# Patient Record
Sex: FEMALE | Race: WHITE | NOT HISPANIC OR LATINO | Employment: OTHER | URBAN - METROPOLITAN AREA
[De-identification: names, ages, dates, MRNs, and addresses within clinical notes are randomized per-mention and may not be internally consistent; named-entity substitution may affect disease eponyms.]

---

## 2019-04-04 ENCOUNTER — HOSPITAL ENCOUNTER (OUTPATIENT)
Dept: OTHER | Facility: HOSPITAL | Age: 59
Discharge: HOME | End: 2019-04-04

## 2019-04-04 LAB
ALBUMIN SERPL-MCNC: 4 G/DL (ref 3.5–5)
ALBUMIN/GLOB SERPL: 1.1 {RATIO} (ref 1.4–2.6)
ALP SERPL-CCNC: 112 U/L (ref 53–141)
ALT SERPL-CCNC: 17 U/L (ref 10–40)
ANION GAP SERPL CALC-SCNC: 21 MMOL/L (ref 8–19)
APPEARANCE UR: CLEAR
APTT BLD: 25.2 S (ref 22.2–34.2)
AST SERPL-CCNC: 23 U/L (ref 15–50)
BASOPHILS # BLD AUTO: 0.05 10*3/UL (ref 0–0.2)
BASOPHILS NFR BLD AUTO: 0.7 % (ref 0–3)
BILIRUB SERPL-MCNC: 0.47 MG/DL (ref 0.2–1.3)
BILIRUB UR QL: NEGATIVE
BUN SERPL-MCNC: 14 MG/DL (ref 5–25)
BUN/CREAT SERPL: 14 {RATIO} (ref 6–20)
CALCIUM SERPL-MCNC: 9.8 MG/DL (ref 8.7–10.4)
CHLORIDE SERPL-SCNC: 101 MMOL/L (ref 99–111)
COLOR UR: YELLOW
CONV ABS IMM GRAN: 0.02 10*3/UL (ref 0–0.2)
CONV CO2: 25 MMOL/L (ref 22–32)
CONV COLLECTION SOURCE (UA): NORMAL
CONV IMMATURE GRAN: 0.3 % (ref 0–1.8)
CONV TOTAL PROTEIN: 7.6 G/DL (ref 6.3–8.2)
CONV UROBILINOGEN IN URINE BY AUTOMATED TEST STRIP: 0.2 {EHRLICHU}/DL (ref 0.1–1)
CREAT UR-MCNC: 0.98 MG/DL (ref 0.5–0.9)
DEPRECATED RDW RBC AUTO: 45.9 FL (ref 36.4–46.3)
EOSINOPHIL # BLD AUTO: 0.24 10*3/UL (ref 0–0.7)
EOSINOPHIL # BLD AUTO: 3.3 % (ref 0–7)
ERYTHROCYTE [DISTWIDTH] IN BLOOD BY AUTOMATED COUNT: 14.8 % (ref 11.7–14.4)
GFR SERPLBLD BASED ON 1.73 SQ M-ARVRAT: >60 ML/MIN/{1.73_M2}
GLOBULIN UR ELPH-MCNC: 3.6 G/DL (ref 2–3.5)
GLUCOSE SERPL-MCNC: 122 MG/DL (ref 65–99)
GLUCOSE UR QL: NEGATIVE MG/DL
HBA1C MFR BLD: 13.3 G/DL (ref 12–16)
HCT VFR BLD AUTO: 43.2 % (ref 37–47)
HGB UR QL STRIP: NEGATIVE
INR PPP: 0.91 (ref 2–3)
KETONES UR QL STRIP: NEGATIVE MG/DL
LEUKOCYTE ESTERASE UR QL STRIP: NEGATIVE
LYMPHOCYTES # BLD AUTO: 1.73 10*3/UL (ref 1–5)
MCH RBC QN AUTO: 26.1 PG (ref 27–31)
MCHC RBC AUTO-ENTMCNC: 30.8 G/DL (ref 33–37)
MCV RBC AUTO: 84.9 FL (ref 81–99)
MONOCYTES # BLD AUTO: 0.49 10*3/UL (ref 0.2–1.2)
MONOCYTES NFR BLD AUTO: 6.7 % (ref 3–10)
NEUTROPHILS # BLD AUTO: 4.75 10*3/UL (ref 2–8)
NEUTROPHILS NFR BLD AUTO: 65.2 % (ref 30–85)
NITRITE UR QL STRIP: NEGATIVE
NRBC CBCN: 0 % (ref 0–0.7)
OSMOLALITY SERPL CALC.SUM OF ELEC: 298 MOSM/KG (ref 273–304)
PH UR STRIP.AUTO: 6 [PH] (ref 5–8)
PLATELET # BLD AUTO: 304 10*3/UL (ref 130–400)
PMV BLD AUTO: 9.8 FL (ref 9.4–12.3)
POTASSIUM SERPL-SCNC: 4.1 MMOL/L (ref 3.5–5.3)
PROT UR QL: NEGATIVE MG/DL
PROTHROMBIN TIME: 9.7 S (ref 9.4–12)
RBC # BLD AUTO: 5.09 10*6/UL (ref 4.2–5.4)
SODIUM SERPL-SCNC: 143 MMOL/L (ref 135–147)
SP GR UR: 1.02 (ref 1–1.03)
VARIANT LYMPHS NFR BLD MANUAL: 23.8 % (ref 20–45)
WBC # BLD AUTO: 7.28 10*3/UL (ref 4.8–10.8)

## 2021-03-15 ENCOUNTER — HOSPITAL ENCOUNTER (OUTPATIENT)
Dept: VACCINE CLINIC | Facility: HOSPITAL | Age: 61
Discharge: HOME | End: 2021-03-15
Attending: INTERNAL MEDICINE

## 2021-04-05 ENCOUNTER — HOSPITAL ENCOUNTER (OUTPATIENT)
Dept: VACCINE CLINIC | Facility: HOSPITAL | Age: 61
Discharge: HOME | End: 2021-04-05
Attending: INTERNAL MEDICINE

## 2021-06-02 ENCOUNTER — CONVERSION ENCOUNTER (OUTPATIENT)
Dept: ORTHOPEDIC SURGERY | Facility: CLINIC | Age: 61
End: 2021-06-02

## 2021-06-02 ENCOUNTER — OFFICE VISIT CONVERTED (OUTPATIENT)
Dept: ORTHOPEDIC SURGERY | Facility: CLINIC | Age: 61
End: 2021-06-02
Attending: ORTHOPAEDIC SURGERY

## 2021-06-04 ENCOUNTER — OFFICE VISIT CONVERTED (OUTPATIENT)
Dept: PODIATRY | Facility: CLINIC | Age: 61
End: 2021-06-04
Attending: PODIATRIST

## 2021-06-05 NOTE — H&P
History and Physical      Patient Name: Becca Bansal   Patient ID: [id number]   Sex: Female   YOB: 1960        Visit Date: June 4, 2021    Provider: Kurtis Ngo DPM   Location: [facility]   Location Address: [address] [place] [address]  [id number]   Location Phone: [phone]          Chief Complaint  · Bilateral Foot Pain      History Of Present Illness  Becca Bansal is a 60 year old /White female who presents to the [facility] today new patient      New, Established, New Problem:  new  Location:  Bilateral heel  Duration:  2019  Onset:  gradual  Nature:  achy, sharp, shooting  Stable, worsening, improving:  worsening  Aggravating factors:    Patient describes morning bilateral heel pain as stabbing, burning, or aching. This pain usually subsides throughout the day, however it returns afterperiods of rest andsitting, when standing back up on their feet,and again the next morning.    Previous Treatment:  none    Patient denies any fevers, chills, nausea, vomiting, shortness of breathe, nor any other constitutional signs nor symptoms.    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       Past Medical History  Arthritis; Brain aneurysm; Foot pain; Foot pain, bilateral; Heel pain; HLD (hyperlipidemia); HTN (hypertension); Reflux; Seasonal allergies         Past Surgical History  Angiogram; Colonoscopy         Medication List  Alavert 10 mg oral tablet,disintegrating; aspirin 81 mg oral tablet,delayed release (DR/EC); Daily Multiple oral tablet; ferrous sulfate 325 mg (65 mg iron) oral tablet; Flonase Allergy Relief 50 mcg/actuation nasal spray,suspension; lisinopril-hydrochlorothiazide 20-25 mg oral tablet; Omega-3 350 mg-235 mg- 90 mg-597 mg oral capsule,delayed release(DR/EC); omeprazole 20 mg oral capsule,delayed release(DR/EC); spironolactone 100 mg oral tablet; Vitamin D3 125 mcg (5,000 unit) oral tablet  "        Allergy List  NO KNOWN DRUG ALLERGIES       Allergies Reconciled  Family Medical History  Heart Disease; Cancer, Unspecified; Diabetes, unspecified type         Social History  Alcohol Use (Current some day); lives with other; Recreational Drug Use (Never); Single.; Tobacco (Never); Working         Review of Systems  · Constitutional  o Denies  o : fatigue, night sweats  · Eyes  o Denies  o : double vision, blurred vision  · HENT  o Denies  o : vertigo, recent head injury  · Cardiovascular  o Denies  o : chest pain, irregular heart beats  · Respiratory  o Denies  o : shortness of breath, productive cough  · Gastrointestinal  o Denies  o : nausea, vomiting  · Genitourinary  o Denies  o : dysuria, urinary retention  · Integument  o Denies  o : hair growth change, new skin lesions  · Neurologic  o Denies  o : altered mental status, seizures  · Musculoskeletal  o * See HPI  · Endocrine  o Denies  o : cold intolerance, heat intolerance  · Heme-Lymph  o Denies  o : petechiae, lymph node enlargement or tenderness  · Allergic-Immunologic  o Denies  o : frequent illnesses      Vitals  Date Time BP Position Site L\R Cuff Size HR RR TEMP (F) WT  HT  BMI kg/m2 BSA m2 O2 Sat FR L/min FiO2 HC       06/04/2021 09:53 /69 Sitting    90 - R  96.7 233lbs 16oz 5'  3\" 41.45 2.17 99 %            Physical Examination  · Constitutional  o Appearance  o : overweight, well developed, appears to be chronically ill   · Cardiovascular  o Peripheral Vascular System  o :   § Pedal Pulses  § : Pedal Pulses are +2 and symmetrical   § Extremities  § : No edema of the lower extremities  · Skin and Subcutaneous Tissue  o General Inspection  o : Skin is noted to have normal texture and turgor, with no excresences noted.  o Digits and Nails  o : The tonails are noted to be without disease.  · Neurologic  o Sensation  o : Epicritic sensations intact bilaterally.  · Right Ankle/Foot  o Inspection  o : Positive tenderness to palpaiton to the " medial tubercle of the calcaneus. No signs of edema, erythema, lymphangitis, nor signs of infection.   · Left Ankle/Foot  o Inspection  o : Positive tenderness to palpaiton to the medial tubercle of the calcaneus. No signs of edema, erythema, lymphangitis, nor signs of infection.      IN-OFFICE IMAGING:  3 view, AP, MO, Lateral, b/l foot.  No periosteal reactions nor osteolytic changes seen.  No occult fractures seen.  Anterior Cyma Line break seen.           Assessment  · Foot pain, bilateral       Pain in right foot     729.5/M79.671  Pain in left foot     729.5/M79.672  · Plantar fascial fibromatosis of both feet     728.71/M72.2      Plan  · Orders  o Foot (Left) 3 or more views X-Ray [facility] Preferred View ([id number]) - - 06/04/2021  o Foot (Right) 3 or more views X-Ray [facility] Preferred View ([id number]) - - 06/04/2021  · Medications  o diclofenac sodium 75 mg oral tablet,delayed release (DR/EC)   SIG: take 1 tablet (75 mg) by oral route 2 times per day for 30 days   DISP: (60) Tablet with 1 refills  Prescribed on 06/04/2021     o Medrol (Charles) 4 mg oral tablets,dose pack   SIG: take as directed for 6 days   DISP: (1) Package with 0 refills  Prescribed on 06/04/2021     o Medications have been Reconciled  o Transition of Care or Provider Policy  · Instructions  o Handouts provided regarding Planter Fascitis.  o Overview: This patient has a plantar fasciitis.  o Discuss Findings: I have discussed the findings of this evaluation with the patient. The discussion included a complete verbal explanation of any changes in the examination results, diagnosis, and the current treatment plan. A schedule for future care needs was explained. If any questions should arise after returning home, I have encouraged the patient to feel free to contact Dr. Ngo. The patient states understanding and agreement with this plan.  o Monitor For Problems: Pt to monitor for problems and to contact Dr. Ngo for follow-up should such signs  occur. Patient states understanding and agreement with this plan.  o Spenco: Recommend OTC Spenco Orthotics.  o Treatement Alternatives: Nonsurgical treatments almost always improve the pain. Treatment can last from several months to 2 years before symptoms get better. Most patients feel better in 9 months. Rarely Some people need surgery to relieve the pain.  o Conservative Treatment Recommendations: Anti-inflammatory medication to reduce pain and inflammation, Heel stretching exercises,   o Follow Up in 3 weeks.  o Discussed proper shoegear for the patient's feet and medical condition.  o Rice Therapy: It is important to treat any injury as soon as possible to help control swelling and increase recovery time. The recognized regimen for immediate treatment of sport injuries includes rest, ice (cold application), compression, and elevation (RICE). Remove the injured athlete from play, apply ice to the affected area, wrap or compress the injured area with an elastic bandage when appropriate, and elevate the injured area above heart level to reduce swelling. The patient is to not use ice for longer than 20 minutes at a time, with at least 20 minutes of no ice usage between applications.   o Electronically Identified Patient Education Materials Provided Electronically  · Disposition  o Call or Return if symptoms worsen or persist.            Electronically Signed by: Kurtis Ngo DPM -Author on June 4, 2021 10:36:56 AM

## 2021-06-05 NOTE — H&P
History and Physical      Patient Name: Becca Bansal   Patient ID: [id number]   Sex: Female   YOB: 1960        Visit Date: June 2, 2021    Provider: Serafin Gil MD   Location: [facility]   Location Address: [address] [place] [address]  [id number]   Location Phone: [phone]          Chief Complaint  · Right Knee Pain  · Right Hand Pain      History Of Present Illness  Becca Bansal is a 60 year old /White female who presents today to [facility].      Patient presents today for an evaluation of right knee and right hand pain. She has been having right knee pain for several years with no known injury or trauma. She has tried PT that has provided her with relief. She started having knee pain along the anterior and medial aspect of her right knee. She states that knee pain is tolerable at this time.     Patient states right hand pain has begun a couple of months ago. She denies any numbness and tingling in her right hand. She localizes pain around the knuckles. She states her knuckles are very stiff in the morning.       Past Medical History  Arthritis; Reflux; Seasonal allergies         Past Surgical History  Colonoscopy         Allergy List  NO KNOWN DRUG ALLERGIES       Allergies Reconciled  Family Medical History  Heart Disease; Cancer, Unspecified; Diabetes, unspecified type         Social History  Alcohol Use (Current some day); lives with other; Recreational Drug Use (Never); Single.; Tobacco (Never); Working         Review of Systems  · Constitutional  o Denies  o : fever, chills, weight loss  · Cardiovascular  o Denies  o : chest pain, shortness of breath  · Gastrointestinal  o Denies  o : liver disease, heartburn, nausea, blood in stools  · Genitourinary  o Denies  o : painful urination, blood in urine  · Integument  o Denies  o : rash, itching  · Neurologic  o Denies  o : headache, weakness, loss of consciousness  · Musculoskeletal  o Denies  o :  "painful, swollen joints  · Psychiatric  o Denies  o : drug/alcohol addiction, anxiety, depression      Vitals  Date Time BP Position Site L\R Cuff Size HR RR TEMP (F) WT  HT  BMI kg/m2 BSA m2 O2 Sat FR L/min FiO2 HC       06/02/2021 03:24 PM         232lbs 16oz 5'  3\" 41.27 2.17             Physical Examination  · Constitutional  o Appearance  o : well developed, well-nourished, no obvious deformities present  · Head and Face  o Head  o :   § Inspection  § : normocephalic  o Face  o :   § Inspection  § : no facial lesions  · Eyes  o Conjunctivae  o : conjunctivae normal  o Sclerae  o : sclerae white  · Ears, Nose, Mouth and Throat  o Ears  o :   § External Ears  § : appearance within normal limits  § Hearing  § : intact  o Nose  o :   § External Nose  § : appearance normal  · Neck  o Inspection/Palpation  o : normal appearance  o Range of Motion  o : full range of motion  · Respiratory  o Respiratory Effort  o : breathing unlabored  o Inspection of Chest  o : normal appearance  o Auscultation of Lungs  o : no audible wheezing or rales  · Cardiovascular  o Heart  o : regular rate  · Gastrointestinal  o Abdominal Examination  o : soft and non-tender  · Skin and Subcutaneous Tissue  o General Inspection  o : intact, no rashes  · Psychiatric  o General  o : Alert and oriented x3  o Judgement and Insight  o : judgment and insight intact  o Mood and Affect  o : mood normal, affect appropriate  · Right Hand  o Inspection  o : Neurovascular intact. Sensation grossly intact. No swelling, atrophy, and skin discoloration. Skin intact. Full ROM. Patient able to wiggle fingers and make a fist. Full wrist extension, full wrist flexion, full , full thumb opposition, full PIP flexors, full DIP flexors, full PIP extensors, full finger adduction, full finger abduction. Radial pulse 2+, ulnar pulse 2+. Tenderness about the knuckles. Negative Phalen's. Negative Tinel's. Negative Finkelstein's.   · Right Knee  o Inspection  o : " Tender medial joint line. Non-tender lateral joint line. Negative Lachman. Negative posterior sag. Stable to valgus/varus stress. Good strength in quadriceps, hamstrings, dorsiflexors, and plantar flexors. No swelling, skin discoloration or atrophy. Sensation grossly intact. Neurovascular intact. Skin intact. Full flexion and extension. Hamstring tenderness. Hamstring tightness.   · In Office Procedures  o View  o : AP/LATERAL  o Site  o : right, hand   o Indication  o : Right Hand Pain  o Study  o : X-rays ordered, taken in the office, and reviewed today.  o Xray  o : Mild degenerative changes of the right knee, no fracture or dislocation. Very mild degenerative changes of the right hand. No fracture or dislocation is noted.           Assessment  · Primary osteoarthritis of right knee     715.16/M17.11  · Arthralgia of right hand     719.44/M25.541  · Right knee pain, unspecified chronicity     719.46/M25.561  · Right Hand Osteoarthritis     715.90/M19.90      Plan  · Orders  o Hand (Right) [facility] Preferred View ([id number]) - 719.44/M25.541 - 06/02/2021  · Medications  o Medications have been Reconciled  o Transition of Care or Provider Policy  · Instructions  o Dr. Gil saw and examined the patient and agrees with plan.   o X-rays reviewed by Dr. Gil.  o Reviewed the patient's Past Medical, Social, and Family history as well as the ROS at today's visit, no changes.  o Call or return if worsening symptoms.  o Follow Up PRN.  o Discussed treatment plans and diagnosis with the patient. Patient doesn't have a lot of knee pain and hand pain today. She denies an injection at this time. She opted to try an anti-inflammatory. She was prescribed an anti-inflammatory today.   o The above service was scribed by Marylou Crenshaw on my behalf and I attest to the accuracy of the note. [email or web address]            Electronically Signed by: Marylou Crenshaw-, Other -Author on Amaris 3, 2021 10:50:34 AM  Electronically Co-signed by:  Serafin Gil MD -Reviewer on June 4, 2021 05:26:29 PM

## 2021-07-15 VITALS
SYSTOLIC BLOOD PRESSURE: 151 MMHG | TEMPERATURE: 96.7 F | WEIGHT: 234 LBS | OXYGEN SATURATION: 99 % | HEIGHT: 63 IN | BODY MASS INDEX: 41.46 KG/M2 | HEART RATE: 90 BPM | DIASTOLIC BLOOD PRESSURE: 69 MMHG

## 2021-07-15 VITALS — HEIGHT: 63 IN | WEIGHT: 233 LBS | BODY MASS INDEX: 41.29 KG/M2

## 2022-03-11 ENCOUNTER — TRANSCRIBE ORDERS (OUTPATIENT)
Dept: ADMINISTRATIVE | Facility: HOSPITAL | Age: 62
End: 2022-03-11

## 2022-03-11 DIAGNOSIS — N28.9 RENAL INSUFFICIENCY: Primary | ICD-10-CM

## 2022-03-18 ENCOUNTER — APPOINTMENT (OUTPATIENT)
Dept: ULTRASOUND IMAGING | Facility: HOSPITAL | Age: 62
End: 2022-03-18

## 2022-03-21 ENCOUNTER — APPOINTMENT (OUTPATIENT)
Dept: ULTRASOUND IMAGING | Facility: HOSPITAL | Age: 62
End: 2022-03-21

## 2022-03-28 ENCOUNTER — HOSPITAL ENCOUNTER (OUTPATIENT)
Dept: ULTRASOUND IMAGING | Facility: HOSPITAL | Age: 62
Discharge: HOME | End: 2022-03-28
Admitting: INTERNAL MEDICINE

## 2022-03-28 DIAGNOSIS — N28.9 RENAL INSUFFICIENCY: ICD-10-CM

## 2022-03-28 PROCEDURE — 76775 US EXAM ABDO BACK WALL LIM: CPT

## 2022-06-20 ENCOUNTER — TRANSCRIBE ORDERS (OUTPATIENT)
Dept: ADMINISTRATIVE | Facility: HOSPITAL | Age: 62
End: 2022-06-20

## 2022-06-20 DIAGNOSIS — R13.10 DYSPHAGIA, UNSPECIFIED TYPE: Primary | ICD-10-CM

## 2022-06-24 ENCOUNTER — HOSPITAL ENCOUNTER (OUTPATIENT)
Dept: GENERAL RADIOLOGY | Facility: HOSPITAL | Age: 62
Discharge: HOME | End: 2022-06-24
Admitting: NURSE PRACTITIONER

## 2022-06-24 DIAGNOSIS — R13.10 DYSPHAGIA, UNSPECIFIED TYPE: ICD-10-CM

## 2022-06-24 PROCEDURE — 74221 X-RAY XM ESOPHAGUS 2CNTRST: CPT

## 2022-06-24 RX ADMIN — BARIUM SULFATE 700 MG: 700 TABLET ORAL at 09:52

## 2022-06-24 RX ADMIN — ANTACID/ANTIFLATULENT 1 PACKET: 380; 550; 10; 10 GRANULE, EFFERVESCENT ORAL at 09:53

## 2022-06-24 RX ADMIN — BARIUM SULFATE 135 ML: 980 POWDER, FOR SUSPENSION ORAL at 09:52

## 2022-06-24 RX ADMIN — BARIUM SULFATE 355 ML: 0.6 SUSPENSION ORAL at 09:53

## 2022-07-07 ENCOUNTER — LAB (OUTPATIENT)
Dept: LAB | Facility: HOSPITAL | Age: 62
End: 2022-07-07

## 2022-07-07 ENCOUNTER — TRANSCRIBE ORDERS (OUTPATIENT)
Dept: LAB | Facility: HOSPITAL | Age: 62
End: 2022-07-07

## 2022-07-07 DIAGNOSIS — N28.9 RENAL INSUFFICIENCY: ICD-10-CM

## 2022-07-07 DIAGNOSIS — N28.9 RENAL INSUFFICIENCY: Primary | ICD-10-CM

## 2022-07-07 LAB
ANION GAP SERPL CALCULATED.3IONS-SCNC: 9.6 MMOL/L (ref 5–15)
BACTERIA UR QL AUTO: ABNORMAL /HPF
BASOPHILS # BLD AUTO: 0.05 10*3/MM3 (ref 0–0.2)
BASOPHILS NFR BLD AUTO: 0.6 % (ref 0–1.5)
BILIRUB UR QL STRIP: NEGATIVE
BUN SERPL-MCNC: 28 MG/DL (ref 8–23)
BUN/CREAT SERPL: 24.8 (ref 7–25)
CALCIUM SPEC-SCNC: 9.7 MG/DL (ref 8.6–10.5)
CHLORIDE SERPL-SCNC: 103 MMOL/L (ref 98–107)
CLARITY UR: CLEAR
CO2 SERPL-SCNC: 25.4 MMOL/L (ref 22–29)
COLOR UR: YELLOW
CREAT SERPL-MCNC: 1.13 MG/DL (ref 0.57–1)
CREAT UR-MCNC: 210.6 MG/DL
DEPRECATED RDW RBC AUTO: 48.6 FL (ref 37–54)
EGFRCR SERPLBLD CKD-EPI 2021: 54.8 ML/MIN/1.73
EOSINOPHIL # BLD AUTO: 0.19 10*3/MM3 (ref 0–0.4)
EOSINOPHIL NFR BLD AUTO: 2.4 % (ref 0.3–6.2)
ERYTHROCYTE [DISTWIDTH] IN BLOOD BY AUTOMATED COUNT: 15.7 % (ref 12.3–15.4)
GLUCOSE SERPL-MCNC: 128 MG/DL (ref 65–99)
GLUCOSE UR STRIP-MCNC: ABNORMAL MG/DL
HCT VFR BLD AUTO: 41.5 % (ref 34–46.6)
HGB BLD-MCNC: 13 G/DL (ref 12–15.9)
HGB UR QL STRIP.AUTO: NEGATIVE
HYALINE CASTS UR QL AUTO: ABNORMAL /LPF
IMM GRANULOCYTES # BLD AUTO: 0.01 10*3/MM3 (ref 0–0.05)
IMM GRANULOCYTES NFR BLD AUTO: 0.1 % (ref 0–0.5)
KETONES UR QL STRIP: ABNORMAL
LEUKOCYTE ESTERASE UR QL STRIP.AUTO: ABNORMAL
LYMPHOCYTES # BLD AUTO: 1.73 10*3/MM3 (ref 0.7–3.1)
LYMPHOCYTES NFR BLD AUTO: 21.7 % (ref 19.6–45.3)
MCH RBC QN AUTO: 26.7 PG (ref 26.6–33)
MCHC RBC AUTO-ENTMCNC: 31.3 G/DL (ref 31.5–35.7)
MCV RBC AUTO: 85.4 FL (ref 79–97)
MONOCYTES # BLD AUTO: 0.57 10*3/MM3 (ref 0.1–0.9)
MONOCYTES NFR BLD AUTO: 7.2 % (ref 5–12)
NEUTROPHILS NFR BLD AUTO: 5.41 10*3/MM3 (ref 1.7–7)
NEUTROPHILS NFR BLD AUTO: 68 % (ref 42.7–76)
NITRITE UR QL STRIP: NEGATIVE
NRBC BLD AUTO-RTO: 0 /100 WBC (ref 0–0.2)
PH UR STRIP.AUTO: 5.5 [PH] (ref 5–8)
PHOSPHATE SERPL-MCNC: 3.6 MG/DL (ref 2.5–4.5)
PLATELET # BLD AUTO: 276 10*3/MM3 (ref 140–450)
PMV BLD AUTO: 9.6 FL (ref 6–12)
POTASSIUM SERPL-SCNC: 4.5 MMOL/L (ref 3.5–5.2)
PROT ?TM UR-MCNC: 21.6 MG/DL
PROT UR QL STRIP: NEGATIVE
PROT/CREAT UR: 0.1 MG/G{CREAT}
RBC # BLD AUTO: 4.86 10*6/MM3 (ref 3.77–5.28)
RBC # UR STRIP: ABNORMAL /HPF
REF LAB TEST METHOD: ABNORMAL
SODIUM SERPL-SCNC: 138 MMOL/L (ref 136–145)
SP GR UR STRIP: 1.03 (ref 1–1.03)
SQUAMOUS #/AREA URNS HPF: ABNORMAL /HPF
UROBILINOGEN UR QL STRIP: ABNORMAL
WBC # UR STRIP: ABNORMAL /HPF
WBC NRBC COR # BLD: 7.96 10*3/MM3 (ref 3.4–10.8)

## 2022-07-07 PROCEDURE — 36415 COLL VENOUS BLD VENIPUNCTURE: CPT

## 2022-07-07 PROCEDURE — 82570 ASSAY OF URINE CREATININE: CPT

## 2022-07-07 PROCEDURE — 80048 BASIC METABOLIC PNL TOTAL CA: CPT

## 2022-07-07 PROCEDURE — 81001 URINALYSIS AUTO W/SCOPE: CPT

## 2022-07-07 PROCEDURE — 85025 COMPLETE CBC W/AUTO DIFF WBC: CPT

## 2022-07-07 PROCEDURE — 84100 ASSAY OF PHOSPHORUS: CPT

## 2022-07-07 PROCEDURE — 84156 ASSAY OF PROTEIN URINE: CPT

## 2022-07-18 ENCOUNTER — HOSPITAL ENCOUNTER (OUTPATIENT)
Dept: CARDIOLOGY | Facility: HOSPITAL | Age: 62
Discharge: HOME | End: 2022-07-18
Admitting: NURSE PRACTITIONER

## 2022-07-18 ENCOUNTER — TRANSCRIBE ORDERS (OUTPATIENT)
Dept: ADMINISTRATIVE | Facility: HOSPITAL | Age: 62
End: 2022-07-18

## 2022-07-18 DIAGNOSIS — R07.9 CHEST PAIN, UNSPECIFIED TYPE: Primary | ICD-10-CM

## 2022-07-18 DIAGNOSIS — R07.9 CHEST PAIN, UNSPECIFIED TYPE: ICD-10-CM

## 2022-07-18 PROCEDURE — 93005 ELECTROCARDIOGRAM TRACING: CPT | Performed by: NURSE PRACTITIONER

## 2022-07-18 PROCEDURE — 93010 ELECTROCARDIOGRAM REPORT: CPT | Performed by: INTERNAL MEDICINE

## 2022-07-19 ENCOUNTER — TRANSCRIBE ORDERS (OUTPATIENT)
Dept: GENERAL RADIOLOGY | Facility: HOSPITAL | Age: 62
End: 2022-07-19

## 2022-08-03 ENCOUNTER — TELEPHONE (OUTPATIENT)
Dept: GASTROENTEROLOGY | Facility: CLINIC | Age: 62
End: 2022-08-03

## 2022-08-03 NOTE — TELEPHONE ENCOUNTER
Patient contacted office on 08/02/2022 about second referral from primary care provider. I changed phone screening call to direct access.  Due to the diagnosis for the second referral an office visit needed to be made. Scheduled patient an office visit and left all details on voicemail.

## 2022-08-07 LAB — QT INTERVAL: 362 MS

## 2022-08-23 ENCOUNTER — TRANSCRIBE ORDERS (OUTPATIENT)
Dept: ADMINISTRATIVE | Facility: HOSPITAL | Age: 62
End: 2022-08-23

## 2022-08-23 DIAGNOSIS — R13.13 CRICOPHARYNGEAL DYSPHAGIA: Primary | ICD-10-CM

## 2022-09-20 ENCOUNTER — OFFICE VISIT (OUTPATIENT)
Dept: GASTROENTEROLOGY | Facility: CLINIC | Age: 62
End: 2022-09-20

## 2022-09-20 VITALS
SYSTOLIC BLOOD PRESSURE: 105 MMHG | WEIGHT: 205.9 LBS | HEIGHT: 63 IN | HEART RATE: 83 BPM | DIASTOLIC BLOOD PRESSURE: 71 MMHG | BODY MASS INDEX: 36.48 KG/M2

## 2022-09-20 DIAGNOSIS — Z12.11 COLON CANCER SCREENING: ICD-10-CM

## 2022-09-20 DIAGNOSIS — K21.9 GASTROESOPHAGEAL REFLUX DISEASE, UNSPECIFIED WHETHER ESOPHAGITIS PRESENT: ICD-10-CM

## 2022-09-20 DIAGNOSIS — R13.13 PHARYNGEAL DYSPHAGIA: Primary | ICD-10-CM

## 2022-09-20 PROCEDURE — 99204 OFFICE O/P NEW MOD 45 MIN: CPT | Performed by: NURSE PRACTITIONER

## 2022-09-20 RX ORDER — FAMOTIDINE 20 MG/1
TABLET, FILM COATED ORAL
COMMUNITY
Start: 2022-09-07 | End: 2023-01-27

## 2022-09-20 RX ORDER — LEVOCETIRIZINE DIHYDROCHLORIDE 5 MG/1
TABLET, FILM COATED ORAL
COMMUNITY
Start: 2022-09-07 | End: 2023-01-27

## 2022-09-20 RX ORDER — SOD SULF/POT CHLORIDE/MAG SULF 1.479 G
12 TABLET ORAL TAKE AS DIRECTED
Qty: 24 TABLET | Refills: 0 | Status: ON HOLD | OUTPATIENT
Start: 2022-09-20 | End: 2023-02-01

## 2022-09-20 RX ORDER — PREDNISONE 20 MG/1
TABLET ORAL
COMMUNITY
End: 2023-01-27

## 2022-09-20 RX ORDER — OMEPRAZOLE 20 MG/1
20 CAPSULE, DELAYED RELEASE ORAL
COMMUNITY

## 2022-09-20 RX ORDER — PROCHLORPERAZINE 25 MG/1
SUPPOSITORY RECTAL
COMMUNITY
Start: 2022-08-01

## 2022-09-20 RX ORDER — AMLODIPINE BESYLATE 5 MG/1
5 TABLET ORAL DAILY
COMMUNITY

## 2022-09-20 RX ORDER — LORATADINE 10 MG
10 TABLET,DISINTEGRATING ORAL DAILY
COMMUNITY

## 2022-09-20 RX ORDER — AZELASTINE 1 MG/ML
1 SPRAY, METERED NASAL AS NEEDED
COMMUNITY
Start: 2022-09-07

## 2022-09-20 RX ORDER — DAPAGLIFLOZIN 10 MG/1
10 TABLET, FILM COATED ORAL
COMMUNITY

## 2022-09-20 RX ORDER — LISINOPRIL AND HYDROCHLOROTHIAZIDE 25; 20 MG/1; MG/1
1 TABLET ORAL DAILY
COMMUNITY

## 2022-09-20 RX ORDER — FERROUS SULFATE 325(65) MG
325 TABLET ORAL
COMMUNITY
End: 2023-01-27

## 2022-09-20 RX ORDER — FLUTICASONE PROPIONATE 50 MCG
1 SPRAY, SUSPENSION (ML) NASAL AS NEEDED
COMMUNITY

## 2022-09-20 RX ORDER — PROCHLORPERAZINE 25 MG/1
SUPPOSITORY RECTAL
COMMUNITY
Start: 2022-08-09

## 2022-09-20 RX ORDER — BENZONATATE 200 MG/1
200 CAPSULE ORAL AS NEEDED
COMMUNITY

## 2022-09-20 RX ORDER — BUDESONIDE 1 MG/2ML
INHALANT ORAL
COMMUNITY
Start: 2022-07-18 | End: 2023-01-27

## 2022-09-20 RX ORDER — CYCLOBENZAPRINE HCL 10 MG
10 TABLET ORAL AS NEEDED
COMMUNITY

## 2022-09-20 RX ORDER — ASPIRIN 81 MG/1
81 TABLET, CHEWABLE ORAL DAILY
COMMUNITY

## 2022-09-20 RX ORDER — ASCORBIC ACID 125 MG
1 TABLET,CHEWABLE ORAL DAILY
COMMUNITY

## 2022-09-20 RX ORDER — SPIRONOLACTONE 100 MG/1
TABLET, FILM COATED ORAL
COMMUNITY
End: 2023-01-27

## 2022-09-20 RX ORDER — ATORVASTATIN CALCIUM 10 MG/1
1 TABLET, FILM COATED ORAL 3 TIMES WEEKLY
COMMUNITY

## 2022-09-20 RX ORDER — CHLORAL HYDRATE 500 MG
1 CAPSULE ORAL
COMMUNITY
End: 2023-01-27

## 2022-09-20 RX ORDER — MONTELUKAST SODIUM 10 MG/1
10 TABLET ORAL NIGHTLY
COMMUNITY

## 2022-09-20 NOTE — PROGRESS NOTES
"Patient Name: Becca Bansal   Visit Date: 09/20/2022   Patient ID: [id number]  Provider: MARINO Bernal    Sex: female  Location:  Location Address:  Location Phone: [address] [place] [address] [phone]    YOB: 1960      Primary Care Provider Nat Paez APRN      Referring Provider: Nat Martinez*        Chief Complaint  Difficulty Swallowing and Colon Cancer Screening    History of Present Illness  Becca Bansal is a 61 y.o. who presents to [facility] on referral from Nat Martinez* for a gastroenterology evaluation of Difficulty Swallowing and Colon Cancer Screening.    Ms. Bansal reports difficulty swallowing on and off for several years now, mainly with liquids. \"Sometimes I choke on my own salvia\". Notices drinking through a straw helps to prevent choking. Location is pharyngeal. Admits to GERD that is controlled with omeprazole once daily. Denies any nausea, vomiting, change in appetite, or weight loss. Taking 81mg aspirin daily due to hx of brain aneurysm. Consumes 1 cup of coffee and an occasional glass of tea daily.     Also due for colon cancer screening. Bowel movement at least once a day. Describes stool to be a #4 on the bristol stool chart. Denies any hematochezia or melena.      Labs Result Review Imaging    Past Medical History:   Diagnosis Date   • Acid reflux    • Arthritis    • Bilateral foot pain 06/04/2021   • Brain aneurysm 2020   • Chest pain    • Chronic kidney disease    • Heel pain    • HLD (hyperlipidemia)    • HTN (hypertension)    • Hyperlipemia    • Seasonal allergies    • SOB (shortness of breath)        Past Surgical History:   Procedure Laterality Date   • CARDIAC CATHETERIZATION     • COLONOSCOPY  2011   • UPPER GASTROINTESTINAL ENDOSCOPY  2011         Current Outpatient Medications:   •  amLODIPine (NORVASC) 5 MG tablet, amlodipine 5 mg tablet  TAKE ONE " TABLET BY MOUTH DAILY, Disp: , Rfl:   •  aspirin 81 MG chewable tablet, aspirin 81 mg chewable tablet  CHEW AND SWALLOW ONE TABLET EVERY DAY, Disp: , Rfl:   •  atorvastatin (LIPITOR) 10 MG tablet, Take 1 tablet by mouth 2 (Two) Times a Week., Disp: , Rfl:   •  benzonatate (TESSALON) 200 MG capsule, benzonatate 200 mg capsule  TAKE ONE CAPSULE BY MOUTH THREE TIMES DAILY AS DIRECTED FOR 10 DAYS, Disp: , Rfl:   •  Cannabidiol 100 MG/ML solution, Take 1 mg by mouth., Disp: , Rfl:   •  Cholecalciferol (Vitamin D3) 1.25 MG (24939 UT) tablet, , Disp: , Rfl:   •  Continuous Blood Gluc Sensor (Dexcom G6 Sensor), USE AS directed TO check blood sugar, Disp: , Rfl:   •  Continuous Blood Gluc Transmit (Dexcom G6 Transmitter) misc, USE AS directed TO check blood sugar, Disp: , Rfl:   •  dapagliflozin Propanediol (Farxiga) 10 MG tablet, Farxiga 10 mg tablet  TAKE ONE TABLET BY MOUTH DAILY, Disp: , Rfl:   •  ferrous sulfate 325 (65 FE) MG tablet, ferrous sulfate 325 mg (65 mg iron) oral tablet take 1 tablet (325 mg) by oral route once daily   Active, Disp: , Rfl:   •  fluticasone (FLONASE) 50 MCG/ACT nasal spray, fluticasone propionate 50 mcg/actuation nasal spray,suspension  instill 1 SPRAY IN EACH notril EVERY DAY AS DIRECTED, Disp: , Rfl:   •  lisinopril-hydrochlorothiazide (PRINZIDE,ZESTORETIC) 20-25 MG per tablet, lisinopril 20 mg-hydrochlorothiazide 25 mg tablet  TAKE ONE TABLET BY MOUTH DAILY, Disp: , Rfl:   •  loratadine (Alavert) 10 MG disintegrating tablet, Alavert 10 mg oral tablet,disintegrating take 1 tablet (10 mg) and place on top of the tongue where it will dissolve, then swallow by oral route once daily   Active, Disp: , Rfl:   •  Multiple Vitamins-Minerals (Multi Adult Gummies) chewable tablet, , Disp: , Rfl:   •  Omega-3 Fatty Acids (fish oil) 1000 MG capsule capsule, Take 1 capsule by mouth., Disp: , Rfl:   •  omeprazole (priLOSEC) 20 MG capsule, omeprazole 20 mg capsule,delayed release  TAKE ONE CAPSULE BY  "MOUTH ONCE DAILY BEFORE A MEAL, Disp: , Rfl:   •  azelastine (ASTELIN) 0.1 % nasal spray, instill 1 TO 2 SPRAYS IN EACH nostril TWICE DAILY AS NEEDED, Disp: , Rfl:   •  budesonide (PULMICORT) 1 MG/2ML nebulizer solution, EMPTY THE CONTENTS OF 1 VIAL IN IRRIGATION DEVICE, ADD DILUENT, SHAKE, AND IRRIGATE EVERY MORNING, Disp: , Rfl:   •  cyclobenzaprine (FLEXERIL) 10 MG tablet, cyclobenzaprine 10 mg tablet  TAKE ONE TABLET BY MOUTH THREE TIMES DAILY, Disp: , Rfl:   •  famotidine (PEPCID) 20 MG tablet, TAKE ONE TABLET BY MOUTH 2 hours BEFORE cluster therapy, Disp: , Rfl:   •  levocetirizine (XYZAL) 5 MG tablet, TAKE ONE TABLET BY MOUTH 2 hours BEFORE cluster therapy, Disp: , Rfl:   •  montelukast (SINGULAIR) 10 MG tablet, Take 10 mg by mouth., Disp: , Rfl:   •  predniSONE (DELTASONE) 20 MG tablet, prednisone 20 mg tablet  TAKE ONE TABLET BY MOUTH DAILY, Disp: , Rfl:   •  Sodium Sulfate-Mag Sulfate-KCl (Sutab) 1752-441-887 MG tablet, Take 12 tablets by mouth Take As Directed. Take 12 tablets at 6 pm and 12 tablets 4 hours prior to procedure., Disp: 24 tablet, Rfl: 0  •  spironolactone (ALDACTONE) 100 MG tablet, spironolactone 100 mg oral tablet take 0.5 tablet by oral route daily   Active, Disp: , Rfl:      No Known Allergies    Family History   Problem Relation Age of Onset   • Heart disease Father    • Diabetes Father    • Cancer Brother    • Diabetes Brother    • Irritable bowel syndrome Mother         Social History     Social History Narrative   • Not on file         Objective     Review of Systems   Gastrointestinal: Positive for GERD.        Vital Signs:   /71 (BP Location: Left arm, Patient Position: Sitting, Cuff Size: Large Adult)   Pulse 83   Ht 160 cm (63\")   Wt 93.4 kg (205 lb 14.4 oz)   BMI 36.47 kg/m²       Physical Exam  Constitutional:       General: She is not in acute distress.     Appearance: Normal appearance. She is well-developed. She is obese. She is not ill-appearing.   HENT:      " Head: Normocephalic and atraumatic.   Eyes:      Conjunctiva/sclera: Conjunctivae normal.      Pupils: Pupils are equal, round, and reactive to light.      Visual Fields: Right eye visual fields normal and left eye visual fields normal.   Cardiovascular:      Rate and Rhythm: Normal rate and regular rhythm.      Heart sounds: Normal heart sounds.   Pulmonary:      Effort: Pulmonary effort is normal. No retractions.      Breath sounds: Normal breath sounds and air entry.   Abdominal:      General: Bowel sounds are normal. There is no distension.      Palpations: Abdomen is soft.      Tenderness: There is no abdominal tenderness.      Comments: No appreciable hepatosplenomegaly or ascites   Musculoskeletal:         General: Normal range of motion.      Cervical back: Normal range of motion and neck supple.   Skin:     General: Skin is warm and dry.   Neurological:      Mental Status: She is alert and oriented to person, place, and time.   Psychiatric:         Mood and Affect: Mood and affect normal.         Behavior: Behavior normal.         Result Review :   The following data was reviewed by: MARINO Bernal on 09/20/2022:  CBC w/diff    CBC w/Diff 7/7/22   WBC 7.96   RBC 4.86   Hemoglobin 13.0   Hematocrit 41.5   MCV 85.4   MCH 26.7   MCHC 31.3 (A)   RDW 15.7 (A)   Platelets 276   Neutrophil Rel % 68.0   Immature Granulocyte Rel % 0.1   Lymphocyte Rel % 21.7   Monocyte Rel % 7.2   Eosinophil Rel % 2.4   Basophil Rel % 0.6   (A) Abnormal value            CMP    CMP 7/7/22   Glucose 128 (A)   BUN 28 (A)   Creatinine 1.13 (A)   Sodium 138   Potassium 4.5   Chloride 103   Calcium 9.7   (A) Abnormal value            Protime   Date Value Ref Range Status   04/23/2019 12.4 10.3 - 13.3 s Final     Comment:     (note)  Anticoagulants may alter the results of Laboratory   Coagulation assays. New-generation anticoagulants such as   direct Thrombin inhibitors (Dabigatran/Pradaxa, Argatroban,   Bivalrudin) and  direct/indirect factor Xa inhibitors   (Rivaroxaban/Xarelto,Apixaban/Eliquis, Danaparoid/Orgaran,   Fondaparinux/Arixtra) have been shown to affect the results   of Laboratory Coagulation assays, creating falsely elevated   or decreased values. Correlation with medication history is   advised.     INR   Date Value Ref Range Status   04/23/2019 1.1 INR Final     Comment:     INR Therapeutic Ranges:  Low Intensity Range: 2.0-3.0  High Intensity Range:  3.0-4.5             Assessment and Plan {CC Problem List  Visit Diagnosis  ROS  Review (Popup)  Health Maintenance  Quality  BestPractice  Medications  SmartSets  SnapShot Encounters  Media :23}   Diagnoses and all orders for this visit:    1. Pharyngeal dysphagia (Primary)  -     Case Request; Standing  -     Case Request    2. Gastroesophageal reflux disease, unspecified whether esophagitis present  -     Case Request; Standing  -     Case Request    3. Colon cancer screening  -     Case Request; Standing  -     Case Request    Other orders  -     Follow Anesthesia Guidelines / Protocol; Future  -     Obtain Informed Consent; Future  -     Sodium Sulfate-Mag Sulfate-KCl (Sutab) 5463-667-446 MG tablet; Take 12 tablets by mouth Take As Directed. Take 12 tablets at 6 pm and 12 tablets 4 hours prior to procedure.  Dispense: 24 tablet; Refill: 0      ESOPHAGOGASTRODUODENOSCOPY (N/A), COLONOSCOPY (N/A)       Follow Up   Return for Follow up after procedure.  Patient was given instructions and counseling regarding her condition or for health maintenance advice. Please see specific information pulled into the AVS if appropriate.

## 2022-09-22 ENCOUNTER — PATIENT ROUNDING (BHMG ONLY) (OUTPATIENT)
Dept: GASTROENTEROLOGY | Facility: CLINIC | Age: 62
End: 2022-09-22

## 2022-09-22 NOTE — PROGRESS NOTES
9/22/2022      Hello, may I speak with Becca Bansal     My name is Carmelita, I am the Clinical Coordinator. I am calling from [facility].    Before we get started may I verify your date of birth? 1960    I am calling to officially welcome you to our practice and ask about your recent visit. Is this a good time to talk? No left voicemail.     Tell me about your visit with us. What things went well?         We're always looking for ways to make our patients' experiences even better. Do you have recommendations on ways we may improve?    Overall were you satisfied with your first visit to our practice?    I appreciate you taking the time to speak with me today. Is there anything else I can do for you?    I'm glad to hear that you had a very good visit. We would really appreciate you completing a survey if you receive one either in the mail, email or text.       Thank you, and have a great day.

## 2022-10-07 ENCOUNTER — HOSPITAL ENCOUNTER (OUTPATIENT)
Dept: GENERAL RADIOLOGY | Facility: HOSPITAL | Age: 62
Discharge: HOME | End: 2022-10-07
Admitting: OTOLARYNGOLOGY

## 2022-10-07 DIAGNOSIS — R13.13 CRICOPHARYNGEAL DYSPHAGIA: ICD-10-CM

## 2022-10-07 PROCEDURE — 92611 MOTION FLUOROSCOPY/SWALLOW: CPT

## 2022-10-07 PROCEDURE — 74230 X-RAY XM SWLNG FUNCJ C+: CPT

## 2022-10-07 RX ADMIN — BARIUM SULFATE 1 TEASPOON(S): 0.6 CREAM ORAL at 10:32

## 2022-10-07 RX ADMIN — BARIUM SULFATE 50 ML: 400 SUSPENSION ORAL at 10:32

## 2022-10-07 RX ADMIN — BARIUM SULFATE 55 ML: 0.81 POWDER, FOR SUSPENSION ORAL at 10:32

## 2022-10-07 NOTE — MBS/VFSS/FEES
Outpatient - Speech Language Pathology   Swallow Modified Barium Swallow Study  Jigar     Patient Name: Becca Bansal  : 1960  MRN: [id number]  Today's Date: 10/7/2022               Admit Date: 10/7/2022    Visit Dx:     ICD-10-CM ICD-9-CM   1. Cricopharyngeal dysphagia  R13.13 787.23     Patient Active Problem List   Diagnosis   • Pharyngeal dysphagia   • Gastroesophageal reflux disease   • Colon cancer screening     Past Medical History:   Diagnosis Date   • Acid reflux    • Arthritis    • Bilateral foot pain 2021   • Brain aneurysm    • Chest pain    • Chronic kidney disease    • Heel pain    • HLD (hyperlipidemia)    • HTN (hypertension)    • Hyperlipemia    • Seasonal allergies    • SOB (shortness of breath)      Past Surgical History:   Procedure Laterality Date   • CARDIAC CATHETERIZATION     • COLONOSCOPY     • UPPER GASTROINTESTINAL ENDOSCOPY         MODIFIED BARIUM SWALLOW STUDY: SPEECH PATHOLOGY REPORT        DATE OF SERVICE: 10/7/2022    PERTINENT INFORMATION:  Ms. Bansal is a 62year old female with diagnosis of dysphagia.  Patient states concern of getting choked with liquids.  Report of aspiration on recent esophagram.    She was referred for an MBSS by Dr. Baker to rule out aspiration as well as to determine appropriate treatment plan for this patient.      PROCEDURE:    Ms. Bansal was alert and cooperative.  The patient was viewed in the lateral plane.  The following Ba consistencies were administered: Thin liquids, nectar thick liquids, barium mixed with applesauce, barium paste, barium mixed with cracker. The following compensatory swallowing strategies were performed: Bolus modification, cyclic ingestion.    RESULTS:    1.  Nectar liquid by spoon with swallow completed.  2. Nectar liquid by cup with swallow completed.  3. Thin liquid with spillage to the vallecula, swallow completed.   4. Purée with spillage to the vallecula, swallow completed.  5. Pudding with  spillage to the vallecula, swallow completed.  6. Solid results with chewing followed by swallow completed.  7. Thin liquid via straw with spillage to the vallecula, swallow completed.  Sequential swallow with spillage to the pharynx, swallow completed.      IMPRESSIONS:    Ms. Bansal demonstrated oral pharyngeal swallow which is grossly within functional limits.  Minimal swallow delay is noted.  No laryngeal penetration, no aspiration were observed during this study.      FUNCTIONAL DEFICIT: Patient scored level 7 of 7 on Functional Communication Measures for swallowing indicating a 0% limitation in function for current status, goal status, and discharge status.      RECOMMENDATIONS:   1.  Diet of regular solids, thin liquid.  2.  Positioning fully upright for all p.o. intake and 30 minutes following.  3.  Alternate small bites and small sips of solids and liquids at a slow rate.  Maintain attention to task with drinking.        Yes, patient/responsible party agrees with the plan of care and has been informed of all alternatives, risks and benefits.    Thank you for this referral.                                                                                                 Time Calculation:    Time Calculation- SLP     Row Name 10/07/22 1128             Time Calculation- SLP    SLP Received On 10/07/22  -TB              Untimed Charges    SLP Eval/Re-eval  ST Motion Fluoro Eval Swallow - 69975  -TB      01019-TD Motion Fluoro Eval Swallow Minutes 90  -TB              Total Minutes    Untimed Charges Total Minutes 90  -TB       Total Minutes 90  -TB            User Key  (r) = Recorded By, (t) = Taken By, (c) = Cosigned By    Initials Name Provider Type    TB Virginia Sanchez SLP Speech and Language Pathologist                Therapy Charges for Today     Code Description Service Date Service Provider Modifiers Qty    [id number]  ST MOTION FLUORO EVAL SWALLOW 6 10/7/2022 Virginia Sanchez SLP GN 1               Virginia  Daniel, SLP  10/7/2022

## 2023-01-18 ENCOUNTER — TELEPHONE (OUTPATIENT)
Dept: GASTROENTEROLOGY | Facility: CLINIC | Age: 63
End: 2023-01-18
Payer: COMMERCIAL

## 2023-02-01 ENCOUNTER — ANESTHESIA (OUTPATIENT)
Dept: GASTROENTEROLOGY | Facility: HOSPITAL | Age: 63
End: 2023-02-01
Payer: COMMERCIAL

## 2023-02-01 ENCOUNTER — ANESTHESIA EVENT (OUTPATIENT)
Dept: GASTROENTEROLOGY | Facility: HOSPITAL | Age: 63
End: 2023-02-01
Payer: COMMERCIAL

## 2023-02-01 ENCOUNTER — TELEPHONE (OUTPATIENT)
Dept: GASTROENTEROLOGY | Facility: CLINIC | Age: 63
End: 2023-02-01
Payer: COMMERCIAL

## 2023-02-01 ENCOUNTER — HOSPITAL ENCOUNTER (OUTPATIENT)
Facility: HOSPITAL | Age: 63
Setting detail: HOSPITAL OUTPATIENT SURGERY
Discharge: HOME | End: 2023-02-01
Attending: INTERNAL MEDICINE | Admitting: INTERNAL MEDICINE
Payer: COMMERCIAL

## 2023-02-01 VITALS
HEIGHT: 63 IN | HEART RATE: 73 BPM | RESPIRATION RATE: 16 BRPM | WEIGHT: 209.44 LBS | BODY MASS INDEX: 37.11 KG/M2 | DIASTOLIC BLOOD PRESSURE: 82 MMHG | OXYGEN SATURATION: 98 % | TEMPERATURE: 97.4 F | SYSTOLIC BLOOD PRESSURE: 116 MMHG

## 2023-02-01 DIAGNOSIS — Z12.11 COLON CANCER SCREENING: ICD-10-CM

## 2023-02-01 DIAGNOSIS — R13.13 PHARYNGEAL DYSPHAGIA: ICD-10-CM

## 2023-02-01 DIAGNOSIS — K21.9 GASTROESOPHAGEAL REFLUX DISEASE, UNSPECIFIED WHETHER ESOPHAGITIS PRESENT: ICD-10-CM

## 2023-02-01 LAB — GLUCOSE BLDC GLUCOMTR-MCNC: 120 MG/DL (ref 70–99)

## 2023-02-01 PROCEDURE — 88305 TISSUE EXAM BY PATHOLOGIST: CPT | Performed by: INTERNAL MEDICINE

## 2023-02-01 PROCEDURE — 43249 ESOPH EGD DILATION <30 MM: CPT | Performed by: INTERNAL MEDICINE

## 2023-02-01 PROCEDURE — C1726 CATH, BAL DIL, NON-VASCULAR: HCPCS | Performed by: INTERNAL MEDICINE

## 2023-02-01 PROCEDURE — 25010000002 PROPOFOL 10 MG/ML EMULSION: Performed by: NURSE ANESTHETIST, CERTIFIED REGISTERED

## 2023-02-01 PROCEDURE — 45378 DIAGNOSTIC COLONOSCOPY: CPT | Performed by: INTERNAL MEDICINE

## 2023-02-01 PROCEDURE — 82962 GLUCOSE BLOOD TEST: CPT

## 2023-02-01 PROCEDURE — 43239 EGD BIOPSY SINGLE/MULTIPLE: CPT | Performed by: INTERNAL MEDICINE

## 2023-02-01 RX ORDER — PROPOFOL 10 MG/ML
VIAL (ML) INTRAVENOUS AS NEEDED
Status: DISCONTINUED | OUTPATIENT
Start: 2023-02-01 | End: 2023-02-01 | Stop reason: SURG

## 2023-02-01 RX ORDER — LIDOCAINE HYDROCHLORIDE 20 MG/ML
INJECTION, SOLUTION EPIDURAL; INFILTRATION; INTRACAUDAL; PERINEURAL AS NEEDED
Status: DISCONTINUED | OUTPATIENT
Start: 2023-02-01 | End: 2023-02-01 | Stop reason: SURG

## 2023-02-01 RX ORDER — SODIUM CHLORIDE, SODIUM LACTATE, POTASSIUM CHLORIDE, CALCIUM CHLORIDE 600; 310; 30; 20 MG/100ML; MG/100ML; MG/100ML; MG/100ML
30 INJECTION, SOLUTION INTRAVENOUS CONTINUOUS
Status: DISCONTINUED | OUTPATIENT
Start: 2023-02-01 | End: 2023-02-01 | Stop reason: HOSPADM

## 2023-02-01 RX ADMIN — LIDOCAINE HYDROCHLORIDE 100 MG: 20 INJECTION, SOLUTION EPIDURAL; INFILTRATION; INTRACAUDAL; PERINEURAL at 12:19

## 2023-02-01 RX ADMIN — SODIUM CHLORIDE, POTASSIUM CHLORIDE, SODIUM LACTATE AND CALCIUM CHLORIDE 30 ML/HR: 600; 310; 30; 20 INJECTION, SOLUTION INTRAVENOUS at 12:04

## 2023-02-01 RX ADMIN — PROPOFOL 200 MCG/KG/MIN: 10 INJECTION, EMULSION INTRAVENOUS at 12:19

## 2023-02-01 RX ADMIN — PROPOFOL 100 MG: 10 INJECTION, EMULSION INTRAVENOUS at 12:19

## 2023-02-01 NOTE — ANESTHESIA PREPROCEDURE EVALUATION
Anesthesia Evaluation     Patient summary reviewed and Nursing notes reviewed   no history of anesthetic complications:  NPO Solid Status: > 8 hours  NPO Liquid Status: > 2 hours           Airway   Mallampati: II  TM distance: >3 FB  Neck ROM: full  No difficulty expected  Dental      Pulmonary - negative pulmonary ROS and normal exam    breath sounds clear to auscultation  Cardiovascular - normal exam  Exercise tolerance: good (4-7 METS)    Rhythm: regular  Rate: normal    (+) hypertension,       Neuro/Psych- negative ROS  GI/Hepatic/Renal/Endo    (+)  GERD,  diabetes mellitus,     Musculoskeletal (-) negative ROS    Abdominal    Substance History - negative use     OB/GYN negative ob/gyn ROS         Other - negative ROS       ROS/Med Hx Other: PAT Nursing Notes unavailable.                   Anesthesia Plan    ASA 3     general       Anesthetic plan, risks, benefits, and alternatives have been provided, discussed and informed consent has been obtained with: patient.        CODE STATUS:

## 2023-02-01 NOTE — H&P
Pre Procedure History & Physical    Chief Complaint:   Dysphagia, screening colonoscopy    Subjective     HPI:   61 yo F here for eval of dysphagia, screening colonoscopy.    Past Medical History:   Past Medical History:   Diagnosis Date   • Acid reflux    • Arthritis    • Bilateral foot pain 06/04/2021   • Brain aneurysm 2020   • Chest pain    • Chronic kidney disease    • Diabetes mellitus (HCC)    • Elevated cholesterol    • Heel pain    • HLD (hyperlipidemia)    • HTN (hypertension)    • Hyperlipemia    • Seasonal allergies    • SOB (shortness of breath)        Past Surgical History:  Past Surgical History:   Procedure Laterality Date   • COLONOSCOPY  2011   • UPPER GASTROINTESTINAL ENDOSCOPY  2011       Family History:  Family History   Problem Relation Age of Onset   • Irritable bowel syndrome Mother    • Heart disease Father    • Diabetes Father    • Cancer Brother    • Diabetes Brother    • Malig Hyperthermia Neg Hx        Social History:   reports that she has never smoked. She does not have any smokeless tobacco history on file. She reports that she does not currently use alcohol. She reports that she does not use drugs.    Medications:   Medications Prior to Admission   Medication Sig Dispense Refill Last Dose   • amLODIPine (NORVASC) 5 MG tablet Take 5 mg by mouth Daily.      • aspirin 81 MG chewable tablet Chew 81 mg Daily.      • atorvastatin (LIPITOR) 10 MG tablet Take 1 tablet by mouth 3 (Three) Times a Week. NIGHT      • azelastine (ASTELIN) 0.1 % nasal spray 1 spray As Needed.      • benzonatate (TESSALON) 200 MG capsule Take 200 mg by mouth As Needed.      • Cannabidiol 100 MG/ML solution Take 1 mg by mouth Daily.      • Cholecalciferol (Vitamin D3) 1.25 MG (00989 UT) tablet Take 50,000 Units by mouth 2 (Two) Times a Week. NIGHT ON Sunday AND THURSDAY      • Continuous Blood Gluc Sensor (Dexcom G6 Sensor) USE AS directed TO check blood sugar      • Continuous Blood Gluc Transmit (Dexcom G6  "Transmitter) misc USE AS directed TO check blood sugar      • cyclobenzaprine (FLEXERIL) 10 MG tablet Take 10 mg by mouth As Needed.      • dapagliflozin Propanediol (Farxiga) 10 MG tablet 10 mg.      • fluticasone (FLONASE) 50 MCG/ACT nasal spray 1 spray into the nostril(s) as directed by provider As Needed.      • lisinopril-hydrochlorothiazide (PRINZIDE,ZESTORETIC) 20-25 MG per tablet Take 1 tablet by mouth Daily.      • loratadine (Alavert) 10 MG disintegrating tablet Place 10 mg on the tongue Daily.      • montelukast (SINGULAIR) 10 MG tablet Take 10 mg by mouth Every Night.      • Multiple Vitamins-Minerals (Multi Adult Gummies) chewable tablet Chew 1 tablet Daily.      • omeprazole (priLOSEC) 20 MG capsule 20 mg every night at bedtime.      • Sodium Sulfate-Mag Sulfate-KCl (Sutab) 0687-858-705 MG tablet Take 12 tablets by mouth Take As Directed. Take 12 tablets at 6 pm and 12 tablets 4 hours prior to procedure. 24 tablet 0        Allergies:  Patient has no known allergies.    ROS:    Pertinent items are noted in HPI     Objective     Height 160 cm (62.99\"), weight 95 kg (209 lb 7 oz).    Physical Exam   Constitutional: Pt is oriented to person, place, and time and well-developed, well-nourished, and in no distress.   Mouth/Throat: Oropharynx is clear and moist.   Neck: Normal range of motion.   Cardiovascular: Normal rate, regular rhythm and normal heart sounds.    Pulmonary/Chest: Effort normal and breath sounds normal.   Abdominal: Soft. Nontender  Skin: Skin is warm and dry.   Psychiatric: Mood, memory, affect and judgment normal.     Assessment & Plan     Diagnosis:  Dysphagia, screening colonoscopy    Anticipated Surgical Procedure:  EGD/colonoscopy    The risks, benefits, and alternatives of this procedure have been discussed with the patient or the responsible party- the patient understands and agrees to proceed.          "

## 2023-02-01 NOTE — ANESTHESIA POSTPROCEDURE EVALUATION
Patient: Becca Bansal    Procedure Summary     Date: 02/01/23 Room / Location: [facility] END[facility]COPY 2 / [facility]    Anesthesia Start: 1217 Anesthesia Stop: 1300    Procedures:       ESOPHAGOGASTRODUODENOSCOPY WITH BIOPSIES, ESOPHAGEAL BALLOON DILATATION 15-18MM AND 18-20MM      COLONOSCOPY Diagnosis:       Pharyngeal dysphagia      Gastroesophageal reflux disease, unspecified whether esophagitis present      Colon cancer screening      (Pharyngeal dysphagia [R13.13])      (Gastroesophageal reflux disease, unspecified whether esophagitis present [K21.9])      (Colon cancer screening [Z12.11])    Surgeons: Alice Guerra MD Provider: Lucas Link MD    Anesthesia Type: general ASA Status: 3          Anesthesia Type: general    Vitals  Vitals Value Taken Time   /82 02/01/23 1317   Temp 36.3 °C (97.4 °F) 02/01/23 1315   Pulse 76 02/01/23 1318   Resp 16 02/01/23 1315   SpO2 99 % 02/01/23 1318   Vitals shown include unvalidated device data.        Post Anesthesia Care and Evaluation    Patient location during evaluation: bedside  Patient participation: complete - patient participated  Level of consciousness: awake  Pain management: adequate    Airway patency: patent  Anesthetic complications: No anesthetic complications  PONV Status: none  Cardiovascular status: acceptable and stable  Respiratory status: acceptable  Hydration status: acceptable    Comments: An Anesthesiologist personally participated in the most demanding procedures (including induction and emergence if applicable) in the anesthesia plan, monitored the course of anesthesia administration at frequent intervals and remained physically present and available for immediate diagnosis and treatment of emergencies.

## 2023-02-02 ENCOUNTER — TELEPHONE (OUTPATIENT)
Dept: GASTROENTEROLOGY | Facility: CLINIC | Age: 63
End: 2023-02-02
Payer: COMMERCIAL

## 2023-02-02 LAB
CYTO UR: NORMAL
LAB AP CASE REPORT: NORMAL
LAB AP CLINICAL INFORMATION: NORMAL
PATH REPORT.FINAL DX SPEC: NORMAL
PATH REPORT.GROSS SPEC: NORMAL

## 2023-02-02 NOTE — TELEPHONE ENCOUNTER
----- Message from Alice Guerra MD sent at 2/2/2023  2:26 PM EST -----  Recall colonoscopy in 10 years for screening purposes.    Mild inflammation noted on esophagus bx's.  Normal gastric biopsies.    Please arrange f/u appointment.

## 2023-02-02 NOTE — TELEPHONE ENCOUNTER
Spoke to pt and informed of Dr. Guerra's result note and recommendations. Confirmed pt taking Omeprazole as listed in med rec. Pt verified understanding. Pt does not report any GI concerns or issues. F/u apt scheduled on 8/9/2023.

## 2023-08-09 ENCOUNTER — OFFICE VISIT (OUTPATIENT)
Dept: GASTROENTEROLOGY | Facility: CLINIC | Age: 63
End: 2023-08-09
Payer: COMMERCIAL

## 2023-08-09 VITALS
HEART RATE: 101 BPM | HEIGHT: 63 IN | WEIGHT: 213 LBS | BODY MASS INDEX: 37.74 KG/M2 | DIASTOLIC BLOOD PRESSURE: 76 MMHG | SYSTOLIC BLOOD PRESSURE: 139 MMHG

## 2023-08-09 DIAGNOSIS — R13.13 PHARYNGEAL DYSPHAGIA: Primary | ICD-10-CM

## 2023-08-09 PROCEDURE — 99213 OFFICE O/P EST LOW 20 MIN: CPT | Performed by: NURSE PRACTITIONER

## 2023-08-09 NOTE — PROGRESS NOTES
Chief Complaint     Difficulty Swallowing and Heartburn    History of Present Illness     Becca Bansal is a 62 y.o. female who presents to [facility] for follow-up of dysphagia and screening for colorectal cancer.    Patient was previously evaluated by Bre Sapp for symptoms of dysphagia.  Previous records reviewed.    She reports initial improvement in dysphagia following EGD.  Noticed about two weeks ago, then she began having issue with medications again.  Only notices this with larger pills and feels that they become lodged in upper esophagus.  She is able to get medications to pass with sips of liquid.      Heartburn is controlled with daily omeprazole per PCP.         History      Past Medical History:   Diagnosis Date    Acid reflux     Arthritis     Bilateral foot pain 06/04/2021    Brain aneurysm 2020    Chest pain     Chronic kidney disease     Diabetes mellitus     Elevated cholesterol     Heel pain     HLD (hyperlipidemia)     HTN (hypertension)     Hyperlipemia     Seasonal allergies     SOB (shortness of breath)      Past Surgical History:   Procedure Laterality Date    COLONOSCOPY  2011    COLONOSCOPY N/A 2/1/2023    Procedure: COLONOSCOPY;  Surgeon: Alice Guerra MD;  Location: [facility] ENDOSCOPY;  Service: Gastroenterology;  Laterality: N/A;  HEMORRHOIDS    ENDOSCOPY N/A 2/1/2023    Procedure: ESOPHAGOGASTRODUODENOSCOPY WITH BIOPSIES, ESOPHAGEAL BALLOON DILATATION 15-18MM AND 18-20MM;  Surgeon: Alice Guerra MD;  Location: [facility] ENDOSCOPY;  Service: Gastroenterology;  Laterality: N/A;  GASTRIC POLYPS, SCHATZKI'S RING, GASTRITIS, HIATAL HERNIA    UPPER GASTROINTESTINAL ENDOSCOPY  2011     Family History   Problem Relation Age of Onset    Irritable bowel syndrome Mother     Heart disease Father     Diabetes Father     Cancer Brother     Diabetes Brother     Malig Hyperthermia Neg Hx         Current Medications       Current Outpatient  "Medications:     amLODIPine (NORVASC) 5 MG tablet, Take 1 tablet by mouth Daily., Disp: , Rfl:     aspirin 81 MG chewable tablet, Chew 1 tablet Daily., Disp: , Rfl:     atorvastatin (LIPITOR) 10 MG tablet, Take 1 tablet by mouth 3 (Three) Times a Week. NIGHT, Disp: , Rfl:     azelastine (ASTELIN) 0.1 % nasal spray, 1 spray As Needed., Disp: , Rfl:     benzonatate (TESSALON) 200 MG capsule, Take 1 capsule by mouth As Needed., Disp: , Rfl:     Cannabidiol 100 MG/ML solution, Take 1 mg by mouth Daily., Disp: , Rfl:     Cholecalciferol (Vitamin D3) 1.25 MG (83121 UT) tablet, Take 1 tablet by mouth 2 (Two) Times a Week. NIGHT ON Sunday AND THURSDAY, Disp: , Rfl:     dapagliflozin Propanediol (Farxiga) 10 MG tablet, 10 mg., Disp: , Rfl:     fluticasone (FLONASE) 50 MCG/ACT nasal spray, 1 spray into the nostril(s) as directed by provider As Needed., Disp: , Rfl:     lisinopril-hydrochlorothiazide (PRINZIDE,ZESTORETIC) 20-25 MG per tablet, Take 1 tablet by mouth Daily., Disp: , Rfl:     montelukast (SINGULAIR) 10 MG tablet, Take 1 tablet by mouth Every Night., Disp: , Rfl:     Multiple Vitamins-Minerals (Multi Adult Gummies) chewable tablet, Chew 1 tablet Daily., Disp: , Rfl:     omeprazole (priLOSEC) 20 MG capsule, 1 capsule every night at bedtime., Disp: , Rfl:      Allergies     No Known Allergies    Social History       Social History     Social History Narrative    Not on file         Objective       /76 (BP Location: Left arm, Patient Position: Sitting, Cuff Size: Adult)   Pulse 101   Ht 160 cm (63\")   Wt 96.6 kg (213 lb)   BMI 37.73 kg/mý       Physical Exam    Results       Result Review :    The following data was reviewed by: MARINO Farias on 08/09/2023:    CBC w/diff          7/10/2023    11:22   CBC w/Diff   WBC 6.17    RBC 4.71    Hemoglobin 12.9    Hematocrit 39.5    MCV 83.9    MCH 27.4    MCHC 32.7    RDW 14.9    Platelets 267    Neutrophil Rel % 66.8    Immature Granulocyte Rel % 0.3  "   Lymphocyte Rel % 23.2    Monocyte Rel % 6.3    Eosinophil Rel % 2.8    Basophil Rel % 0.6      CMP          7/10/2023    11:22   CMP   Glucose 118    BUN 19    Creatinine 1.28    EGFR 46.9    Sodium 140    Potassium 4.4    Chloride 102    Calcium 10.2    BUN/Creatinine Ratio 14.8    Anion Gap 10.5      2/1/2023 EGD-mild Schatzki's ring at the GE junction.  GE junction biopsy with mild chronic inflammation.  Dilated to 20 mm.  Small hiatal hernia.  Mild inflammation in the gastric antrum, Biopsy-negative for H. pylori and intestinal metaplasia.  Multiple 5 to 8 mm fundic gland polyps in the stomach.  Colonoscopy-Hemorrhoids found on perianal exam.  Colonoscopy otherwise normal.  Recommend repeat colonoscopy in 10 years.               Assessment and Plan              Diagnoses and all orders for this visit:    1. Pharyngeal dysphagia (Primary)      Discussed peppermint oil and warm liquid prior to medication administration.  If no improvement, patient will call office.          Follow Up     Follow Up   Return if symptoms worsen or fail to improve.  Patient was given instructions and counseling regarding her condition or for health maintenance advice. Please see specific information pulled into the AVS if appropriate.

## 2023-08-10 ENCOUNTER — TRANSCRIBE ORDERS (OUTPATIENT)
Dept: ADMINISTRATIVE | Facility: HOSPITAL | Age: 63
End: 2023-08-10
Payer: COMMERCIAL

## 2023-08-10 DIAGNOSIS — R92.8 ABNORMAL MAMMOGRAM: Primary | ICD-10-CM

## 2023-08-14 ENCOUNTER — HOSPITAL ENCOUNTER (OUTPATIENT)
Dept: ULTRASOUND IMAGING | Facility: HOSPITAL | Age: 63
Discharge: HOME | End: 2023-08-14
Payer: COMMERCIAL

## 2023-08-14 ENCOUNTER — HOSPITAL ENCOUNTER (OUTPATIENT)
Dept: MAMMOGRAPHY | Facility: HOSPITAL | Age: 63
Discharge: HOME | End: 2023-08-14
Payer: COMMERCIAL

## 2023-08-14 DIAGNOSIS — R92.8 ABNORMAL MAMMOGRAM: ICD-10-CM

## 2023-08-14 PROCEDURE — 76642 ULTRASOUND BREAST LIMITED: CPT

## 2023-08-14 PROCEDURE — G0279 TOMOSYNTHESIS, MAMMO: HCPCS

## 2023-08-14 PROCEDURE — 77065 DX MAMMO INCL CAD UNI: CPT

## 2023-08-21 ENCOUNTER — HOSPITAL ENCOUNTER (OUTPATIENT)
Dept: MAMMOGRAPHY | Facility: HOSPITAL | Age: 63
Discharge: HOME | End: 2023-08-21
Payer: COMMERCIAL

## 2023-08-21 ENCOUNTER — PATIENT OUTREACH (OUTPATIENT)
Dept: ONCOLOGY | Facility: HOSPITAL | Age: 63
End: 2023-08-21
Payer: COMMERCIAL

## 2023-08-21 DIAGNOSIS — R92.1 BREAST CALCIFICATIONS: ICD-10-CM

## 2023-08-21 PROCEDURE — 88305 TISSUE EXAM BY PATHOLOGIST: CPT | Performed by: NURSE PRACTITIONER

## 2023-08-21 PROCEDURE — 76098 X-RAY EXAM SURGICAL SPECIMEN: CPT

## 2023-08-21 PROCEDURE — A4648 IMPLANTABLE TISSUE MARKER: HCPCS

## 2023-08-21 PROCEDURE — C1819 TISSUE LOCALIZATION-EXCISION: HCPCS

## 2023-08-21 PROCEDURE — 0 LIDOCAINE 1 % SOLUTION: Performed by: NURSE PRACTITIONER

## 2023-08-21 RX ORDER — LIDOCAINE HYDROCHLORIDE AND EPINEPHRINE 10; 10 MG/ML; UG/ML
10 INJECTION, SOLUTION INFILTRATION; PERINEURAL ONCE
Status: COMPLETED | OUTPATIENT
Start: 2023-08-21 | End: 2023-08-21

## 2023-08-21 RX ORDER — LIDOCAINE HYDROCHLORIDE 10 MG/ML
10 INJECTION, SOLUTION INFILTRATION; PERINEURAL ONCE
Status: COMPLETED | OUTPATIENT
Start: 2023-08-21 | End: 2023-08-21

## 2023-08-21 RX ADMIN — LIDOCAINE HYDROCHLORIDE 10 ML: 10 INJECTION, SOLUTION INFILTRATION; PERINEURAL at 10:40

## 2023-08-21 RX ADMIN — LIDOCAINE HYDROCHLORIDE,EPINEPHRINE BITARTRATE 10 ML: 10; .01 INJECTION, SOLUTION INFILTRATION; PERINEURAL at 10:40

## 2023-08-23 ENCOUNTER — PATIENT OUTREACH (OUTPATIENT)
Dept: ONCOLOGY | Facility: HOSPITAL | Age: 63
End: 2023-08-23
Payer: COMMERCIAL

## 2023-08-28 ENCOUNTER — PATIENT OUTREACH (OUTPATIENT)
Dept: ONCOLOGY | Facility: HOSPITAL | Age: 63
End: 2023-08-28
Payer: COMMERCIAL

## 2023-08-30 ENCOUNTER — PATIENT OUTREACH (OUTPATIENT)
Dept: ONCOLOGY | Facility: HOSPITAL | Age: 63
End: 2023-08-30
Payer: COMMERCIAL

## 2023-09-18 ENCOUNTER — PATIENT OUTREACH (OUTPATIENT)
Dept: ONCOLOGY | Facility: HOSPITAL | Age: 63
End: 2023-09-18
Payer: COMMERCIAL

## 2023-09-21 LAB
CYTO UR: NORMAL
LAB AP CASE REPORT: NORMAL
LAB AP CLINICAL INFORMATION: NORMAL
PATH REPORT.ADDENDUM SPEC: NORMAL
PATH REPORT.FINAL DX SPEC: NORMAL
PATH REPORT.GROSS SPEC: NORMAL

## 2023-09-25 ENCOUNTER — HOSPITAL ENCOUNTER (OUTPATIENT)
Dept: GENERAL RADIOLOGY | Facility: HOSPITAL | Age: 63
Discharge: HOME | End: 2023-09-25
Admitting: NURSE PRACTITIONER
Payer: COMMERCIAL

## 2023-09-25 ENCOUNTER — TRANSCRIBE ORDERS (OUTPATIENT)
Dept: GENERAL RADIOLOGY | Facility: HOSPITAL | Age: 63
End: 2023-09-25
Payer: COMMERCIAL

## 2023-09-25 DIAGNOSIS — M54.40 LOW BACK PAIN WITH SCIATICA, SCIATICA LATERALITY UNSPECIFIED, UNSPECIFIED BACK PAIN LATERALITY, UNSPECIFIED CHRONICITY: Primary | ICD-10-CM

## 2023-09-25 DIAGNOSIS — M54.40 LOW BACK PAIN WITH SCIATICA, SCIATICA LATERALITY UNSPECIFIED, UNSPECIFIED BACK PAIN LATERALITY, UNSPECIFIED CHRONICITY: ICD-10-CM

## 2023-09-25 PROCEDURE — 72100 X-RAY EXAM L-S SPINE 2/3 VWS: CPT

## 2023-09-26 ENCOUNTER — PATIENT OUTREACH (OUTPATIENT)
Dept: ONCOLOGY | Facility: HOSPITAL | Age: 63
End: 2023-09-26
Payer: COMMERCIAL

## 2023-09-27 ENCOUNTER — TELEPHONE (OUTPATIENT)
Dept: SURGERY | Facility: CLINIC | Age: 63
End: 2023-09-27
Payer: COMMERCIAL

## 2023-09-27 NOTE — TELEPHONE ENCOUNTER
New Patient appointment with Dr. Andino:   10/18/23 -1:00 . Patient is aware to arrive 15 minutes prior with paperwork filled out    Patient also aware if a breast MRI is ordered prior to consult, results will be given at time of consult.     Patient expressed v/u of appointment date and time.     New patient packet mailed.

## 2023-09-28 ENCOUNTER — TELEPHONE (OUTPATIENT)
Dept: SURGERY | Facility: CLINIC | Age: 63
End: 2023-09-28
Payer: COMMERCIAL

## 2023-09-28 NOTE — TELEPHONE ENCOUNTER
Called patient to review insurance. Current plan  10/1/23 and Dr. Andino has ordered a breast MRI however I cannot get an authorization until I have a valid account number    No answer -  left

## 2023-10-07 ENCOUNTER — HOSPITAL ENCOUNTER (OUTPATIENT)
Facility: HOSPITAL | Age: 63
Discharge: HOME | End: 2023-10-07
Admitting: SURGERY
Payer: MEDICARE

## 2023-10-07 DIAGNOSIS — N60.91 ATYPICAL DUCTAL HYPERPLASIA OF RIGHT BREAST: ICD-10-CM

## 2023-10-07 PROCEDURE — 0 GADOBENATE DIMEGLUMINE 529 MG/ML SOLUTION: Performed by: SURGERY

## 2023-10-07 PROCEDURE — A9577 INJ MULTIHANCE: HCPCS | Performed by: SURGERY

## 2023-10-07 PROCEDURE — C8908 MRI W/O FOL W/CONT, BREAST,: HCPCS

## 2023-10-07 PROCEDURE — C8937 CAD BREAST MRI: HCPCS

## 2023-10-07 RX ADMIN — GADOBENATE DIMEGLUMINE 20 ML: 529 INJECTION, SOLUTION INTRAVENOUS at 16:26

## 2023-10-12 ENCOUNTER — TRANSCRIBE ORDERS (OUTPATIENT)
Dept: SURGERY | Facility: CLINIC | Age: 63
End: 2023-10-12

## 2023-10-12 ENCOUNTER — PREP FOR SURGERY (OUTPATIENT)
Dept: OTHER | Facility: HOSPITAL | Age: 63
End: 2023-10-12
Payer: MEDICARE

## 2023-10-12 ENCOUNTER — HOSPITAL ENCOUNTER (OUTPATIENT)
Dept: SURGERY | Facility: HOSPITAL | Age: 63
Discharge: HOME | End: 2023-10-12
Payer: MEDICARE

## 2023-10-12 ENCOUNTER — OFFICE VISIT (OUTPATIENT)
Dept: SURGERY | Facility: CLINIC | Age: 63
End: 2023-10-12
Payer: MEDICARE

## 2023-10-12 VITALS
HEIGHT: 63 IN | WEIGHT: 218 LBS | RESPIRATION RATE: 17 BRPM | DIASTOLIC BLOOD PRESSURE: 78 MMHG | BODY MASS INDEX: 38.62 KG/M2 | OXYGEN SATURATION: 98 % | SYSTOLIC BLOOD PRESSURE: 136 MMHG | HEART RATE: 97 BPM

## 2023-10-12 DIAGNOSIS — R92.8 ABNORMAL FINDING ON BREAST IMAGING: Primary | ICD-10-CM

## 2023-10-12 DIAGNOSIS — Z91.89 INCREASED RISK OF BREAST CANCER: ICD-10-CM

## 2023-10-12 DIAGNOSIS — R92.8 ABNORMAL MRI, BREAST: Primary | ICD-10-CM

## 2023-10-12 DIAGNOSIS — R92.8 ABNORMAL FINDING ON BREAST IMAGING: ICD-10-CM

## 2023-10-12 DIAGNOSIS — N60.91 ATYPICAL DUCTAL HYPERPLASIA OF RIGHT BREAST: Primary | ICD-10-CM

## 2023-10-12 RX ORDER — LORATADINE 10 MG/1
1 TABLET ORAL DAILY
COMMUNITY

## 2023-10-12 NOTE — LETTER
2023       No Recipients    Patient: Becca Bansal   YOB: 1960   Date of Visit: 10/12/2023     Dear MARINO Wilson:       Thank you for referring Becca Bansal to me for evaluation. Below are the relevant portions of my assessment and plan of care.    If you have questions, please do not hesitate to call me. I look forward to following Becca along with you.         Sincerely,        Audelia Andino MD        CC:   No Recipients    Audelia Andino MD  10/12/23 1153  Sign when Signing Visit  [facility]     Referring Provider: MARINO Deng     Chief complaint: Right breast atypical ductal hyperplasia     Subjective  HPI: Ms. Becca Bansal is a 64 yo woman, seen at the request of MARINO Deng, for a new diagnosis of right breast atypical ductal hyperplasia.  She had not had a screening mammogram for 10 years, then in July of this year presented for a screening mammogram.  She was called back for multiple right breast findings.  Diagnostic imaging demonstrated 2 separate groups of calcifications and a right breast focal asymmetry without an ultrasound correlate.  She underwent biopsy of one of the groups of calcifications and this showed ADH.  The other group of calcifications and a focal asymmetry were placed in imaging surveillance.  Prior to this appointment, she underwent a breast MRI which showed an area of linear enhancement in the left breast. Her work-up is detailed in the breast history section below. Prior to the biopsy, she denies any breast lumps, pain, skin changes, or nipple discharge. She denies any prior history of abnormal mammograms or breast biopsies. She denies any family history of breast or ovarian cancer.       I personally reviewed her records and summarized her relevant breast history/imagin2013, Screening MMG ([facility]):  The current examination reveals no new  abnormalities or suspicious changes in appearance.  BI-RADS 2    07/10/2023, Screening MMG with Reggie ([facility]):   Right breast:  Focal asymmetry in the upper outer right breast middle 3rd depth with associated   grouped calcifications.  More anteriorly in the upper outer right breast is a smaller group of   calcifications.  Left breast:  Development of several course benign type left breast calcifications.  There are   developing vascular and secretory type calcifications visualized.  No architectural distortion or   suspicious mass.  BI-RADS 0: Incomplete    08/14/2023, Right Diagnostic MMG with Reggie & Right Breast US ([facility]):  MMG:  Right diagnostic mammogram:      There are subtle punctate and fine pleomorphic calcifications within the outer aspect of the right   breast which appears new from the prior examination.   There are punctate, round, and coarse heterogeneous calcifications which appear to be along a   linear distribution associated with a vessel.  These correspond to the upper outer aspect of the   right breast.  There is a subtle focal asymmetry within the upper-outer quadrant of the right breast on current   examination which appears fairly similar to the prior more remote examination from 2013 when   accounting for differences in technique.  US:  Sonographic grayscale and color Doppler images of the right breast were obtained with   representative examples submitted to PACs for interpretation.  Imaging at the 11 o'clock position   corresponding to the focal asymmetry within the upper-outer quadrant of the right breast   demonstrates no sonographic correlate.  There are incidental intramammary lymph nodes at the 11   o'clock position 13 cm from the nipple.   IMPRESSION:  1. Subtle punctate and fine pleomorphic calcifications within the outer aspect of the right breast   which appear new from prior examination.  Recommend right breast stereotactic biopsy for definitive   tissue diagnosis.  2.  Additional calcifications appear linear in distribution and appear to correlate to a small   vessel.  These are considered probably benign and attention on follow-up right diagnostic mammogram   6 months is recommended.  3. Right breast focal asymmetry appears like the more remote prior examination from 2013 when   accounting for differences in technique.  No sonographic correlate was identified.  This could also   be re-evaluated on the follow-up right diagnostic mammogram in 6 months.    BI-RADS 4: Suspicious    08/21/2023 Right Breast, Stereotactic Biopsy ( [facility]):  Right breast, outer, stereotactic-guided core biopsy:               - Atypical ductal hyperplasia (ADH) involving an intraductal papilloma  - Usual ductal hyperplasia (UDH)  - Columnar cell change  - Microcalcifications   Comment:  The above diagnosis was rendered in expert consultation by Mery Sarah MD , [facility] .  -Hydrocoil clip.    10/7/23, [facility]  In the middle third of the lateral aspect of the right breast at the 9 o'clock position centered on the order of 6.5 cm from the nipple there is a focal signal void seen at the inferior margin of non-mass enhancement that measures on the order of 1.4 cm in the anterior to posterior dimension, 0.8 cm in the superior to inferior dimension and 0.7 cm in the medial to lateral dimension. This corresponds to the biopsy-proven site of ADH with the coil-shaped metallic clip.   No other areas of suspicious enhancement or morphology are seen in the right breast. I see no evidence for abnormal right breast skin, nipple or chest wall enhancement and there is no evidence for right axillary or internal mammary chain adenopathy.  In the anterior one-third of the lateral aspect of the left breast centered at the 3:30 position on the order of 4 cm from the nipple there is linear enhancement that measures on the order of 1.5 cm in the anterior to posterior dimension, 0.6 cm  in the superior to inferior dimension and 0.7 cm in the medial to lateral dimension. No definite mammographic correlate is appreciated.   No areas of abnormal enhancement or morphology are otherwise seen in the left breast. I see no evidence for abnormal left breast skin, nipple or chest wall enhancement and there is no evidence for left axillary or internal mammary chain adenopathy.  BIRADS 4: Suspicious.       Review of Systems   Constitutional:  Negative for appetite change, chills, diaphoresis, fatigue, fever and unexpected weight change.        15 lb weight gain over the past year   HENT:   Negative for hearing loss, lump/mass, mouth sores, nosebleeds, sore throat, tinnitus, trouble swallowing and voice change.    Eyes:  Negative for eye problems and icterus.   Respiratory:  Negative for chest tightness, cough, hemoptysis, shortness of breath and wheezing.    Cardiovascular:  Negative for chest pain, leg swelling and palpitations.   Gastrointestinal:  Negative for abdominal distention, abdominal pain, blood in stool, constipation, diarrhea, nausea, rectal pain and vomiting.   Endocrine: Negative for hot flashes.   Genitourinary:  Negative for bladder incontinence, difficulty urinating, dyspareunia, dysuria, frequency, hematuria, menstrual problem, nocturia, pelvic pain, vaginal bleeding and vaginal discharge.    Musculoskeletal:  Positive for arthralgias and back pain. Negative for flank pain, gait problem, myalgias, neck pain and neck stiffness.   Skin:  Negative for itching, rash and wound.   Neurological:  Negative for dizziness, extremity weakness, gait problem, headaches, light-headedness, numbness, seizures and speech difficulty.   Hematological:  Negative for adenopathy. Bruises/bleeds easily.   Psychiatric/Behavioral:  Negative for confusion, decreased concentration, depression, sleep disturbance and suicidal ideas. The patient is not nervous/anxious.      Medications:    Current Outpatient Medications:      aspirin 81 MG chewable tablet, Chew 1 tablet Daily., Disp: , Rfl:     atorvastatin (LIPITOR) 10 MG tablet, Take 1 tablet by mouth 3 (Three) Times a Week. NIGHT, Disp: , Rfl:     azelastine (ASTELIN) 0.1 % nasal spray, 1 spray As Needed., Disp: , Rfl:     benzonatate (TESSALON) 200 MG capsule, Take 1 capsule by mouth As Needed., Disp: , Rfl:     Cannabidiol 100 MG/ML solution, Take 1 mg by mouth Daily., Disp: , Rfl:     Cholecalciferol (Vitamin D3) 1.25 MG (87918 UT) tablet, Take 1 tablet by mouth 2 (Two) Times a Week. NIGHT ON Sunday AND THURSDAY, Disp: , Rfl:     dapagliflozin Propanediol (Farxiga) 10 MG tablet, 10 mg., Disp: , Rfl:     fluticasone (FLONASE) 50 MCG/ACT nasal spray, 1 spray into the nostril(s) as directed by provider As Needed., Disp: , Rfl:     lisinopril-hydrochlorothiazide (PRINZIDE,ZESTORETIC) 20-25 MG per tablet, Take 1 tablet by mouth Daily., Disp: , Rfl:     loratadine (CLARITIN) 10 MG tablet, Take 1 tablet by mouth Daily., Disp: , Rfl:     montelukast (SINGULAIR) 10 MG tablet, Take 1 tablet by mouth Every Night., Disp: , Rfl:     Multiple Vitamins-Minerals (Multi Adult Gummies) chewable tablet, Chew 1 tablet Daily., Disp: , Rfl:     Omega-3 Fatty Acids (OMEGA-3 FISH OIL PO), Take 800 mg by mouth Daily., Disp: , Rfl:     omeprazole (priLOSEC) 20 MG capsule, 1 capsule every night at bedtime., Disp: , Rfl:     Allergies:  No Known Allergies    Past Medical History:   Diagnosis Date    Acid reflux     Arthritis     Bilateral foot pain 06/04/2021    Brain aneurysm 2020    Chest pain     Chronic kidney disease     Diabetes mellitus     Elevated cholesterol     Heel pain     HLD (hyperlipidemia)     HTN (hypertension)     Hyperlipemia     Seasonal allergies     SOB (shortness of breath)        Past Surgical History:   Procedure Laterality Date    CEREBRAL ANGIOGRAM      Brain aneurysm    COLONOSCOPY  2011    COLONOSCOPY N/A 02/01/2023    Procedure: COLONOSCOPY;   Surgeon: Alice Guerra MD;  Location: [facility] END[facility]COPY;  Service: Gastroenterology;  Laterality: N/A;  HEMORRHOIDS    ENDOSCOPY N/A 2023    Procedure: ESOPHAGOGASTRODUODENOSCOPY WITH BIOPSIES, ESOPHAGEAL BALLOON DILATATION 15-18MM AND 18-20MM;  Surgeon: Alice Guerra MD;  Location: [facility] ENDOSCOPY;  Service: Gastroenterology;  Laterality: N/A;  GASTRIC POLYPS, SCHATZKI'S RING, GASTRITIS, HIATAL HERNIA    UPPER GASTROINTESTINAL ENDOSCOPY         Family History   Problem Relation Age of Onset    Irritable bowel syndrome Mother     Heart disease Father     Diabetes Father     Cancer Brother     Diabetes Brother     Malig Hyperthermia Neg Hx        Social History     Socioeconomic History    Marital status: Single    Number of children: 0   Tobacco Use    Smoking status: Never    Smokeless tobacco: Never   Vaping Use    Vaping Use: Never used   Substance and Sexual Activity    Alcohol use: Not Currently     Comment: OCC    Drug use: Never    Sexual activity: Not Currently     Partners: Male     Patient drinks 1-2 servings of caffeine per day.     GYNECOLOGIC HISTORY:   . P: 0. AB: 1.  Last menstrual period: Postmenopausal  Age at menarche: 11  Age at first childbirth: n/a  Lactation/How long: n/a  Age at menopause: 55  Total years of oral contraceptive use: years ago  Total years of hormone replacement therapy: 0      Objective  PHYSICAL EXAMINATION  Vitals:    10/12/23 1107   BP: 136/78   Pulse: 97   Resp: 17   SpO2: 98%     ECOG 0 - Asymptomatic  General: NAD, well appearing  Psych: a&o x 3, normal mood and affect  Eyes: EOMI, no scleral icterus  ENMT: neck supple without masses or thyromegaly, mucus membranes moist  Resp: normal effort, CTAB  CV: RRR, no murmurs, no edema  GI: soft, NT, ND  MSK: normal gait, normal ROM in bilateral shoulders  Lymph nodes: no cervical, supraclavicular or axillary lymphadenopathy  Breast: symmetric, very large size, pendulous  Right:  No visible abnormalities on inspection while seated, with arms raised or hands on hips. No masses, skin changes, or nipple abnormalities.  Left: No visible abnormalities on inspection while seated, with arms raised or hands on hips. No masses, skin changes, or nipple abnormalities.    Right breast, in-office ultrasound: At 10:00, 10 cm FN, there is a HydroMARK clip visualized about 2 cm deep to the skin.       I have independently reviewed her imaging and here are my findings:   In the right lateral breast, there initially was a 14 mm cluster of calcifications.  This represents the biopsy-proven site of ADH marked with a HydroMARK clip.  MRI shows 14 mm of non-mass enhancement with the clip located inferiorly.  In the right upper outer quadrant, there is about 20 mm of linear calcifications which are suspicious.  Additionally in the right upper outer quadrant, there is a focal asymmetry without an ultrasound correlate, which is probably benign.  In the left breast at 330 on MRI, there is 1.5 cm of linear enhancement.      Assessment & Plan  Assessment:  1.  63 y.o. F with a new diagnosis of right breast atypical ductal hyperplasia.  This will eventually need surgical excision.  2.  She also has a suspicious cluster of calcifications in the upper outer posterior right breast.  This requires stereotactic biopsy.  3.  She has a probably benign right breast focal asymmetry on mammogram which is in imaging surveillance.  4.  She has a suspicious area of linear enhancement in the left breast on MRI.  5.  She has an increased lifetime risk of breast cancer (31%, TCv8, calculated 10/12/2023).    Discussion:  We reviewed her pathology and imaging reports. We discussed that the finding of ADH on core biopsy is associated with an approximately 15-20% risk of identifying DCIS or invasive carcinoma within the vicinity of the lesion. This cannot be recognized on the small volume of tissue obtained at percutaneous biopsy, so  excisional biopsy is required.    Before proceeding with surgery, we need to finish working up the right breast calcifications and left linear enhancement with additional biopsies.  I reassured her that I think the right breast focal asymmetry is okay to follow with repeat imaging.    Plan:  -Right breast stereotactic biopsy (posterior calcifications seen on 8/14/23 mammogram).  -Left breast MR guided biopsy.  -Follow-up with me after biopsies to review the results and discuss surgery, which at a minimum will be a right breast needle localized excisional biopsy.  -After surgery we will develop a plan to manage her increased lifetime risk of breast cancer.  She also will be due for a right diagnostic mammogram in 2/2024 for the focal asymmetry in imaging surveillance, however we may need to adjust this date based on when she completes surgery.    Audelia Andino MD    I spent 60 minutes caring for Becca on this date of service. This time includes time spent by me in the following activities: preparing for the visit, performing a medically appropriate examination and/or evaluation , counseling and educating the patient/family/caregiver, ordering medications, tests, or procedures, referring and communicating with other health care professionals (discussed her case on the phone with Dr. Farrell today), documenting information in the medical record, and independently interpreting results and communicating that information with the patient/family/caregiver.      CC:  MARINO Wilson

## 2023-10-12 NOTE — PROGRESS NOTES
[facility]     Referring Provider: MARINO Deng     Chief complaint: Right breast atypical ductal hyperplasia     Subjective   HPI: Ms. Becca Bansal is a 64 yo woman, seen at the request of MARINO Deng, for a new diagnosis of right breast atypical ductal hyperplasia.  She had not had a screening mammogram for 10 years, then in July of this year presented for a screening mammogram.  She was called back for multiple right breast findings.  Diagnostic imaging demonstrated 2 separate groups of calcifications and a right breast focal asymmetry without an ultrasound correlate.  She underwent biopsy of one of the groups of calcifications and this showed ADH.  The other group of calcifications and a focal asymmetry were placed in imaging surveillance.  Prior to this appointment, she underwent a breast MRI which showed an area of linear enhancement in the left breast. Her work-up is detailed in the breast history section below. Prior to the biopsy, she denies any breast lumps, pain, skin changes, or nipple discharge. She denies any prior history of abnormal mammograms or breast biopsies. She denies any family history of breast or ovarian cancer.       I personally reviewed her records and summarized her relevant breast history/imagin2013, Screening MMG ([facility]):  The current examination reveals no new abnormalities or suspicious changes in appearance.  BI-RADS 2    07/10/2023, Screening MMG with [facility] ( [facility]):   Right breast:  Focal asymmetry in the upper outer right breast middle 3rd depth with associated   grouped calcifications.  More anteriorly in the upper outer right breast is a smaller group of   calcifications.  Left breast:  Development of several course benign type left breast calcifications.  There are   developing vascular and secretory type calcifications visualized.  No architectural distortion or   suspicious mass.  BI-RADS 0:  Incomplete    08/14/2023, Right Diagnostic MMG with [facility] & Right Breast US ([facility]):  MMG:  Right diagnostic mammogram:      There are subtle punctate and fine pleomorphic calcifications within the outer aspect of the right   breast which appears new from the prior examination.   There are punctate, round, and coarse heterogeneous calcifications which appear to be along a   linear distribution associated with a vessel.  These correspond to the upper outer aspect of the   right breast.  There is a subtle focal asymmetry within the upper-outer quadrant of the right breast on current   examination which appears fairly similar to the prior more remote examination from 2013 when   accounting for differences in technique.  US:  Sonographic grayscale and color Doppler images of the right breast were obtained with   representative examples submitted to PACs for interpretation.  Imaging at the 11 o'clock position   corresponding to the focal asymmetry within the upper-outer quadrant of the right breast   demonstrates no sonographic correlate.  There are incidental intramammary lymph nodes at the 11   o'clock position 13 cm from the nipple.   IMPRESSION:  1. Subtle punctate and fine pleomorphic calcifications within the outer aspect of the right breast   which appear new from prior examination.  Recommend right breast stereotactic biopsy for definitive   tissue diagnosis.  2. Additional calcifications appear linear in distribution and appear to correlate to a small   vessel.  These are considered probably benign and attention on follow-up right diagnostic mammogram   6 months is recommended.  3. Right breast focal asymmetry appears like the more remote prior examination from 2013 when   accounting for differences in technique.  No sonographic correlate was identified.  This could also   be re-evaluated on the follow-up right diagnostic mammogram in 6 months.    BI-RADS 4: Suspicious    08/21/2023 Right Breast, Stereotactic  Biopsy ([facility]):  Right breast, outer, stereotactic-guided core biopsy:               - Atypical ductal hyperplasia (ADH) involving an intraductal papilloma  - Usual ductal hyperplasia (UDH)  - Columnar cell change  - Microcalcifications   Comment:  The above diagnosis was rendered in expert consultation by Mery Sarah MD , [facility] .  -Hydrocoil clip.    10/7/23, Bilateral Breast MRI ([facility]):  In the middle third of the lateral aspect of the right breast at the 9 o'clock position centered on the order of 6.5 cm from the nipple there is a focal signal void seen at the inferior margin of non-mass enhancement that measures on the order of 1.4 cm in the anterior to posterior dimension, 0.8 cm in the superior to inferior dimension and 0.7 cm in the medial to lateral dimension. This corresponds to the biopsy-proven site of ADH with the coil-shaped metallic clip.   No other areas of suspicious enhancement or morphology are seen in the right breast. I see no evidence for abnormal right breast skin, nipple or chest wall enhancement and there is no evidence for right axillary or internal mammary chain adenopathy.  In the anterior one-third of the lateral aspect of the left breast centered at the 3:30 position on the order of 4 cm from the nipple there is linear enhancement that measures on the order of 1.5 cm in the anterior to posterior dimension, 0.6 cm in the superior to inferior dimension and 0.7 cm in the medial to lateral dimension. No definite mammographic correlate is appreciated.   No areas of abnormal enhancement or morphology are otherwise seen in the left breast. I see no evidence for abnormal left breast skin, nipple or chest wall enhancement and there is no evidence for left axillary or internal mammary chain adenopathy.  BIRADS 4: Suspicious.       Review of Systems   Constitutional:  Negative for appetite change, chills, diaphoresis, fatigue, fever and unexpected weight change.        15  lb weight gain over the past year   HENT:   Negative for hearing loss, lump/mass, mouth sores, nosebleeds, sore throat, tinnitus, trouble swallowing and voice change.    Eyes:  Negative for eye problems and icterus.   Respiratory:  Negative for chest tightness, cough, hemoptysis, shortness of breath and wheezing.    Cardiovascular:  Negative for chest pain, leg swelling and palpitations.   Gastrointestinal:  Negative for abdominal distention, abdominal pain, blood in stool, constipation, diarrhea, nausea, rectal pain and vomiting.   Endocrine: Negative for hot flashes.   Genitourinary:  Negative for bladder incontinence, difficulty urinating, dyspareunia, dysuria, frequency, hematuria, menstrual problem, nocturia, pelvic pain, vaginal bleeding and vaginal discharge.    Musculoskeletal:  Positive for arthralgias and back pain. Negative for flank pain, gait problem, myalgias, neck pain and neck stiffness.   Skin:  Negative for itching, rash and wound.   Neurological:  Negative for dizziness, extremity weakness, gait problem, headaches, light-headedness, numbness, seizures and speech difficulty.   Hematological:  Negative for adenopathy. Bruises/bleeds easily.   Psychiatric/Behavioral:  Negative for confusion, decreased concentration, depression, sleep disturbance and suicidal ideas. The patient is not nervous/anxious.      Medications:    Current Outpatient Medications:     aspirin 81 MG chewable tablet, Chew 1 tablet Daily., Disp: , Rfl:     atorvastatin (LIPITOR) 10 MG tablet, Take 1 tablet by mouth 3 (Three) Times a Week. NIGHT, Disp: , Rfl:     azelastine (ASTELIN) 0.1 % nasal spray, 1 spray As Needed., Disp: , Rfl:     benzonatate (TESSALON) 200 MG capsule, Take 1 capsule by mouth As Needed., Disp: , Rfl:     Cannabidiol 100 MG/ML solution, Take 1 mg by mouth Daily., Disp: , Rfl:     Cholecalciferol (Vitamin D3) 1.25 MG (90625 UT) tablet, Take 1 tablet by mouth 2 (Two) Times a Week. NIGHT ON Sunday AND THURSDAY,  Disp: , Rfl:     dapagliflozin Propanediol (Farxiga) 10 MG tablet, 10 mg., Disp: , Rfl:     fluticasone (FLONASE) 50 MCG/ACT nasal spray, 1 spray into the nostril(s) as directed by provider As Needed., Disp: , Rfl:     lisinopril-hydrochlorothiazide (PRINZIDE,ZESTORETIC) 20-25 MG per tablet, Take 1 tablet by mouth Daily., Disp: , Rfl:     loratadine (CLARITIN) 10 MG tablet, Take 1 tablet by mouth Daily., Disp: , Rfl:     montelukast (SINGULAIR) 10 MG tablet, Take 1 tablet by mouth Every Night., Disp: , Rfl:     Multiple Vitamins-Minerals (Multi Adult Gummies) chewable tablet, Chew 1 tablet Daily., Disp: , Rfl:     Omega-3 Fatty Acids (OMEGA-3 FISH OIL PO), Take 800 mg by mouth Daily., Disp: , Rfl:     omeprazole (priLOSEC) 20 MG capsule, 1 capsule every night at bedtime., Disp: , Rfl:     Allergies:  No Known Allergies    Past Medical History:   Diagnosis Date    Acid reflux     Arthritis     Bilateral foot pain 06/04/2021    Brain aneurysm 2020    Chest pain     Chronic kidney disease     Diabetes mellitus     Elevated cholesterol     Heel pain     HLD (hyperlipidemia)     HTN (hypertension)     Hyperlipemia     Seasonal allergies     SOB (shortness of breath)        Past Surgical History:   Procedure Laterality Date    CEREBRAL ANGIOGRAM      Brain aneurysm    COLONOSCOPY  2011    COLONOSCOPY N/A 02/01/2023    Procedure: COLONOSCOPY;  Surgeon: Alice Guerra MD;  Location: [facility] ENDOSCOPY;  Service: Gastroenterology;  Laterality: N/A;  HEMORRHOIDS    ENDOSCOPY N/A 02/01/2023    Procedure: ESOPHAGOGASTRODUODENOSCOPY WITH BIOPSIES, ESOPHAGEAL BALLOON DILATATION 15-18MM AND 18-20MM;  Surgeon: Alice Guerra MD;  Location: formerly [facility] ENDOSCOPY;  Service: Gastroenterology;  Laterality: N/A;  GASTRIC POLYPS, SCHATZKI'S RING, GASTRITIS, HIATAL HERNIA    UPPER GASTROINTESTINAL ENDOSCOPY  2011       Family History   Problem Relation Age of Onset    Irritable bowel syndrome Mother     Heart disease Father      Diabetes Father     Cancer Brother     Diabetes Brother     Malig Hyperthermia Neg Hx        Social History     Socioeconomic History    Marital status: Single    Number of children: 0   Tobacco Use    Smoking status: Never    Smokeless tobacco: Never   Vaping Use    Vaping Use: Never used   Substance and Sexual Activity    Alcohol use: Not Currently     Comment: OCC    Drug use: Never    Sexual activity: Not Currently     Partners: Male     Patient drinks 1-2 servings of caffeine per day.     GYNECOLOGIC HISTORY:   . P: 0. AB: 1.  Last menstrual period: Postmenopausal  Age at menarche: 11  Age at first childbirth: n/a  Lactation/How long: n/a  Age at menopause: 55  Total years of oral contraceptive use: years ago  Total years of hormone replacement therapy: 0      Objective   PHYSICAL EXAMINATION  Vitals:    10/12/23 1107   BP: 136/78   Pulse: 97   Resp: 17   SpO2: 98%     ECOG 0 - Asymptomatic  General: NAD, well appearing  Psych: a&o x 3, normal mood and affect  Eyes: EOMI, no scleral icterus  ENMT: neck supple without masses or thyromegaly, mucus membranes moist  Resp: normal effort, CTAB  CV: RRR, no murmurs, no edema  GI: soft, NT, ND  MSK: normal gait, normal ROM in bilateral shoulders  Lymph nodes: no cervical, supraclavicular or axillary lymphadenopathy  Breast: symmetric, very large size, pendulous  Right: No visible abnormalities on inspection while seated, with arms raised or hands on hips. No masses, skin changes, or nipple abnormalities.  Left: No visible abnormalities on inspection while seated, with arms raised or hands on hips. No masses, skin changes, or nipple abnormalities.    Right breast, in-office ultrasound: At 10:00, 10 cm FN, there is a HydroMARK clip visualized about 2 cm deep to the skin.       I have independently reviewed her imaging and here are my findings:   In the right lateral breast, there initially was a 14 mm cluster of calcifications.  This represents the biopsy-proven site  of ADH marked with a HydroMARK clip.  MRI shows 14 mm of non-mass enhancement with the clip located inferiorly.  In the right upper outer quadrant, there is about 20 mm of linear calcifications which are suspicious.  Additionally in the right upper outer quadrant, there is a focal asymmetry without an ultrasound correlate, which is probably benign.  In the left breast at 330 on MRI, there is 1.5 cm of linear enhancement.      Assessment & Plan   Assessment:  1.  63 y.o. F with a new diagnosis of right breast atypical ductal hyperplasia.  This will eventually need surgical excision.  2.  She also has a suspicious cluster of calcifications in the upper outer posterior right breast.  This requires stereotactic biopsy.  3.  She has a probably benign right breast focal asymmetry on mammogram which is in imaging surveillance.  4.  She has a suspicious area of linear enhancement in the left breast on MRI.  5.  She has an increased lifetime risk of breast cancer (31%, TCv8, calculated 10/12/2023).    Discussion:  We reviewed her pathology and imaging reports. We discussed that the finding of ADH on core biopsy is associated with an approximately 15-20% risk of identifying DCIS or invasive carcinoma within the vicinity of the lesion. This cannot be recognized on the small volume of tissue obtained at percutaneous biopsy, so excisional biopsy is required.    Before proceeding with surgery, we need to finish working up the right breast calcifications and left linear enhancement with additional biopsies.  I reassured her that I think the right breast focal asymmetry is okay to follow with repeat imaging.    Plan:  -Right breast stereotactic biopsy (posterior calcifications seen on 8/14/23 mammogram).  -Left breast MR guided biopsy.  -Follow-up with me after biopsies to review the results and discuss surgery, which at a minimum will be a right breast needle localized excisional biopsy.  -After surgery we will develop a plan to  manage her increased lifetime risk of breast cancer.  She also will be due for a right diagnostic mammogram in 2/2024 for the focal asymmetry in imaging surveillance, however we may need to adjust this date based on when she completes surgery.    Audelia Andino MD    I spent 60 minutes caring for Becca on this date of service. This time includes time spent by me in the following activities: preparing for the visit, performing a medically appropriate examination and/or evaluation , counseling and educating the patient/family/caregiver, ordering medications, tests, or procedures, referring and communicating with other health care professionals (discussed her case on the phone with Dr. Farrell today), documenting information in the medical record, and independently interpreting results and communicating that information with the patient/family/caregiver.      CC:  MARINO Wilson

## 2023-10-19 ENCOUNTER — TELEPHONE (OUTPATIENT)
Dept: MAMMOGRAPHY | Facility: HOSPITAL | Age: 63
End: 2023-10-19
Payer: MEDICARE

## 2023-10-19 NOTE — TELEPHONE ENCOUNTER
Spoke with Becca in Dr. Odonnell's office f/u regarding fax sent 10/13 requesting if okay for patient to hold blood thinner prior to biopsy. Becca sent Dr. Odonnell's MA a message regarding this question and included fax/office number to call with questions or concerns.

## 2023-10-20 ENCOUNTER — TELEPHONE (OUTPATIENT)
Dept: MAMMOGRAPHY | Facility: HOSPITAL | Age: 63
End: 2023-10-20
Payer: MEDICARE

## 2023-10-20 NOTE — TELEPHONE ENCOUNTER
Received fax from Dr. Odonnell's office stating patient should not hold Aspirin prior to upcoming breast biopsy per MARINO Ivy. This RN spoke with Dr. Farrell to ensure okay to still proceed. Dr. Farrell okay to proceed with biopsies and take extra bleed precautions. This RN called and spoke with patient to not hold her Aspirin and answered all patient questions.

## 2023-10-25 ENCOUNTER — HOSPITAL ENCOUNTER (OUTPATIENT)
Dept: MAMMOGRAPHY | Facility: HOSPITAL | Age: 63
Discharge: HOME | End: 2023-10-25
Payer: MEDICARE

## 2023-10-25 ENCOUNTER — HOSPITAL ENCOUNTER (OUTPATIENT)
Dept: MRI IMAGING | Facility: HOSPITAL | Age: 63
Discharge: HOME | End: 2023-10-25
Payer: MEDICARE

## 2023-10-25 VITALS
HEART RATE: 110 BPM | DIASTOLIC BLOOD PRESSURE: 81 MMHG | BODY MASS INDEX: 38.62 KG/M2 | SYSTOLIC BLOOD PRESSURE: 156 MMHG | WEIGHT: 218 LBS | TEMPERATURE: 98 F | RESPIRATION RATE: 16 BRPM | HEIGHT: 63 IN | OXYGEN SATURATION: 97 %

## 2023-10-25 DIAGNOSIS — R92.8 ABNORMAL MRI, BREAST: ICD-10-CM

## 2023-10-25 DIAGNOSIS — Z98.890 STATUS POST BIOPSY: ICD-10-CM

## 2023-10-25 LAB — CREAT BLDA-MCNC: 1.3 MG/DL (ref 0.6–1.3)

## 2023-10-25 PROCEDURE — 82565 ASSAY OF CREATININE: CPT

## 2023-10-25 RX ORDER — DIAZEPAM 5 MG/1
5 TABLET ORAL ONCE AS NEEDED
OUTPATIENT
Start: 2023-11-09

## 2023-10-25 NOTE — LACTATION NOTE
Name: Becca Bansal ADMIT: 10/25/2023   : 1960  PCP: Nat Paez APRN    MRN: [id number] LOS: 0 days   AGE/SEX: 63 y.o. female  ROOM: Room/bed info not found       Chief complaint left breast linear enhancement    Present Illness or Internal History:  Patient is a 63 y.o. female presents with left breast linear enhancement.     Past Surgical History:  Past Surgical History:   Procedure Laterality Date    BREAST BIOPSY Right 2023    Atypical Ductal Hyperplasia     ([facility])    CEREBRAL ANGIOGRAM      Brain aneurysm    COLONOSCOPY      COLONOSCOPY N/A 2023    Procedure: COLONOSCOPY;  Surgeon: Alice Guerra MD;  Location: [facility] ENDOSCOPY;  Service: Gastroenterology;  Laterality: N/A;  HEMORRHOIDS    ENDOSCOPY N/A 2023    Procedure: ESOPHAGOGASTRODUODENOSCOPY WITH BIOPSIES, ESOPHAGEAL BALLOON DILATATION 15-18MM AND 18-20MM;  Surgeon: Alice Guerra MD;  Location: [facility] ENDOSCOPY;  Service: Gastroenterology;  Laterality: N/A;  GASTRIC POLYPS, SCHATZKI'S RING, GASTRITIS, HIATAL HERNIA    UPPER GASTROINTESTINAL ENDOSCOPY         Past Medical History:  Past Medical History:   Diagnosis Date    Acid reflux     Arthritis     Bilateral foot pain 2021    Brain aneurysm     Chest pain     Chronic kidney disease     Diabetes mellitus     Elevated cholesterol     Heel pain     HLD (hyperlipidemia)     HTN (hypertension)     Hyperlipemia     Seasonal allergies     SOB (shortness of breath)        Home Medications:  (Not in a hospital admission)      Allergies:  Patient has no known allergies.    Family History:  Family History   Problem Relation Age of Onset    Irritable bowel syndrome Mother     Heart disease Father     Diabetes Father     No Known Problems Sister     Cancer Brother     Diabetes Brother     Esophageal cancer Brother     No Known Problems Brother     No Known Problems Brother     No Known Problems Brother     No Known  Problems Brother     Malleigh Hyperthermia Neg Hx        Social History:  Social History     Tobacco Use    Smoking status: Never    Smokeless tobacco: Never   Vaping Use    Vaping Use: Never used   Substance Use Topics    Alcohol use: Not Currently     Comment: OCC    Drug use: Never        Objective     Physical Exam:    No exam performed today,    Vital Signs  Temp:  [98 °F (36.7 °C)] 98 °F (36.7 °C)  Heart Rate:  [110] 110  Resp:  [16] 16  BP: (156)/(81) 156/81    Anticipated Surgical Procedure:  MRI guided left breast biopsy with clip placement    The risks, benefits and alternatives of this procedure have been discussed with the patient or responsible party: Yes        Nathaniel Farrell Jr., MD  10/25/23  09:58 EDT

## 2023-10-26 ENCOUNTER — TRANSCRIBE ORDERS (OUTPATIENT)
Dept: ADMINISTRATIVE | Facility: HOSPITAL | Age: 63
End: 2023-10-26
Payer: MEDICARE

## 2023-10-26 DIAGNOSIS — N60.91 ATYPICAL HYPERPLASIA OF RIGHT BREAST: Primary | ICD-10-CM

## 2023-10-27 ENCOUNTER — HOSPITAL ENCOUNTER (OUTPATIENT)
Dept: MRI IMAGING | Facility: HOSPITAL | Age: 63
Discharge: HOME | End: 2023-10-27
Admitting: SURGERY
Payer: MEDICARE

## 2023-10-27 DIAGNOSIS — N60.91 ATYPICAL HYPERPLASIA OF RIGHT BREAST: ICD-10-CM

## 2023-10-27 LAB — CREAT BLDA-MCNC: 1.2 MG/DL (ref 0.6–1.3)

## 2023-10-27 PROCEDURE — 82565 ASSAY OF CREATININE: CPT

## 2023-10-27 PROCEDURE — A9577 INJ MULTIHANCE: HCPCS | Performed by: SURGERY

## 2023-10-27 PROCEDURE — 0 GADOBENATE DIMEGLUMINE 529 MG/ML SOLUTION: Performed by: SURGERY

## 2023-10-27 RX ADMIN — GADOBENATE DIMEGLUMINE 20 ML: 529 INJECTION, SOLUTION INTRAVENOUS at 13:59

## 2023-11-14 ENCOUNTER — HOSPITAL ENCOUNTER (OUTPATIENT)
Dept: MAMMOGRAPHY | Facility: HOSPITAL | Age: 63
Discharge: HOME | End: 2023-11-14
Payer: MEDICARE

## 2023-11-14 ENCOUNTER — HOSPITAL ENCOUNTER (OUTPATIENT)
Dept: MRI IMAGING | Facility: HOSPITAL | Age: 63
Discharge: HOME | End: 2023-11-14
Payer: MEDICARE

## 2023-11-14 VITALS
WEIGHT: 218 LBS | DIASTOLIC BLOOD PRESSURE: 79 MMHG | OXYGEN SATURATION: 95 % | HEIGHT: 63 IN | HEART RATE: 77 BPM | TEMPERATURE: 97.1 F | RESPIRATION RATE: 16 BRPM | BODY MASS INDEX: 38.62 KG/M2 | SYSTOLIC BLOOD PRESSURE: 117 MMHG

## 2023-11-14 VITALS
RESPIRATION RATE: 16 BRPM | HEART RATE: 72 BPM | DIASTOLIC BLOOD PRESSURE: 72 MMHG | OXYGEN SATURATION: 99 % | SYSTOLIC BLOOD PRESSURE: 124 MMHG

## 2023-11-14 DIAGNOSIS — R92.8 ABNORMAL FINDING ON BREAST IMAGING: ICD-10-CM

## 2023-11-14 DIAGNOSIS — R92.8 ABNORMAL MRI, BREAST: ICD-10-CM

## 2023-11-14 LAB — CREAT BLDA-MCNC: 1.4 MG/DL (ref 0.6–1.3)

## 2023-11-14 PROCEDURE — 77065 DX MAMMO INCL CAD UNI: CPT

## 2023-11-14 PROCEDURE — 0 GADOBENATE DIMEGLUMINE 529 MG/ML SOLUTION: Performed by: SURGERY

## 2023-11-14 PROCEDURE — 88360 TUMOR IMMUNOHISTOCHEM/MANUAL: CPT | Performed by: SURGERY

## 2023-11-14 PROCEDURE — 88305 TISSUE EXAM BY PATHOLOGIST: CPT | Performed by: SURGERY

## 2023-11-14 PROCEDURE — 88342 IMHCHEM/IMCYTCHM 1ST ANTB: CPT | Performed by: SURGERY

## 2023-11-14 PROCEDURE — 25010000002 LIDOCAINE 1 % SOLUTION: Performed by: SURGERY

## 2023-11-14 PROCEDURE — A9577 INJ MULTIHANCE: HCPCS | Performed by: SURGERY

## 2023-11-14 PROCEDURE — C1894 INTRO/SHEATH, NON-LASER: HCPCS

## 2023-11-14 PROCEDURE — 82565 ASSAY OF CREATININE: CPT

## 2023-11-14 PROCEDURE — A4648 IMPLANTABLE TISSUE MARKER: HCPCS

## 2023-11-14 RX ORDER — DIAZEPAM 5 MG/1
5 TABLET ORAL ONCE AS NEEDED
Status: DISCONTINUED | OUTPATIENT
Start: 2023-11-14 | End: 2023-11-15 | Stop reason: HOSPADM

## 2023-11-14 RX ORDER — LIDOCAINE HYDROCHLORIDE 10 MG/ML
10 INJECTION, SOLUTION INFILTRATION; PERINEURAL ONCE
Status: COMPLETED | OUTPATIENT
Start: 2023-11-14 | End: 2023-11-14

## 2023-11-14 RX ORDER — LIDOCAINE HYDROCHLORIDE 10 MG/ML
1 INJECTION, SOLUTION INFILTRATION; PERINEURAL ONCE
Status: COMPLETED | OUTPATIENT
Start: 2023-11-14 | End: 2023-11-14

## 2023-11-14 RX ADMIN — LIDOCAINE HYDROCHLORIDE 13 ML: 10; .005 INJECTION, SOLUTION EPIDURAL; INFILTRATION; INTRACAUDAL; PERINEURAL at 11:51

## 2023-11-14 RX ADMIN — LIDOCAINE HYDROCHLORIDE 1 ML: 10 INJECTION, SOLUTION INFILTRATION; PERINEURAL at 10:58

## 2023-11-14 RX ADMIN — Medication 1 ML: at 11:51

## 2023-11-14 RX ADMIN — GADOBENATE DIMEGLUMINE 20 ML: 529 INJECTION, SOLUTION INTRAVENOUS at 10:29

## 2023-11-14 RX ADMIN — LIDOCAINE HYDROCHLORIDE 15 ML: 10; .005 INJECTION, SOLUTION EPIDURAL; INFILTRATION; INTRACAUDAL; PERINEURAL at 10:58

## 2023-11-14 NOTE — NURSING NOTE
Patient has been consented for procedure. Taken for Stereotactic biopsy of right breast at this time.

## 2023-11-14 NOTE — NURSING NOTE
Biopsy site to left outer mid breast clear with Dermabond dry and intact. No firmness or swelling noted at or around biopsy site. Denies pain. Patient to go for Stereotactic biopsy as soon as the room is set up. Patient wearing mask the entire encounter.

## 2023-11-14 NOTE — H&P
Name: Becca Bansal ADMIT: 2023   : 1960  PCP: Nat Paez APRN    MRN: [id number] LOS: 0 days   AGE/SEX: 63 y.o. female  ROOM: Room/bed info not found       Chief complaint left breast linear enhancement    Present Illness or Internal History:  Patient is a 63 y.o. female presents with left breast linear enhancement.     Past Surgical History:  Past Surgical History:   Procedure Laterality Date    BREAST BIOPSY Right 2023    Atypical Ductal Hyperplasia     ([facility])    CEREBRAL ANGIOGRAM      Brain aneurysm    COLONOSCOPY      COLONOSCOPY N/A 2023    Procedure: COLONOSCOPY;  Surgeon: Alice Guerra MD;  Location: [facility] ENDOSCOPY;  Service: Gastroenterology;  Laterality: N/A;  HEMORRHOIDS    ENDOSCOPY N/A 2023    Procedure: ESOPHAGOGASTRODUODENOSCOPY WITH BIOPSIES, ESOPHAGEAL BALLOON DILATATION 15-18MM AND 18-20MM;  Surgeon: Alice Guerra MD;  Location: [facility] ENDOSCOPY;  Service: Gastroenterology;  Laterality: N/A;  GASTRIC POLYPS, SCHATZKI'S RING, GASTRITIS, HIATAL HERNIA    UPPER GASTROINTESTINAL ENDOSCOPY         Past Medical History:  Past Medical History:   Diagnosis Date    Acid reflux     Arthritis     Bilateral foot pain 2021    Brain aneurysm 2020    Chest pain     Chronic kidney disease     COVID-19 2023    Diabetes mellitus     Elevated cholesterol     Heel pain     HLD (hyperlipidemia)     HTN (hypertension)     Hyperlipemia     Seasonal allergies     Sleep apnea     SOB (shortness of breath)        Home Medications:  (Not in a hospital admission)      Allergies:  Patient has no known allergies.    Family History:  Family History   Problem Relation Age of Onset    Irritable bowel syndrome Mother     Heart disease Father     Diabetes Father     No Known Problems Sister     Cancer Brother     Diabetes Brother     Esophageal cancer Brother     No Known Problems Brother     No Known Problems Brother      No Known Problems Brother     No Known Problems Brother     Malig Hyperthermia Neg Hx        Social History:  Social History     Tobacco Use    Smoking status: Never    Smokeless tobacco: Never   Vaping Use    Vaping Use: Never used   Substance Use Topics    Alcohol use: Not Currently     Comment: OCC    Drug use: Never        Objective     Physical Exam:    No exam performed today,    Vital Signs  Temp:  [97.1 °F (36.2 °C)] 97.1 °F (36.2 °C)  Heart Rate:  [97] 97  Resp:  [16] 16  BP: (126)/(71) 126/71    Anticipated Surgical Procedure:  MRI guided left breast biopsy with clip placement    The risks, benefits and alternatives of this procedure have been discussed with the patient or responsible party: Yes        Nathaniel Farrell Jr., MD  11/14/23  10:00 EST

## 2023-11-14 NOTE — NURSING NOTE
Stereotactic biopsy site to right upper breast clear with Dermabond dry and intact. MRI biopsy site to left outer mid breast remains unchanged. No firmness or swelling noted at or around either biopsy site. Denies pain. Ice packs with protective coverings applied to each biopsy site. Discharge instructions discussed with understanding voiced by patient. Copies provided to patient. No distress noted. To home via private vehicle. Patient wearing protective mask throughout this entire encounter.

## 2023-11-15 ENCOUNTER — TELEPHONE (OUTPATIENT)
Dept: MAMMOGRAPHY | Facility: HOSPITAL | Age: 63
End: 2023-11-15
Payer: MEDICARE

## 2023-11-15 LAB
LAB AP CASE REPORT: NORMAL
LAB AP CASE REPORT: NORMAL
LAB AP DIAGNOSIS COMMENT: NORMAL
LAB AP DIAGNOSIS COMMENT: NORMAL
LAB AP SPECIAL STAINS: NORMAL
LAB AP SPECIAL STAINS: NORMAL
LAB AP SYNOPTIC CHECKLIST: NORMAL
LAB AP SYNOPTIC CHECKLIST: NORMAL
PATH REPORT.FINAL DX SPEC: NORMAL
PATH REPORT.FINAL DX SPEC: NORMAL
PATH REPORT.GROSS SPEC: NORMAL
PATH REPORT.GROSS SPEC: NORMAL

## 2023-11-16 ENCOUNTER — PREP FOR SURGERY (OUTPATIENT)
Dept: OTHER | Facility: HOSPITAL | Age: 63
End: 2023-11-16

## 2023-11-16 ENCOUNTER — HOSPITAL ENCOUNTER (OUTPATIENT)
Dept: SURGERY | Facility: HOSPITAL | Age: 63
Discharge: HOME | End: 2023-11-16
Payer: MEDICARE

## 2023-11-16 ENCOUNTER — TRANSCRIBE ORDERS (OUTPATIENT)
Dept: LAB | Facility: HOSPITAL | Age: 63
End: 2023-11-16
Payer: MEDICARE

## 2023-11-16 ENCOUNTER — OFFICE VISIT (OUTPATIENT)
Dept: SURGERY | Facility: CLINIC | Age: 63
End: 2023-11-16
Payer: MEDICARE

## 2023-11-16 ENCOUNTER — LAB (OUTPATIENT)
Dept: LAB | Facility: HOSPITAL | Age: 63
End: 2023-11-16
Payer: MEDICARE

## 2023-11-16 VITALS
SYSTOLIC BLOOD PRESSURE: 142 MMHG | BODY MASS INDEX: 42.41 KG/M2 | WEIGHT: 216 LBS | OXYGEN SATURATION: 97 % | DIASTOLIC BLOOD PRESSURE: 78 MMHG | HEART RATE: 76 BPM | HEIGHT: 60 IN

## 2023-11-16 DIAGNOSIS — N18.31 CHRONIC KIDNEY DISEASE (CKD) STAGE G3A/A1, MODERATELY DECREASED GLOMERULAR FILTRATION RATE (GFR) BETWEEN 45-59 ML/MIN/1.73 SQUARE METER AND ALBUMINURIA CREATININE RATIO LESS THAN 30 MG/G (CMS/H*: ICD-10-CM

## 2023-11-16 DIAGNOSIS — I10 HYPERTENSION, ESSENTIAL: ICD-10-CM

## 2023-11-16 DIAGNOSIS — D05.11 DUCTAL CARCINOMA IN SITU (DCIS) OF RIGHT BREAST: Primary | ICD-10-CM

## 2023-11-16 DIAGNOSIS — D24.2 INTRADUCTAL PAPILLOMA OF LEFT BREAST: ICD-10-CM

## 2023-11-16 DIAGNOSIS — N28.9 URETERAL SLUDGE: ICD-10-CM

## 2023-11-16 DIAGNOSIS — E78.00 PURE HYPERCHOLESTEROLEMIA: ICD-10-CM

## 2023-11-16 DIAGNOSIS — N60.91 ATYPICAL DUCTAL HYPERPLASIA OF RIGHT BREAST: ICD-10-CM

## 2023-11-16 DIAGNOSIS — R73.03 DIABETES MELLITUS, LATENT: ICD-10-CM

## 2023-11-16 DIAGNOSIS — N18.31 CHRONIC KIDNEY DISEASE (CKD) STAGE G3A/A1, MODERATELY DECREASED GLOMERULAR FILTRATION RATE (GFR) BETWEEN 45-59 ML/MIN/1.73 SQUARE METER AND ALBUMINURIA CREATININE RATIO LESS THAN 30 MG/G (CMS/H*: Primary | ICD-10-CM

## 2023-11-16 LAB
ALBUMIN SERPL-MCNC: 4.3 G/DL (ref 3.5–5.2)
ANION GAP SERPL CALCULATED.3IONS-SCNC: 9 MMOL/L (ref 5–15)
BASOPHILS # BLD AUTO: 0.04 10*3/MM3 (ref 0–0.2)
BASOPHILS NFR BLD AUTO: 0.5 % (ref 0–1.5)
BILIRUB UR QL STRIP: NEGATIVE
BUN SERPL-MCNC: 28 MG/DL (ref 8–23)
BUN/CREAT SERPL: 21.7 (ref 7–25)
CALCIUM SPEC-SCNC: 9.9 MG/DL (ref 8.6–10.5)
CHLORIDE SERPL-SCNC: 103 MMOL/L (ref 98–107)
CLARITY UR: CLEAR
CO2 SERPL-SCNC: 29 MMOL/L (ref 22–29)
COLOR UR: YELLOW
CREAT SERPL-MCNC: 1.29 MG/DL (ref 0.57–1)
CREAT UR-MCNC: 113.8 MG/DL
DEPRECATED RDW RBC AUTO: 44.4 FL (ref 37–54)
EGFRCR SERPLBLD CKD-EPI 2021: 46.2 ML/MIN/1.73
EOSINOPHIL # BLD AUTO: 0.25 10*3/MM3 (ref 0–0.4)
EOSINOPHIL NFR BLD AUTO: 3.2 % (ref 0.3–6.2)
ERYTHROCYTE [DISTWIDTH] IN BLOOD BY AUTOMATED COUNT: 14.5 % (ref 12.3–15.4)
GLUCOSE SERPL-MCNC: 106 MG/DL (ref 65–99)
GLUCOSE UR STRIP-MCNC: ABNORMAL MG/DL
HCT VFR BLD AUTO: 40.1 % (ref 34–46.6)
HGB BLD-MCNC: 13.2 G/DL (ref 12–15.9)
HGB UR QL STRIP.AUTO: NEGATIVE
IMM GRANULOCYTES # BLD AUTO: 0.01 10*3/MM3 (ref 0–0.05)
IMM GRANULOCYTES NFR BLD AUTO: 0.1 % (ref 0–0.5)
KETONES UR QL STRIP: NEGATIVE
LEUKOCYTE ESTERASE UR QL STRIP.AUTO: NEGATIVE
LYMPHOCYTES # BLD AUTO: 2.1 10*3/MM3 (ref 0.7–3.1)
LYMPHOCYTES NFR BLD AUTO: 27 % (ref 19.6–45.3)
MCH RBC QN AUTO: 27.4 PG (ref 26.6–33)
MCHC RBC AUTO-ENTMCNC: 32.9 G/DL (ref 31.5–35.7)
MCV RBC AUTO: 83.4 FL (ref 79–97)
MONOCYTES # BLD AUTO: 0.52 10*3/MM3 (ref 0.1–0.9)
MONOCYTES NFR BLD AUTO: 6.7 % (ref 5–12)
NEUTROPHILS NFR BLD AUTO: 4.86 10*3/MM3 (ref 1.7–7)
NEUTROPHILS NFR BLD AUTO: 62.5 % (ref 42.7–76)
NITRITE UR QL STRIP: NEGATIVE
NRBC BLD AUTO-RTO: 0 /100 WBC (ref 0–0.2)
PH UR STRIP.AUTO: 5.5 [PH] (ref 5–8)
PHOSPHATE SERPL-MCNC: 2.7 MG/DL (ref 2.5–4.5)
PLATELET # BLD AUTO: 326 10*3/MM3 (ref 140–450)
PMV BLD AUTO: 10 FL (ref 6–12)
POTASSIUM SERPL-SCNC: 4.2 MMOL/L (ref 3.5–5.2)
PROT ?TM UR-MCNC: 6.7 MG/DL
PROT UR QL STRIP: NEGATIVE
PROT/CREAT UR: 0.06 MG/G{CREAT}
RBC # BLD AUTO: 4.81 10*6/MM3 (ref 3.77–5.28)
SODIUM SERPL-SCNC: 141 MMOL/L (ref 136–145)
SP GR UR STRIP: 1.03 (ref 1–1.03)
UROBILINOGEN UR QL STRIP: ABNORMAL
WBC NRBC COR # BLD AUTO: 7.78 10*3/MM3 (ref 3.4–10.8)

## 2023-11-16 PROCEDURE — 36415 COLL VENOUS BLD VENIPUNCTURE: CPT

## 2023-11-16 PROCEDURE — 80069 RENAL FUNCTION PANEL: CPT

## 2023-11-16 PROCEDURE — 81003 URINALYSIS AUTO W/O SCOPE: CPT

## 2023-11-16 PROCEDURE — 84156 ASSAY OF PROTEIN URINE: CPT

## 2023-11-16 PROCEDURE — 85025 COMPLETE CBC W/AUTO DIFF WBC: CPT

## 2023-11-16 PROCEDURE — 82570 ASSAY OF URINE CREATININE: CPT

## 2023-11-16 RX ORDER — DIAZEPAM 5 MG/1
10 TABLET ORAL ONCE
Status: CANCELLED | OUTPATIENT
Start: 2023-12-19 | End: 2023-11-16

## 2023-11-16 RX ORDER — CEFAZOLIN SODIUM 2 G/100ML
2000 INJECTION, SOLUTION INTRAVENOUS ONCE
Status: CANCELLED | OUTPATIENT
Start: 2023-12-19 | End: 2023-11-16

## 2023-11-16 NOTE — LETTER
November 16, 2023       No Recipients    Patient: Becca Bansal   YOB: 1960   Date of Visit: 11/16/2023     Dear MARINO Wilson:       Thank you for referring Becca Bansal to me for evaluation. Below are the relevant portions of my assessment and plan of care.    If you have questions, please do not hesitate to call me. I look forward to following Becca along with you.         Sincerely,        Audelia Andino MD        CC:   No Recipients    Audelia Andino MD  11/16/23 1406  Sign when Signing Visit  [facility]     Referring Provider: MARINO Deng     Chief complaint: Newly diagnosed right breast DCIS     Subjective  HPI:   10/12/23:  Ms. Becca Bansal is a 64 yo woman, seen at the request of MARINO Deng, for a new diagnosis of right breast atypical ductal hyperplasia.  She had not had a screening mammogram for 10 years, then in July of this year presented for a screening mammogram.  She was called back for multiple right breast findings.  Diagnostic imaging demonstrated 2 separate groups of calcifications and a right breast focal asymmetry without an ultrasound correlate.  She underwent biopsy of one of the groups of calcifications and this showed ADH.  The other group of calcifications and a focal asymmetry were placed in imaging surveillance.  Prior to this appointment, she underwent a breast MRI which showed an area of linear enhancement in the left breast. Her work-up is detailed in the breast history section below. Prior to the biopsy, she denies any breast lumps, pain, skin changes, or nipple discharge. She denies any prior history of abnormal mammograms or breast biopsies. She denies any family history of breast or ovarian cancer.     11/16/23, Interval History:  After her last visit, she underwent a right stereotactic biopsy which showed DCIS and a left MRI guided biopsy which showed an intraductal papilloma.  She returns  today to discuss the results.  She was joined in clinic by her niece.       Oncology/Hematology History   Ductal carcinoma in situ (DCIS) of right breast   1/12/2023 Imaging    Screening MMG ([facility]):  The current examination reveals no new abnormalities or suspicious changes in appearance.  BI-RADS 2     7/10/2023 Initial Diagnosis    Ductal carcinoma in situ (DCIS) of right breast     7/10/2023 Imaging    Screening MMG with Reggie ([facility]):   Right breast:  Focal asymmetry in the upper outer right breast middle 3rd depth with associated   grouped calcifications.  More anteriorly in the upper outer right breast is a smaller group of   calcifications.  Left breast:  Development of several course benign type left breast calcifications.  There are   developing vascular and secretory type calcifications visualized.  No architectural distortion or   suspicious mass.  BI-RADS 0: Incomplete     8/14/2023 Imaging    Right Diagnostic MMG with Reggie & Right Breast US ([facility]):  MMG:  Right diagnostic mammogram:      There are subtle punctate and fine pleomorphic calcifications within the outer aspect of the right   breast which appears new from the prior examination.   There are punctate, round, and coarse heterogeneous calcifications which appear to be along a   linear distribution associated with a vessel.  These correspond to the upper outer aspect of the   right breast.  There is a subtle focal asymmetry within the upper-outer quadrant of the right breast on current   examination which appears fairly similar to the prior more remote examination from 2013 when   accounting for differences in technique.  US:  Sonographic grayscale and color Doppler images of the right breast were obtained with   representative examples submitted to PACs for interpretation.  Imaging at the 11 o'clock position   corresponding to the focal asymmetry within the upper-outer quadrant of the right breast   demonstrates no  sonographic correlate.  There are incidental intramammary lymph nodes at the 11   o'clock position 13 cm from the nipple.   IMPRESSION:  1. Subtle punctate and fine pleomorphic calcifications within the outer aspect of the right breast   which appear new from prior examination.  Recommend right breast stereotactic biopsy for definitive   tissue diagnosis.  2. Additional calcifications appear linear in distribution and appear to correlate to a small   vessel.  These are considered probably benign and attention on follow-up right diagnostic mammogram   6 months is recommended.  3. Right breast focal asymmetry appears like the more remote prior examination from 2013 when   accounting for differences in technique.  No sonographic correlate was identified.  This could also   be re-evaluated on the follow-up right diagnostic mammogram in 6 months.    BI-RADS 4: Suspicious     8/21/2023 Biopsy    Right Breast, Stereotactic Biopsy ( [facility]):  Right breast, outer, stereotactic-guided core biopsy:               - Atypical ductal hyperplasia (ADH) involving an intraductal papilloma  - Usual ductal hyperplasia (UDH)  - Columnar cell change  - Microcalcifications   Comment:  The above diagnosis was rendered in expert consultation by Mery Sarah MD , [facility] .  -Hydrocoil clip.     10/7/2023 Imaging    Bilateral Breast MRI ([facility]):  In the middle third of the lateral aspect of the right breast at the 9 o'clock position centered on the order of 6.5 cm from the nipple there is a focal signal void seen at the inferior margin of non-mass enhancement that measures on the order of 1.4 cm in the anterior to posterior dimension, 0.8 cm in the superior to inferior dimension and 0.7 cm in the medial to lateral dimension. This corresponds to the biopsy-proven site of ADH with the coil-shaped metallic clip.   No other areas of suspicious enhancement or morphology are seen in the right breast. I see no evidence for  abnormal right breast skin, nipple or chest wall enhancement and there is no evidence for right axillary or internal mammary chain adenopathy.  In the anterior one-third of the lateral aspect of the left breast centered at the 3:30 position on the order of 4 cm from the nipple there is linear enhancement that measures on the order of 1.5 cm in the anterior to posterior dimension, 0.6 cm in the superior to inferior dimension and 0.7 cm in the medial to lateral dimension. No definite mammographic correlate is appreciated.   No areas of abnormal enhancement or morphology are otherwise seen in the left breast. I see no evidence for abnormal left breast skin, nipple or chest wall enhancement and there is no evidence for left axillary or internal mammary chain adenopathy.  BIRADS 4: Suspicious.      10/27/2023 Imaging    Additional MRI Images ([facility]):  Additional images were taken of the left breast pre and post intravenous administration of gadolinium to evaluate an area of linear enhancement and determine if the area showed features of a vessel.   Following additional acquisition of the images the area of linear enhancement does not show evidence of being a vessel. It is not reliably attached to a vessel or branching structure. Therefore, continued biopsy  of the area of linear enhancement in the left breast at the 3:30 position is recommended.  BI-RADS Category 4: Suspicious.     11/14/2023 Biopsy    Left Breast MR-Guided Biopsy & Right Breast Stereotactic Biopsy ([facility]):    1.  Left breast, 3:00, MRI-guided biopsies for linear enhancement:               -Intraductal papilloma, 2 mm in greatest extent, present as fragments in 2 of 19 total cores.    -No atypical hyperplasia, in situ nor invasive carcinoma identified.     2.  Right breast, 11:00, stereotactic biopsies for calcifications: INTERMEDIATE GRADE DUCTAL CARCINOMA IN SITU (DCIS).               -Architectural patterns: Solid and cribriform with central  necrosis and associated microcalcifications.               -DCIS is present in a single tissue core measuring up to 7 mm maximally.               -Additional findings include fibroadenomatoid hyperplasia.               -No evidence of in situ carcinoma identified.    ER+ (%, strong)  PA+ (%, strong)  Ki-67 12%         Review of Systems:  See interval history.      Medications:    Current Outpatient Medications:   •  aspirin 81 MG chewable tablet, Chew 1 tablet Daily., Disp: , Rfl:   •  atorvastatin (LIPITOR) 10 MG tablet, Take 1 tablet by mouth 3 (Three) Times a Week. NIGHT, Disp: , Rfl:   •  azelastine (ASTELIN) 0.1 % nasal spray, 1 spray As Needed., Disp: , Rfl:   •  benzonatate (TESSALON) 200 MG capsule, Take 1 capsule by mouth As Needed. (Patient not taking: Reported on 10/25/2023), Disp: , Rfl:   •  Cannabidiol 100 MG/ML solution, Take 1 mg by mouth Daily., Disp: , Rfl:   •  Cholecalciferol (Vitamin D3) 1.25 MG (79770 UT) tablet, Take 1 tablet by mouth 2 (Two) Times a Week. NIGHT ON Sunday AND THURSDAY, Disp: , Rfl:   •  dapagliflozin Propanediol (Farxiga) 10 MG tablet, 10 mg., Disp: , Rfl:   •  fluticasone (FLONASE) 50 MCG/ACT nasal spray, 1 spray into the nostril(s) as directed by provider As Needed. (Patient not taking: Reported on 10/25/2023), Disp: , Rfl:   •  lisinopril-hydrochlorothiazide (PRINZIDE,ZESTORETIC) 20-25 MG per tablet, Take 1 tablet by mouth Daily., Disp: , Rfl:   •  loratadine (CLARITIN) 10 MG tablet, Take 1 tablet by mouth Daily., Disp: , Rfl:   •  montelukast (SINGULAIR) 10 MG tablet, Take 1 tablet by mouth Every Night., Disp: , Rfl:   •  Multiple Vitamins-Minerals (Multi Adult Gummies) chewable tablet, Chew 1 tablet Daily., Disp: , Rfl:   •  Omega-3 Fatty Acids (OMEGA-3 FISH OIL PO), Take 800 mg by mouth Daily. (Patient not taking: Reported on 11/14/2023), Disp: , Rfl:   •  omeprazole (priLOSEC) 20 MG capsule, 1 capsule every night at bedtime., Disp: , Rfl:     Allergies:  No  Known Allergies    Family History   Problem Relation Age of Onset   • Irritable bowel syndrome Mother    • Heart disease Father    • Diabetes Father    • No Known Problems Sister    • Cancer Brother    • Diabetes Brother    • Esophageal cancer Brother    • No Known Problems Brother    • No Known Problems Brother    • No Known Problems Brother    • No Known Problems Brother    • Malig Hyperthermia Neg Hx        Objective  PHYSICAL EXAMINATION:   Vitals:    11/16/23 1020   BP: 142/78   Pulse: 76   SpO2: 97%     ECOG 0 - Asymptomatic  General: NAD, well appearing  Psych: a&o x3, normal mood and affect  Eyes: EOMI, no scleral icterus  ENMT: neck supple without masses or thyromegaly, mucous membranes moist  MSK: normal gait, normal ROM in bilateral shoulders  Lymph nodes: no cervical, supraclavicular or axillary lymphadenopathy  Breast: symmetric, very large size, pendulous  Right: No visible abnormalities on inspection while seated, with arms raised or hands on hips. No masses, skin changes, or nipple abnormalities.  Left: No visible abnormalities on inspection while seated, with arms raised or hands on hips. No masses, skin changes, or nipple abnormalities.    Bilateral breast, in-office ultrasound:   Left: At 5:00, 2 cm FN, there are postbiopsy changes seen about 8 mm deep to the skin.  Right: At 12:00, 8 cm FN, there is a linear biopsy tract seen about 2 cm deep to the skin.       I have independently reviewed her imaging and here are my findings:   In the right lateral breast, there initially was a 14 mm cluster of calcifications.  This represents the biopsy-proven site of ADH marked with a HydroMARK clip.  MRI shows 14 mm of non-mass enhancement with the clip located inferiorly.  In the right upper outer quadrant, there is about 20 mm of linear calcifications.  This represents biopsy-proven DCIS marked with a bowtie clip (7 mm on core).  Additionally in the right upper outer quadrant, there is a focal asymmetry  without an ultrasound correlate, which is probably benign.  I discussed this on the phone with Dr. Farrell today and he believes that this will be included with the calcifications in the partial mastectomy specimen.  In the left breast at 330 on MRI, there is 1.5 cm of linear enhancement.  This represents a biopsy-proven intraductal papilloma.      Assessment & Plan  Assessment:   1. 63 y.o. F with a new diagnosis of right breast ductal carcinoma in situ (DCIS), intermediate grade, ER/RI positive.  2.  She also has a diagnosis of right breast atypical ductal hyperplasia (ADH).  3.  She also has a diagnosis of a left breast intraductal papilloma.    Discussion:  I had an extensive discussion with the patient and her niece about the nature of her DCIS diagnosis and treatment options. We reviewed the components of breast tissue including ducts and lobules. We reviewed her pathology report in detail. We reviewed breast cancer histology, including stage, grade, receptors and how this applies to her diagnosis. We discussed the fact that we cannot accurately predict which patients with DCIS will progress to invasive cancer. We also discussed the fact that we cannot rule out current invasive cancer and that if there is invasive cancer found on final pathology, this would change the treatment plan.      We reviewed potential surgical treatments to include partial mastectomy versus mastectomy. We discussed the risks and benefits of both approaches. Regarding radiation therapy, we discussed that radiation is indicated in most cases of breast conservation and in only limited circumstances following mastectomy. I explained that the primary goal of adjuvant radiation is to decrease the likelihood of local recurrence. Regarding systemic therapy, we discussed that chemotherapy is not indicated in the treatment of DCIS. We briefly discussed the use of endocrine therapy to decrease the likelihood of a local recurrence or a second  primary tumor, but will refer her to medical oncology to discuss this postoperatively.    In her case, she is a good candidate for lumpectomy and she would like to proceed with breast conservation.  I explained that the ADH and DCIS sites will both need to be excised.  We also discussed the left breast intraductal papilloma.  This could be observed, but since she will be undergoing surgery anyway, we will also excise it.  I explained that lumpectomy would require preoperative wire-localization. We also discussed the risk of positive margins and that she must have negative margins for lumpectomy to be an appropriate oncologic procedure. I will make every effort to obtain negative margins at her initial operation, but there is a 10-15% chance that she will require a second operation for re-excision, or possibly a total mastectomy. We will not know the margin status until after her final pathology has returned.  She has large pendulous breasts and is a good candidate for oncoplastic reduction.  I will refer her to plastic surgery to discuss what this entails.    We discussed the role of bilateral mastectomy, which is to reduce the risk of a second primary breast cancer. I explained that most breast cancer is not hereditary, that I do not believe this plays a role in her case, and that she does not meet NCCN criteria for genetic testing. I would not recommend a bilateral mastectomy, since her risk of a second primary cancer is relatively low and endocrine therapy will further reduce her risk.    We discussed that axillary staging is usually not indicated for DCIS, but if invasive cancer is found on final pathology after lumpectomy, she would need a second operation for sentinel lymph node biopsy.    I described additional risks and potential complications associated with surgery, including, but not limited to, bleeding, infection, deformity/poor cosmetic result, chronic pain, numbness, seroma, hematoma, deep venous  thrombosis, skin flap necrosis, disease recurrence and the possibility of requiring additional surgery. We also discussed other treatment options including the option of not undergoing any surgical treatment and the risks associated with this including disease progression. She expressed an understanding of these factors and wished to proceed.    Plan:  -Plastic surgery referral.  -Right needle-localized partial mastectomy and right breast needle-localized excisional biopsy and left breast needle-localized excisional biopsy with bilateral oncoplastic closure.  -Refer to medical oncology and radiation oncology postoperatively.    Audelia Andino MD    I spent 65 minutes caring for Becca on this date of service. This time includes time spent by me in the following activities: preparing for the visit, obtaining and/or reviewing a separately obtained history, performing a medically appropriate examination and/or evaluation , referring and communicating with other health care professionals , documenting information in the medical record, and independently interpreting results and communicating that information with the patient/family/caregiver.      CC:  Nat Paez, APRN

## 2023-11-16 NOTE — PROGRESS NOTES
[facility]     Referring Provider: MARINO Deng     Chief complaint: Newly diagnosed right breast DCIS     Subjective   HPI:   10/12/23:  Ms. Becca Bansal is a 64 yo woman, seen at the request of MARINO Deng, for a new diagnosis of right breast atypical ductal hyperplasia.  She had not had a screening mammogram for 10 years, then in July of this year presented for a screening mammogram.  She was called back for multiple right breast findings.  Diagnostic imaging demonstrated 2 separate groups of calcifications and a right breast focal asymmetry without an ultrasound correlate.  She underwent biopsy of one of the groups of calcifications and this showed ADH.  The other group of calcifications and a focal asymmetry were placed in imaging surveillance.  Prior to this appointment, she underwent a breast MRI which showed an area of linear enhancement in the left breast. Her work-up is detailed in the breast history section below. Prior to the biopsy, she denies any breast lumps, pain, skin changes, or nipple discharge. She denies any prior history of abnormal mammograms or breast biopsies. She denies any family history of breast or ovarian cancer.     11/16/23, Interval History:  After her last visit, she underwent a right stereotactic biopsy which showed DCIS and a left MRI guided biopsy which showed an intraductal papilloma.  She returns today to discuss the results.  She was joined in clinic by her niece.       Oncology/Hematology History   Ductal carcinoma in situ (DCIS) of right breast   1/12/2023 Imaging    Screening MMG ([facility]):  The current examination reveals no new abnormalities or suspicious changes in appearance.  BI-RADS 2     7/10/2023 Initial Diagnosis    Ductal carcinoma in situ (DCIS) of right breast     7/10/2023 Imaging    Screening MMG with [facility] ( [facility]):   Right breast:  Focal asymmetry in the upper outer right breast middle 3rd  depth with associated   grouped calcifications.  More anteriorly in the upper outer right breast is a smaller group of   calcifications.  Left breast:  Development of several course benign type left breast calcifications.  There are   developing vascular and secretory type calcifications visualized.  No architectural distortion or   suspicious mass.  BI-RADS 0: Incomplete     8/14/2023 Imaging    Right Diagnostic MMG with [facility] & Right Breast US ([facility]):  MMG:  Right diagnostic mammogram:      There are subtle punctate and fine pleomorphic calcifications within the outer aspect of the right   breast which appears new from the prior examination.   There are punctate, round, and coarse heterogeneous calcifications which appear to be along a   linear distribution associated with a vessel.  These correspond to the upper outer aspect of the   right breast.  There is a subtle focal asymmetry within the upper-outer quadrant of the right breast on current   examination which appears fairly similar to the prior more remote examination from 2013 when   accounting for differences in technique.  US:  Sonographic grayscale and color Doppler images of the right breast were obtained with   representative examples submitted to PACs for interpretation.  Imaging at the 11 o'clock position   corresponding to the focal asymmetry within the upper-outer quadrant of the right breast   demonstrates no sonographic correlate.  There are incidental intramammary lymph nodes at the 11   o'clock position 13 cm from the nipple.   IMPRESSION:  1. Subtle punctate and fine pleomorphic calcifications within the outer aspect of the right breast   which appear new from prior examination.  Recommend right breast stereotactic biopsy for definitive   tissue diagnosis.  2. Additional calcifications appear linear in distribution and appear to correlate to a small   vessel.  These are considered probably benign and attention on follow-up right diagnostic  mammogram   6 months is recommended.  3. Right breast focal asymmetry appears like the more remote prior examination from 2013 when   accounting for differences in technique.  No sonographic correlate was identified.  This could also   be re-evaluated on the follow-up right diagnostic mammogram in 6 months.    BI-RADS 4: Suspicious     8/21/2023 Biopsy    Right Breast, Stereotactic Biopsy ( [facility]):  Right breast, outer, stereotactic-guided core biopsy:               - Atypical ductal hyperplasia (ADH) involving an intraductal papilloma  - Usual ductal hyperplasia (UDH)  - Columnar cell change  - Microcalcifications   Comment:  The above diagnosis was rendered in expert consultation by Mery Sarah MD , [facility] .  -Hydrocoil clip.     10/7/2023 Imaging    Bilateral Breast MRI ( [facility]):  In the middle third of the lateral aspect of the right breast at the 9 o'clock position centered on the order of 6.5 cm from the nipple there is a focal signal void seen at the inferior margin of non-mass enhancement that measures on the order of 1.4 cm in the anterior to posterior dimension, 0.8 cm in the superior to inferior dimension and 0.7 cm in the medial to lateral dimension. This corresponds to the biopsy-proven site of ADH with the coil-shaped metallic clip.   No other areas of suspicious enhancement or morphology are seen in the right breast. I see no evidence for abnormal right breast skin, nipple or chest wall enhancement and there is no evidence for right axillary or internal mammary chain adenopathy.  In the anterior one-third of the lateral aspect of the left breast centered at the 3:30 position on the order of 4 cm from the nipple there is linear enhancement that measures on the order of 1.5 cm in the anterior to posterior dimension, 0.6 cm in the superior to inferior dimension and 0.7 cm in the medial to lateral dimension. No definite mammographic correlate is appreciated.   No areas of abnormal  enhancement or morphology are otherwise seen in the left breast. I see no evidence for abnormal left breast skin, nipple or chest wall enhancement and there is no evidence for left axillary or internal mammary chain adenopathy.  BIRADS 4: Suspicious.      10/27/2023 Imaging    Additional MRI Images ([facility]):  Additional images were taken of the left breast pre and post intravenous administration of gadolinium to evaluate an area of linear enhancement and determine if the area showed features of a vessel.   Following additional acquisition of the images the area of linear enhancement does not show evidence of being a vessel. It is not reliably attached to a vessel or branching structure. Therefore, continued biopsy  of the area of linear enhancement in the left breast at the 3:30 position is recommended.  BI-RADS Category 4: Suspicious.     11/14/2023 Biopsy    Left Breast MR-Guided Biopsy & Right Breast Stereotactic Biopsy ([facility]):    1.  Left breast, 3:00, MRI-guided biopsies for linear enhancement:               -Intraductal papilloma, 2 mm in greatest extent, present as fragments in 2 of 19 total cores.    -No atypical hyperplasia, in situ nor invasive carcinoma identified.     2.  Right breast, 11:00, stereotactic biopsies for calcifications: INTERMEDIATE GRADE DUCTAL CARCINOMA IN SITU (DCIS).               -Architectural patterns: Solid and cribriform with central necrosis and associated microcalcifications.               -DCIS is present in a single tissue core measuring up to 7 mm maximally.               -Additional findings include fibroadenomatoid hyperplasia.               -No evidence of in situ carcinoma identified.    ER+ (%, strong)  VT+ (%, strong)  Ki-67 12%         Review of Systems:  See interval history.      Medications:    Current Outpatient Medications:     aspirin 81 MG chewable tablet, Chew 1 tablet Daily., Disp: , Rfl:     atorvastatin (LIPITOR) 10 MG tablet, Take 1 tablet by  mouth 3 (Three) Times a Week. NIGHT, Disp: , Rfl:     azelastine (ASTELIN) 0.1 % nasal spray, 1 spray As Needed., Disp: , Rfl:     benzonatate (TESSALON) 200 MG capsule, Take 1 capsule by mouth As Needed. (Patient not taking: Reported on 10/25/2023), Disp: , Rfl:     Cannabidiol 100 MG/ML solution, Take 1 mg by mouth Daily., Disp: , Rfl:     Cholecalciferol (Vitamin D3) 1.25 MG (65818 UT) tablet, Take 1 tablet by mouth 2 (Two) Times a Week. NIGHT ON Sunday AND THURSDAY, Disp: , Rfl:     dapagliflozin Propanediol (Farxiga) 10 MG tablet, 10 mg., Disp: , Rfl:     fluticasone (FLONASE) 50 MCG/ACT nasal spray, 1 spray into the nostril(s) as directed by provider As Needed. (Patient not taking: Reported on 10/25/2023), Disp: , Rfl:     lisinopril-hydrochlorothiazide (PRINZIDE,ZESTORETIC) 20-25 MG per tablet, Take 1 tablet by mouth Daily., Disp: , Rfl:     loratadine (CLARITIN) 10 MG tablet, Take 1 tablet by mouth Daily., Disp: , Rfl:     montelukast (SINGULAIR) 10 MG tablet, Take 1 tablet by mouth Every Night., Disp: , Rfl:     Multiple Vitamins-Minerals (Multi Adult Gummies) chewable tablet, Chew 1 tablet Daily., Disp: , Rfl:     Omega-3 Fatty Acids (OMEGA-3 FISH OIL PO), Take 800 mg by mouth Daily. (Patient not taking: Reported on 11/14/2023), Disp: , Rfl:     omeprazole (priLOSEC) 20 MG capsule, 1 capsule every night at bedtime., Disp: , Rfl:     Allergies:  No Known Allergies    Family History   Problem Relation Age of Onset    Irritable bowel syndrome Mother     Heart disease Father     Diabetes Father     No Known Problems Sister     Cancer Brother     Diabetes Brother     Esophageal cancer Brother     No Known Problems Brother     No Known Problems Brother     No Known Problems Brother     No Known Problems Brother     Malig Hyperthermia Neg Hx        Objective   PHYSICAL EXAMINATION:   Vitals:    11/16/23 1020   BP: 142/78   Pulse: 76   SpO2: 97%     ECOG 0 - Asymptomatic  General: NAD, well appearing  Psych: a&o  x3, normal mood and affect  Eyes: EOMI, no scleral icterus  ENMT: neck supple without masses or thyromegaly, mucous membranes moist  MSK: normal gait, normal ROM in bilateral shoulders  Lymph nodes: no cervical, supraclavicular or axillary lymphadenopathy  Breast: symmetric, very large size, pendulous  Right: No visible abnormalities on inspection while seated, with arms raised or hands on hips. No masses, skin changes, or nipple abnormalities.  Left: No visible abnormalities on inspection while seated, with arms raised or hands on hips. No masses, skin changes, or nipple abnormalities.    Bilateral breast, in-office ultrasound:   Left: At 5:00, 2 cm FN, there are postbiopsy changes seen about 8 mm deep to the skin.  Right: At 12:00, 8 cm FN, there is a linear biopsy tract seen about 2 cm deep to the skin.       I have independently reviewed her imaging and here are my findings:   In the right lateral breast, there initially was a 14 mm cluster of calcifications.  This represents the biopsy-proven site of ADH marked with a HydroMARK clip.  MRI shows 14 mm of non-mass enhancement with the clip located inferiorly.  In the right upper outer quadrant, there is about 20 mm of linear calcifications.  This represents biopsy-proven DCIS marked with a bowtie clip (7 mm on core).  Additionally in the right upper outer quadrant, there is a focal asymmetry without an ultrasound correlate, which is probably benign.  I discussed this on the phone with Dr. Farrell today and he believes that this will be included with the calcifications in the partial mastectomy specimen.  In the left breast at 330 on MRI, there is 1.5 cm of linear enhancement.  This represents a biopsy-proven intraductal papilloma.      Assessment & Plan   Assessment:   1. 63 y.o. F with a new diagnosis of right breast ductal carcinoma in situ (DCIS), intermediate grade, ER/CA positive.  2.  She also has a diagnosis of right breast atypical ductal hyperplasia  (ADH).  3.  She also has a diagnosis of a left breast intraductal papilloma.    Discussion:  I had an extensive discussion with the patient and her niece about the nature of her DCIS diagnosis and treatment options. We reviewed the components of breast tissue including ducts and lobules. We reviewed her pathology report in detail. We reviewed breast cancer histology, including stage, grade, receptors and how this applies to her diagnosis. We discussed the fact that we cannot accurately predict which patients with DCIS will progress to invasive cancer. We also discussed the fact that we cannot rule out current invasive cancer and that if there is invasive cancer found on final pathology, this would change the treatment plan.      We reviewed potential surgical treatments to include partial mastectomy versus mastectomy. We discussed the risks and benefits of both approaches. Regarding radiation therapy, we discussed that radiation is indicated in most cases of breast conservation and in only limited circumstances following mastectomy. I explained that the primary goal of adjuvant radiation is to decrease the likelihood of local recurrence. Regarding systemic therapy, we discussed that chemotherapy is not indicated in the treatment of DCIS. We briefly discussed the use of endocrine therapy to decrease the likelihood of a local recurrence or a second primary tumor, but will refer her to medical oncology to discuss this postoperatively.    In her case, she is a good candidate for lumpectomy and she would like to proceed with breast conservation.  I explained that the ADH and DCIS sites will both need to be excised.  We also discussed the left breast intraductal papilloma.  This could be observed, but since she will be undergoing surgery anyway, we will also excise it.  I explained that lumpectomy would require preoperative wire-localization. We also discussed the risk of positive margins and that she must have negative  margins for lumpectomy to be an appropriate oncologic procedure. I will make every effort to obtain negative margins at her initial operation, but there is a 10-15% chance that she will require a second operation for re-excision, or possibly a total mastectomy. We will not know the margin status until after her final pathology has returned.  She has large pendulous breasts and is a good candidate for oncoplastic reduction.  I will refer her to plastic surgery to discuss what this entails.    We discussed the role of bilateral mastectomy, which is to reduce the risk of a second primary breast cancer. I explained that most breast cancer is not hereditary, that I do not believe this plays a role in her case, and that she does not meet NCCN criteria for genetic testing. I would not recommend a bilateral mastectomy, since her risk of a second primary cancer is relatively low and endocrine therapy will further reduce her risk.    We discussed that axillary staging is usually not indicated for DCIS, but if invasive cancer is found on final pathology after lumpectomy, she would need a second operation for sentinel lymph node biopsy.    I described additional risks and potential complications associated with surgery, including, but not limited to, bleeding, infection, deformity/poor cosmetic result, chronic pain, numbness, seroma, hematoma, deep venous thrombosis, skin flap necrosis, disease recurrence and the possibility of requiring additional surgery. We also discussed other treatment options including the option of not undergoing any surgical treatment and the risks associated with this including disease progression. She expressed an understanding of these factors and wished to proceed.    Plan:  -Plastic surgery referral.  -Right needle-localized partial mastectomy and right breast needle-localized excisional biopsy and left breast needle-localized excisional biopsy with bilateral oncoplastic closure.  -Refer to medical  oncology and radiation oncology postoperatively.    Audelia Andino MD    I spent 65 minutes caring for Becca on this date of service. This time includes time spent by me in the following activities: preparing for the visit, obtaining and/or reviewing a separately obtained history, performing a medically appropriate examination and/or evaluation , referring and communicating with other health care professionals , documenting information in the medical record, and independently interpreting results and communicating that information with the patient/family/caregiver.      CC:  Nat Paez APRN

## 2023-11-17 ENCOUNTER — TELEPHONE (OUTPATIENT)
Dept: SURGERY | Facility: CLINIC | Age: 63
End: 2023-11-17
Payer: MEDICARE

## 2023-11-17 ENCOUNTER — TRANSCRIBE ORDERS (OUTPATIENT)
Dept: SURGERY | Facility: CLINIC | Age: 63
End: 2023-11-17
Payer: MEDICARE

## 2023-11-17 DIAGNOSIS — D05.11 DUCTAL CARCINOMA IN SITU (DCIS) OF RIGHT BREAST: Primary | ICD-10-CM

## 2023-11-17 DIAGNOSIS — D24.2 INTRADUCTAL PAPILLOMA OF LEFT BREAST: ICD-10-CM

## 2023-11-17 DIAGNOSIS — N60.91 ATYPICAL DUCTAL HYPERPLASIA OF RIGHT BREAST: ICD-10-CM

## 2023-11-17 NOTE — TELEPHONE ENCOUNTER
Called patient to give her an appointment with Dr. Berger  on 12/5/23 at 4:30 pm     Voicemail was left and request to call to confirm on Monday    Thank you  Shaina

## 2023-11-30 ENCOUNTER — TRANSCRIBE ORDERS (OUTPATIENT)
Dept: SURGERY | Facility: CLINIC | Age: 63
End: 2023-11-30
Payer: MEDICARE

## 2023-11-30 DIAGNOSIS — D05.11 DUCTAL CARCINOMA IN SITU (DCIS) OF RIGHT BREAST: Primary | ICD-10-CM

## 2023-12-01 ENCOUNTER — PATIENT OUTREACH (OUTPATIENT)
Dept: OTHER | Facility: HOSPITAL | Age: 63
End: 2023-12-01
Payer: MEDICARE

## 2023-12-12 ENCOUNTER — PRE-ADMISSION TESTING (OUTPATIENT)
Dept: PREADMISSION TESTING | Facility: HOSPITAL | Age: 63
End: 2023-12-12
Payer: MEDICARE

## 2023-12-12 VITALS
WEIGHT: 222.4 LBS | TEMPERATURE: 97.7 F | OXYGEN SATURATION: 96 % | HEART RATE: 99 BPM | DIASTOLIC BLOOD PRESSURE: 80 MMHG | SYSTOLIC BLOOD PRESSURE: 156 MMHG | BODY MASS INDEX: 39.41 KG/M2 | HEIGHT: 63 IN | RESPIRATION RATE: 16 BRPM

## 2023-12-12 DIAGNOSIS — D05.11 DUCTAL CARCINOMA IN SITU (DCIS) OF RIGHT BREAST: ICD-10-CM

## 2023-12-12 LAB
ANION GAP SERPL CALCULATED.3IONS-SCNC: 6 MMOL/L (ref 5–15)
BASOPHILS # BLD AUTO: 0.04 10*3/MM3 (ref 0–0.2)
BASOPHILS NFR BLD AUTO: 0.6 % (ref 0–1.5)
BUN SERPL-MCNC: 24 MG/DL (ref 8–23)
BUN/CREAT SERPL: 14.6 (ref 7–25)
CALCIUM SPEC-SCNC: 9.6 MG/DL (ref 8.6–10.5)
CHLORIDE SERPL-SCNC: 101 MMOL/L (ref 98–107)
CO2 SERPL-SCNC: 31 MMOL/L (ref 22–29)
CREAT SERPL-MCNC: 1.64 MG/DL (ref 0.57–1)
DEPRECATED RDW RBC AUTO: 41.9 FL (ref 37–54)
EGFRCR SERPLBLD CKD-EPI 2021: 34.6 ML/MIN/1.73
EOSINOPHIL # BLD AUTO: 0.22 10*3/MM3 (ref 0–0.4)
EOSINOPHIL NFR BLD AUTO: 3.2 % (ref 0.3–6.2)
ERYTHROCYTE [DISTWIDTH] IN BLOOD BY AUTOMATED COUNT: 14.1 % (ref 12.3–15.4)
GLUCOSE SERPL-MCNC: 169 MG/DL (ref 65–99)
HCT VFR BLD AUTO: 39.8 % (ref 34–46.6)
HGB BLD-MCNC: 12.3 G/DL (ref 12–15.9)
IMM GRANULOCYTES # BLD AUTO: 0.03 10*3/MM3 (ref 0–0.05)
IMM GRANULOCYTES NFR BLD AUTO: 0.4 % (ref 0–0.5)
LYMPHOCYTES # BLD AUTO: 1.64 10*3/MM3 (ref 0.7–3.1)
LYMPHOCYTES NFR BLD AUTO: 24.2 % (ref 19.6–45.3)
MCH RBC QN AUTO: 25.3 PG (ref 26.6–33)
MCHC RBC AUTO-ENTMCNC: 30.9 G/DL (ref 31.5–35.7)
MCV RBC AUTO: 81.7 FL (ref 79–97)
MONOCYTES # BLD AUTO: 0.51 10*3/MM3 (ref 0.1–0.9)
MONOCYTES NFR BLD AUTO: 7.5 % (ref 5–12)
NEUTROPHILS NFR BLD AUTO: 4.35 10*3/MM3 (ref 1.7–7)
NEUTROPHILS NFR BLD AUTO: 64.1 % (ref 42.7–76)
NRBC BLD AUTO-RTO: 0 /100 WBC (ref 0–0.2)
PLATELET # BLD AUTO: 278 10*3/MM3 (ref 140–450)
PMV BLD AUTO: 9.1 FL (ref 6–12)
POTASSIUM SERPL-SCNC: 4.7 MMOL/L (ref 3.5–5.2)
RBC # BLD AUTO: 4.87 10*6/MM3 (ref 3.77–5.28)
SODIUM SERPL-SCNC: 138 MMOL/L (ref 136–145)
WBC NRBC COR # BLD AUTO: 6.79 10*3/MM3 (ref 3.4–10.8)

## 2023-12-12 PROCEDURE — 93005 ELECTROCARDIOGRAM TRACING: CPT

## 2023-12-12 PROCEDURE — 80048 BASIC METABOLIC PNL TOTAL CA: CPT

## 2023-12-12 PROCEDURE — 36415 COLL VENOUS BLD VENIPUNCTURE: CPT

## 2023-12-12 PROCEDURE — 85025 COMPLETE CBC W/AUTO DIFF WBC: CPT

## 2023-12-12 NOTE — DISCHARGE INSTRUCTIONS
Take the following medications the morning of surgery: NONE    TIME OF ARRIVAL: 7:30 AM    If you are on prescription narcotic pain medication to control your pain you may also take that medication the morning of surgery.    General Instructions:  Do not eat solid food after midnight the night before surgery.  You may drink clear liquids day of surgery but must stop at least one hour before your hospital arrival time.  It is beneficial for you to have a clear drink that contains carbohydrates the day of surgery.  We suggest a 12 to 20 ounce bottle of Gatorade or Powerade for non-diabetic patients or a 12 to 20 ounce bottle of G2 or Powerade Zero for diabetic patients. (Pediatric patients, are not advised to drink a 12 to 20 ounce carbohydrate drink)    Clear liquids are liquids you can see through.  Nothing red in color.     Plain water                               Sports drinks  Sodas                                   Gelatin (Jell-O)  Fruit juices without pulp such as white grape juice and apple juice  Popsicles that contain no fruit or yogurt  Tea or coffee (no cream or milk added)  Gatorade / Powerade  G2 / Powerade Zero    Patients who avoid smoking, chewing tobacco and alcohol for 4 weeks prior to surgery have a reduced risk of post-operative complications.  Quit smoking as many days before surgery as you can.  Do not smoke, use chewing tobacco or drink alcohol the day of surgery.   If applicable bring your C-PAP/ BI-PAP machine in with you to preop day of surgery.  Bring any papers given to you in the doctor’s office.  Wear clean comfortable clothes.  Do not wear contact lenses, false eyelashes or make-up.  Bring a case for your glasses.   Bring crutches or walker if applicable.  Remove all piercings.  Leave jewelry and any other valuables at home.  Hair extensions with metal clips must be removed prior to surgery.  The Pre-Admission Testing nurse will instruct you to bring medications if unable to obtain an  accurate list in Pre-Admission Testing.          Preventing a Surgical Site Infection:  For 2 to 3 days before surgery, avoid shaving with a razor because the razor can irritate skin and make it easier to develop an infection.    Any areas of open skin can increase the risk of a post-operative wound infection by allowing bacteria to enter and travel throughout the body.  Notify your surgeon if you have any skin wounds / rashes even if it is not near the expected surgical site.  The area will need assessed to determine if surgery should be delayed until it is healed.  The night prior to surgery shower using a fresh bar of anti-bacterial soap (such as Dial) and clean washcloth.  Sleep in a clean bed with clean clothing.  Do not allow pets to sleep with you.  Shower on the morning of surgery using a fresh bar of anti-bacterial soap (such as Dial) and clean washcloth.  Dry with a clean towel and dress in clean clothing.  Ask your surgeon if you will be receiving antibiotics prior to surgery.  Make sure you, your family, and all healthcare providers clean their hands with soap and water or an alcohol based hand  before caring for you or your wound.    Day of surgery:  Your arrival time is approximately two hours before your scheduled surgery time.  Upon arrival, a Pre-op nurse and Anesthesiologist will review your health history, obtain vital signs, and answer questions you may have.  The only belongings needed at this time will be a list of your home medications and if applicable your C-PAP/BI-PAP machine.  A Pre-op nurse will start an IV and you may receive medication in preparation for surgery, including something to help you relax.     Please be aware that surgery does come with discomfort.  We want to make every effort to control your discomfort so please discuss any uncontrolled symptoms with your nurse.   Your doctor will most likely have prescribed pain medications.      If you are going home after  surgery you will receive individualized written care instructions before being discharged.  A responsible adult must drive you to and from the hospital on the day of your surgery and stay with you for 24 hours.  Discharge prescriptions can be filled by the hospital pharmacy during regular pharmacy hours.  If you are having surgery late in the day/evening your prescription may be e-prescribed to your pharmacy.  Please verify your pharmacy hours or chose a 24 hour pharmacy to avoid not having access to your prescription because your pharmacy has closed for the day.    If you are staying overnight following surgery, you will be transported to your hospital room following the recovery period.  [facility] has all private rooms.    If you have any questions please call Pre-Admission Testing at [phone].  Deductibles and co-payments are collected on the day of service. Please be prepared to pay the required co-pay, deductible or deposit on the day of service as defined by your plan.    Call your surgeon immediately if you experience any of the following symptoms:  Sore Throat  Shortness of Breath or difficulty breathing  Cough  Chills  Body soreness or muscle pain  Headache  Fever  New loss of taste or smell  Do not arrive for your surgery ill.  Your procedure will need to be rescheduled to another time.  You will need to call your physician before the day of surgery to avoid any unnecessary exposure to hospital staff as well as other patients.

## 2023-12-13 ENCOUNTER — PATIENT OUTREACH (OUTPATIENT)
Dept: OTHER | Facility: HOSPITAL | Age: 63
End: 2023-12-13
Payer: MEDICARE

## 2023-12-13 LAB
QT INTERVAL: 352 MS
QTC INTERVAL: 401 MS

## 2023-12-13 NOTE — PROGRESS NOTES
Nurse Initial Navigator Assessment:  Received referral from Dr. Andino to follow patient regarding diagnosis and psychosocial support. Call placed to patient to complete initial assessment. Introduced self and explained role. 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 Patient met with Dr. Andino and Dr. Berger to discuss plan of care and treatment plan. She is scheduled for right needle-localized partial mastectomy; right breast needle-localized excisional biopsy and left breast needle-localized excisional biopsy with bilateral oncoplastic closure on 12/19/23. Referrals to medical and radiation oncology postoperatively. Patient able to verbalize understanding regarding plan of care and treatment plan at this time. No additional concerns or questions voiced.    Patient lives alone. States she has a good support system. Denies difficulty with affording medications or transportation at this time. Patient IADLs and mobility. No complaints voiced at this time. No concerns voiced or psychosocial needs noted at this time.   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 N/A  Social History  Support Community: Yes  Substance Use: smoking---never; ETOH--no; Drugs---no   Family Psychiatric: Unknown   Impactful cancer experience: brother---esophageal cancer and possible lung  cancer  Plan of Care  Discussed [facility] offered [facility] and in the community. Patient does not have an advanced directive. Provided navigation letter; advanced care planning; Cancer Care Supportive Services; Living Beyond Breast Cancer; [employer] [facility] [employer]; [employer] and online resources. No additional needs noted at this time. Encouraged patient to call with any questions or concerns. Will continue to follow…Daisy Cid RN, Oncology Nurse Navigator

## 2023-12-19 ENCOUNTER — APPOINTMENT (OUTPATIENT)
Dept: GENERAL RADIOLOGY | Facility: HOSPITAL | Age: 63
End: 2023-12-19
Payer: MEDICARE

## 2023-12-19 ENCOUNTER — HOSPITAL ENCOUNTER (OUTPATIENT)
Facility: HOSPITAL | Age: 63
Setting detail: OBSERVATION
Discharge: HOME | End: 2023-12-20
Attending: SURGERY | Admitting: PLASTIC SURGERY
Payer: MEDICARE

## 2023-12-19 ENCOUNTER — ANESTHESIA (OUTPATIENT)
Dept: PERIOP | Facility: HOSPITAL | Age: 63
End: 2023-12-19
Payer: MEDICARE

## 2023-12-19 ENCOUNTER — HOSPITAL ENCOUNTER (OUTPATIENT)
Dept: MAMMOGRAPHY | Facility: HOSPITAL | Age: 63
Discharge: HOME | End: 2023-12-19
Payer: MEDICARE

## 2023-12-19 ENCOUNTER — ANESTHESIA EVENT (OUTPATIENT)
Dept: PERIOP | Facility: HOSPITAL | Age: 63
End: 2023-12-19
Payer: MEDICARE

## 2023-12-19 ENCOUNTER — ANCILLARY PROCEDURE (OUTPATIENT)
Dept: LAB | Facility: HOSPITAL | Age: 63
End: 2023-12-19
Payer: MEDICARE

## 2023-12-19 ENCOUNTER — TRANSCRIBE ORDERS (OUTPATIENT)
Dept: LAB | Facility: HOSPITAL | Age: 63
End: 2023-12-19
Payer: MEDICARE

## 2023-12-19 DIAGNOSIS — D24.2 INTRADUCTAL PAPILLOMA OF LEFT BREAST: ICD-10-CM

## 2023-12-19 DIAGNOSIS — N60.91 ATYPICAL DUCTAL HYPERPLASIA OF RIGHT BREAST: ICD-10-CM

## 2023-12-19 DIAGNOSIS — D05.11 DUCTAL CARCINOMA IN SITU (DCIS) OF RIGHT BREAST: ICD-10-CM

## 2023-12-19 DIAGNOSIS — Z98.890 S/P BILATERAL BREAST BIOPSY: ICD-10-CM

## 2023-12-19 DIAGNOSIS — Z98.890 S/P BILATERAL BREAST BIOPSY: Primary | ICD-10-CM

## 2023-12-19 PROBLEM — C50.919 BREAST CANCER: Status: ACTIVE | Noted: 2023-12-19

## 2023-12-19 LAB
GLUCOSE BLDC GLUCOMTR-MCNC: 112 MG/DL (ref 70–130)
GLUCOSE BLDC GLUCOMTR-MCNC: 112 MG/DL (ref 70–130)
LAB AP CASE REPORT: NORMAL
LAB AP CASE REPORT: NORMAL
LAB AP DIAGNOSIS COMMENT: NORMAL
LAB AP DIAGNOSIS COMMENT: NORMAL
LAB AP SPECIAL STAINS: NORMAL
LAB AP SPECIAL STAINS: NORMAL
LAB AP SYNOPTIC CHECKLIST: NORMAL
LAB AP SYNOPTIC CHECKLIST: NORMAL
PATH REPORT.FINAL DX SPEC: NORMAL
PATH REPORT.FINAL DX SPEC: NORMAL
PATH REPORT.GROSS SPEC: NORMAL
PATH REPORT.GROSS SPEC: NORMAL

## 2023-12-19 PROCEDURE — 88360 TUMOR IMMUNOHISTOCHEM/MANUAL: CPT | Performed by: SURGERY

## 2023-12-19 PROCEDURE — G0378 HOSPITAL OBSERVATION PER HR: HCPCS

## 2023-12-19 PROCEDURE — 25010000002 DEXAMETHASONE PER 1 MG: Performed by: NURSE ANESTHETIST, CERTIFIED REGISTERED

## 2023-12-19 PROCEDURE — 76098 X-RAY EXAM SURGICAL SPECIMEN: CPT

## 2023-12-19 PROCEDURE — 19125 EXCISION BREAST LESION: CPT | Performed by: SURGERY

## 2023-12-19 PROCEDURE — 88342 IMHCHEM/IMCYTCHM 1ST ANTB: CPT | Performed by: SURGERY

## 2023-12-19 PROCEDURE — C1819 TISSUE LOCALIZATION-EXCISION: HCPCS

## 2023-12-19 PROCEDURE — 25010000002 FENTANYL CITRATE (PF) 50 MCG/ML SOLUTION: Performed by: NURSE ANESTHETIST, CERTIFIED REGISTERED

## 2023-12-19 PROCEDURE — 88305 TISSUE EXAM BY PATHOLOGIST: CPT | Performed by: PLASTIC SURGERY

## 2023-12-19 PROCEDURE — S0260 H&P FOR SURGERY: HCPCS | Performed by: SURGERY

## 2023-12-19 PROCEDURE — 25010000002 SUGAMMADEX 200 MG/2ML SOLUTION: Performed by: ANESTHESIOLOGY

## 2023-12-19 PROCEDURE — 25010000002 LIDOCAINE 1 % SOLUTION: Performed by: SURGERY

## 2023-12-19 PROCEDURE — 19301 PARTIAL MASTECTOMY: CPT | Performed by: SURGERY

## 2023-12-19 PROCEDURE — 25010000002 CEFAZOLIN IN DEXTROSE 2-4 GM/100ML-% SOLUTION: Performed by: SURGERY

## 2023-12-19 PROCEDURE — 88307 TISSUE EXAM BY PATHOLOGIST: CPT | Performed by: SURGERY

## 2023-12-19 PROCEDURE — 63710000001 ONDANSETRON PER 8 MG: Performed by: SURGERY

## 2023-12-19 PROCEDURE — 25010000002 HYDROMORPHONE PER 4 MG: Performed by: NURSE ANESTHETIST, CERTIFIED REGISTERED

## 2023-12-19 PROCEDURE — 25810000003 LACTATED RINGERS PER 1000 ML: Performed by: ANESTHESIOLOGY

## 2023-12-19 PROCEDURE — 25010000002 ROPIVACAINE PER 1 MG: Performed by: SURGERY

## 2023-12-19 PROCEDURE — 25010000002 CEFAZOLIN PER 500 MG: Performed by: PLASTIC SURGERY

## 2023-12-19 PROCEDURE — 25010000002 ONDANSETRON PER 1 MG: Performed by: ANESTHESIOLOGY

## 2023-12-19 PROCEDURE — 25010000002 GENTAMICIN PER 80 MG: Performed by: PLASTIC SURGERY

## 2023-12-19 PROCEDURE — 82948 REAGENT STRIP/BLOOD GLUCOSE: CPT

## 2023-12-19 PROCEDURE — 19126 EXCISION ADDL BREAST LESION: CPT | Performed by: SURGERY

## 2023-12-19 PROCEDURE — 25010000002 PROPOFOL 10 MG/ML EMULSION: Performed by: NURSE ANESTHETIST, CERTIFIED REGISTERED

## 2023-12-19 DEVICE — LIGACLIP MCA MULTIPLE CLIP APPLIERS, 20 SMALL CLIPS
Type: IMPLANTABLE DEVICE | Site: BREAST | Status: FUNCTIONAL
Brand: LIGACLIP

## 2023-12-19 DEVICE — KNOTLESS TISSUE CONTROL DEVICE, UNDYED UNIDIRECTIONAL (ANTIBACTERIAL) SYNTHETIC ABSORBABLE DEVICE
Type: IMPLANTABLE DEVICE | Site: BREAST | Status: FUNCTIONAL
Brand: STRATAFIX

## 2023-12-19 RX ORDER — HYDROCODONE BITARTRATE AND ACETAMINOPHEN 7.5; 325 MG/1; MG/1
1 TABLET ORAL EVERY 4 HOURS PRN
Status: DISCONTINUED | OUTPATIENT
Start: 2023-12-19 | End: 2023-12-19 | Stop reason: HOSPADM

## 2023-12-19 RX ORDER — EPHEDRINE SULFATE 50 MG/ML
5 INJECTION, SOLUTION INTRAVENOUS ONCE AS NEEDED
Status: DISCONTINUED | OUTPATIENT
Start: 2023-12-19 | End: 2023-12-19 | Stop reason: HOSPADM

## 2023-12-19 RX ORDER — LIDOCAINE HYDROCHLORIDE 10 MG/ML
6 INJECTION, SOLUTION INFILTRATION; PERINEURAL ONCE
Status: COMPLETED | OUTPATIENT
Start: 2023-12-19 | End: 2023-12-19

## 2023-12-19 RX ORDER — GABAPENTIN 100 MG/1
100 CAPSULE ORAL EVERY 8 HOURS SCHEDULED
Status: DISCONTINUED | OUTPATIENT
Start: 2023-12-19 | End: 2023-12-20 | Stop reason: HOSPADM

## 2023-12-19 RX ORDER — HYDROMORPHONE HYDROCHLORIDE 1 MG/ML
0.25 INJECTION, SOLUTION INTRAMUSCULAR; INTRAVENOUS; SUBCUTANEOUS
Status: DISCONTINUED | OUTPATIENT
Start: 2023-12-19 | End: 2023-12-19 | Stop reason: HOSPADM

## 2023-12-19 RX ORDER — DROPERIDOL 2.5 MG/ML
0.62 INJECTION, SOLUTION INTRAMUSCULAR; INTRAVENOUS
Status: DISCONTINUED | OUTPATIENT
Start: 2023-12-19 | End: 2023-12-19 | Stop reason: HOSPADM

## 2023-12-19 RX ORDER — MIDAZOLAM HYDROCHLORIDE 1 MG/ML
1 INJECTION INTRAMUSCULAR; INTRAVENOUS
Status: DISCONTINUED | OUTPATIENT
Start: 2023-12-19 | End: 2023-12-19 | Stop reason: HOSPADM

## 2023-12-19 RX ORDER — ROPIVACAINE HYDROCHLORIDE 5 MG/ML
INJECTION, SOLUTION EPIDURAL; INFILTRATION; PERINEURAL AS NEEDED
Status: DISCONTINUED | OUTPATIENT
Start: 2023-12-19 | End: 2023-12-19 | Stop reason: HOSPADM

## 2023-12-19 RX ORDER — ONDANSETRON 2 MG/ML
4 INJECTION INTRAMUSCULAR; INTRAVENOUS ONCE AS NEEDED
Status: DISCONTINUED | OUTPATIENT
Start: 2023-12-19 | End: 2023-12-19 | Stop reason: HOSPADM

## 2023-12-19 RX ORDER — LIDOCAINE HYDROCHLORIDE 10 MG/ML
3 INJECTION, SOLUTION INFILTRATION; PERINEURAL ONCE
Status: COMPLETED | OUTPATIENT
Start: 2023-12-19 | End: 2023-12-19

## 2023-12-19 RX ORDER — OXYCODONE HYDROCHLORIDE 5 MG/1
10 TABLET ORAL ONCE
Status: COMPLETED | OUTPATIENT
Start: 2023-12-19 | End: 2023-12-19

## 2023-12-19 RX ORDER — SODIUM CHLORIDE, SODIUM LACTATE, POTASSIUM CHLORIDE, CALCIUM CHLORIDE 600; 310; 30; 20 MG/100ML; MG/100ML; MG/100ML; MG/100ML
125 INJECTION, SOLUTION INTRAVENOUS CONTINUOUS
Status: DISCONTINUED | OUTPATIENT
Start: 2023-12-19 | End: 2023-12-19

## 2023-12-19 RX ORDER — FLUMAZENIL 0.1 MG/ML
0.2 INJECTION INTRAVENOUS AS NEEDED
Status: DISCONTINUED | OUTPATIENT
Start: 2023-12-19 | End: 2023-12-19 | Stop reason: HOSPADM

## 2023-12-19 RX ORDER — SODIUM CHLORIDE 9 MG/ML
40 INJECTION, SOLUTION INTRAVENOUS AS NEEDED
Status: DISCONTINUED | OUTPATIENT
Start: 2023-12-19 | End: 2023-12-20 | Stop reason: HOSPADM

## 2023-12-19 RX ORDER — ROCURONIUM BROMIDE 10 MG/ML
INJECTION, SOLUTION INTRAVENOUS AS NEEDED
Status: DISCONTINUED | OUTPATIENT
Start: 2023-12-19 | End: 2023-12-19 | Stop reason: SURG

## 2023-12-19 RX ORDER — DIPHENHYDRAMINE HYDROCHLORIDE 50 MG/ML
12.5 INJECTION INTRAMUSCULAR; INTRAVENOUS
Status: DISCONTINUED | OUTPATIENT
Start: 2023-12-19 | End: 2023-12-19 | Stop reason: HOSPADM

## 2023-12-19 RX ORDER — ACETAMINOPHEN 500 MG
1000 TABLET ORAL ONCE
Status: COMPLETED | OUTPATIENT
Start: 2023-12-19 | End: 2023-12-19

## 2023-12-19 RX ORDER — TRANEXAMIC ACID 100 MG/ML
INJECTION, SOLUTION INTRAVENOUS AS NEEDED
Status: DISCONTINUED | OUTPATIENT
Start: 2023-12-19 | End: 2023-12-19 | Stop reason: HOSPADM

## 2023-12-19 RX ORDER — LIDOCAINE HYDROCHLORIDE 10 MG/ML
0.5 INJECTION, SOLUTION INFILTRATION; PERINEURAL ONCE AS NEEDED
Status: DISCONTINUED | OUTPATIENT
Start: 2023-12-19 | End: 2023-12-19 | Stop reason: HOSPADM

## 2023-12-19 RX ORDER — ONDANSETRON HYDROCHLORIDE 8 MG/1
8 TABLET, FILM COATED ORAL ONCE
Status: COMPLETED | OUTPATIENT
Start: 2023-12-19 | End: 2023-12-19

## 2023-12-19 RX ORDER — HYDROCODONE BITARTRATE AND ACETAMINOPHEN 5; 325 MG/1; MG/1
1 TABLET ORAL ONCE AS NEEDED
Status: DISCONTINUED | OUTPATIENT
Start: 2023-12-19 | End: 2023-12-19 | Stop reason: HOSPADM

## 2023-12-19 RX ORDER — CELECOXIB 200 MG/1
400 CAPSULE ORAL ONCE
Status: COMPLETED | OUTPATIENT
Start: 2023-12-19 | End: 2023-12-19

## 2023-12-19 RX ORDER — MAGNESIUM HYDROXIDE 1200 MG/15ML
LIQUID ORAL AS NEEDED
Status: DISCONTINUED | OUTPATIENT
Start: 2023-12-19 | End: 2023-12-19 | Stop reason: HOSPADM

## 2023-12-19 RX ORDER — SODIUM CHLORIDE, SODIUM LACTATE, POTASSIUM CHLORIDE, CALCIUM CHLORIDE 600; 310; 30; 20 MG/100ML; MG/100ML; MG/100ML; MG/100ML
9 INJECTION, SOLUTION INTRAVENOUS CONTINUOUS
Status: DISCONTINUED | OUTPATIENT
Start: 2023-12-19 | End: 2023-12-19

## 2023-12-19 RX ORDER — PROMETHAZINE HYDROCHLORIDE 25 MG/1
25 SUPPOSITORY RECTAL ONCE AS NEEDED
Status: DISCONTINUED | OUTPATIENT
Start: 2023-12-19 | End: 2023-12-19 | Stop reason: HOSPADM

## 2023-12-19 RX ORDER — FENTANYL CITRATE 50 UG/ML
50 INJECTION, SOLUTION INTRAMUSCULAR; INTRAVENOUS ONCE AS NEEDED
Status: DISCONTINUED | OUTPATIENT
Start: 2023-12-19 | End: 2023-12-19 | Stop reason: HOSPADM

## 2023-12-19 RX ORDER — MIDAZOLAM HYDROCHLORIDE 1 MG/ML
0.5 INJECTION INTRAMUSCULAR; INTRAVENOUS
Status: DISCONTINUED | OUTPATIENT
Start: 2023-12-19 | End: 2023-12-19 | Stop reason: HOSPADM

## 2023-12-19 RX ORDER — FENTANYL CITRATE 50 UG/ML
INJECTION, SOLUTION INTRAMUSCULAR; INTRAVENOUS AS NEEDED
Status: DISCONTINUED | OUTPATIENT
Start: 2023-12-19 | End: 2023-12-19 | Stop reason: SURG

## 2023-12-19 RX ORDER — OXYCODONE HYDROCHLORIDE 5 MG/1
5 TABLET ORAL EVERY 4 HOURS PRN
Status: DISCONTINUED | OUTPATIENT
Start: 2023-12-19 | End: 2023-12-20 | Stop reason: HOSPADM

## 2023-12-19 RX ORDER — HYDROMORPHONE HYDROCHLORIDE 1 MG/ML
0.25 INJECTION, SOLUTION INTRAMUSCULAR; INTRAVENOUS; SUBCUTANEOUS
Status: DISCONTINUED | OUTPATIENT
Start: 2023-12-19 | End: 2023-12-20 | Stop reason: HOSPADM

## 2023-12-19 RX ORDER — LIDOCAINE HYDROCHLORIDE 20 MG/ML
INJECTION, SOLUTION INFILTRATION; PERINEURAL AS NEEDED
Status: DISCONTINUED | OUTPATIENT
Start: 2023-12-19 | End: 2023-12-19 | Stop reason: SURG

## 2023-12-19 RX ORDER — PROPOFOL 10 MG/ML
VIAL (ML) INTRAVENOUS AS NEEDED
Status: DISCONTINUED | OUTPATIENT
Start: 2023-12-19 | End: 2023-12-19 | Stop reason: SURG

## 2023-12-19 RX ORDER — ONDANSETRON 2 MG/ML
INJECTION INTRAMUSCULAR; INTRAVENOUS AS NEEDED
Status: DISCONTINUED | OUTPATIENT
Start: 2023-12-19 | End: 2023-12-19 | Stop reason: SURG

## 2023-12-19 RX ORDER — ONDANSETRON 8 MG/1
8 TABLET, ORALLY DISINTEGRATING ORAL EVERY 6 HOURS PRN
Status: DISCONTINUED | OUTPATIENT
Start: 2023-12-19 | End: 2023-12-20 | Stop reason: HOSPADM

## 2023-12-19 RX ORDER — PROMETHAZINE HYDROCHLORIDE 25 MG/1
25 TABLET ORAL ONCE AS NEEDED
Status: DISCONTINUED | OUTPATIENT
Start: 2023-12-19 | End: 2023-12-19 | Stop reason: HOSPADM

## 2023-12-19 RX ORDER — FAMOTIDINE 10 MG/ML
20 INJECTION, SOLUTION INTRAVENOUS ONCE
Status: COMPLETED | OUTPATIENT
Start: 2023-12-19 | End: 2023-12-19

## 2023-12-19 RX ORDER — LABETALOL HYDROCHLORIDE 5 MG/ML
5 INJECTION, SOLUTION INTRAVENOUS
Status: DISCONTINUED | OUTPATIENT
Start: 2023-12-19 | End: 2023-12-19 | Stop reason: HOSPADM

## 2023-12-19 RX ORDER — SODIUM CHLORIDE 0.9 % (FLUSH) 0.9 %
3 SYRINGE (ML) INJECTION EVERY 12 HOURS SCHEDULED
Status: DISCONTINUED | OUTPATIENT
Start: 2023-12-19 | End: 2023-12-20 | Stop reason: HOSPADM

## 2023-12-19 RX ORDER — NALOXONE HCL 0.4 MG/ML
0.2 VIAL (ML) INJECTION AS NEEDED
Status: DISCONTINUED | OUTPATIENT
Start: 2023-12-19 | End: 2023-12-19 | Stop reason: HOSPADM

## 2023-12-19 RX ORDER — DEXAMETHASONE SODIUM PHOSPHATE 4 MG/ML
INJECTION, SOLUTION INTRA-ARTICULAR; INTRALESIONAL; INTRAMUSCULAR; INTRAVENOUS; SOFT TISSUE AS NEEDED
Status: DISCONTINUED | OUTPATIENT
Start: 2023-12-19 | End: 2023-12-19 | Stop reason: SURG

## 2023-12-19 RX ORDER — NALOXONE HCL 0.4 MG/ML
0.1 VIAL (ML) INJECTION
Status: DISCONTINUED | OUTPATIENT
Start: 2023-12-19 | End: 2023-12-20 | Stop reason: HOSPADM

## 2023-12-19 RX ORDER — ONDANSETRON 2 MG/ML
4 INJECTION INTRAMUSCULAR; INTRAVENOUS EVERY 6 HOURS PRN
Status: DISCONTINUED | OUTPATIENT
Start: 2023-12-19 | End: 2023-12-20 | Stop reason: HOSPADM

## 2023-12-19 RX ORDER — GABAPENTIN 300 MG/1
300 CAPSULE ORAL ONCE
Status: COMPLETED | OUTPATIENT
Start: 2023-12-19 | End: 2023-12-19

## 2023-12-19 RX ORDER — ATORVASTATIN CALCIUM 20 MG/1
10 TABLET, FILM COATED ORAL NIGHTLY
Status: DISCONTINUED | OUTPATIENT
Start: 2023-12-19 | End: 2023-12-20 | Stop reason: HOSPADM

## 2023-12-19 RX ORDER — SODIUM CHLORIDE 0.9 % (FLUSH) 0.9 %
3-10 SYRINGE (ML) INJECTION AS NEEDED
Status: DISCONTINUED | OUTPATIENT
Start: 2023-12-19 | End: 2023-12-20 | Stop reason: HOSPADM

## 2023-12-19 RX ORDER — ONDANSETRON 4 MG/1
4 TABLET, ORALLY DISINTEGRATING ORAL EVERY 6 HOURS PRN
Status: DISCONTINUED | OUTPATIENT
Start: 2023-12-19 | End: 2023-12-20 | Stop reason: HOSPADM

## 2023-12-19 RX ORDER — IPRATROPIUM BROMIDE AND ALBUTEROL SULFATE 2.5; .5 MG/3ML; MG/3ML
3 SOLUTION RESPIRATORY (INHALATION) ONCE AS NEEDED
Status: DISCONTINUED | OUTPATIENT
Start: 2023-12-19 | End: 2023-12-19 | Stop reason: HOSPADM

## 2023-12-19 RX ORDER — HYDRALAZINE HYDROCHLORIDE 20 MG/ML
5 INJECTION INTRAMUSCULAR; INTRAVENOUS
Status: DISCONTINUED | OUTPATIENT
Start: 2023-12-19 | End: 2023-12-19 | Stop reason: HOSPADM

## 2023-12-19 RX ORDER — CEFAZOLIN SODIUM 2 G/100ML
2000 INJECTION, SOLUTION INTRAVENOUS ONCE
Status: COMPLETED | OUTPATIENT
Start: 2023-12-19 | End: 2023-12-19

## 2023-12-19 RX ORDER — ACETAMINOPHEN 325 MG/1
650 TABLET ORAL EVERY 4 HOURS PRN
Status: DISCONTINUED | OUTPATIENT
Start: 2023-12-19 | End: 2023-12-20 | Stop reason: HOSPADM

## 2023-12-19 RX ORDER — SODIUM CHLORIDE 0.9 % (FLUSH) 0.9 %
3-10 SYRINGE (ML) INJECTION AS NEEDED
Status: DISCONTINUED | OUTPATIENT
Start: 2023-12-19 | End: 2023-12-19 | Stop reason: HOSPADM

## 2023-12-19 RX ORDER — DIAZEPAM 5 MG/1
10 TABLET ORAL ONCE
Status: COMPLETED | OUTPATIENT
Start: 2023-12-19 | End: 2023-12-19

## 2023-12-19 RX ORDER — FENTANYL CITRATE 50 UG/ML
25 INJECTION, SOLUTION INTRAMUSCULAR; INTRAVENOUS
Status: DISCONTINUED | OUTPATIENT
Start: 2023-12-19 | End: 2023-12-19 | Stop reason: HOSPADM

## 2023-12-19 RX ORDER — PANTOPRAZOLE SODIUM 40 MG/1
40 TABLET, DELAYED RELEASE ORAL NIGHTLY
Status: DISCONTINUED | OUTPATIENT
Start: 2023-12-19 | End: 2023-12-20 | Stop reason: HOSPADM

## 2023-12-19 RX ORDER — SODIUM CHLORIDE 0.9 % (FLUSH) 0.9 %
3 SYRINGE (ML) INJECTION EVERY 12 HOURS SCHEDULED
Status: DISCONTINUED | OUTPATIENT
Start: 2023-12-19 | End: 2023-12-19 | Stop reason: HOSPADM

## 2023-12-19 RX ADMIN — GABAPENTIN 100 MG: 100 CAPSULE ORAL at 21:08

## 2023-12-19 RX ADMIN — ROCURONIUM BROMIDE 50 MG: 10 INJECTION, SOLUTION INTRAVENOUS at 12:55

## 2023-12-19 RX ADMIN — DIAZEPAM 10 MG: 5 TABLET ORAL at 09:06

## 2023-12-19 RX ADMIN — HYDROMORPHONE HYDROCHLORIDE 0.25 MG: 1 INJECTION, SOLUTION INTRAMUSCULAR; INTRAVENOUS; SUBCUTANEOUS at 18:02

## 2023-12-19 RX ADMIN — CELECOXIB 400 MG: 200 CAPSULE ORAL at 09:06

## 2023-12-19 RX ADMIN — ACETAMINOPHEN 1000 MG: 500 TABLET ORAL at 09:06

## 2023-12-19 RX ADMIN — FENTANYL CITRATE 25 MCG: 50 INJECTION, SOLUTION INTRAMUSCULAR; INTRAVENOUS at 17:37

## 2023-12-19 RX ADMIN — DEXAMETHASONE SODIUM PHOSPHATE 4 MG: 4 INJECTION, SOLUTION INTRA-ARTICULAR; INTRALESIONAL; INTRAMUSCULAR; INTRAVENOUS; SOFT TISSUE at 13:08

## 2023-12-19 RX ADMIN — SODIUM CHLORIDE, POTASSIUM CHLORIDE, SODIUM LACTATE AND CALCIUM CHLORIDE: 600; 310; 30; 20 INJECTION, SOLUTION INTRAVENOUS at 14:31

## 2023-12-19 RX ADMIN — OXYCODONE HYDROCHLORIDE 10 MG: 5 TABLET ORAL at 11:43

## 2023-12-19 RX ADMIN — ONDANSETRON 4 MG: 2 INJECTION INTRAMUSCULAR; INTRAVENOUS at 16:22

## 2023-12-19 RX ADMIN — ONDANSETRON HYDROCHLORIDE 8 MG: 8 TABLET, FILM COATED ORAL at 09:07

## 2023-12-19 RX ADMIN — LIDOCAINE HYDROCHLORIDE 60 MG: 20 INJECTION, SOLUTION INFILTRATION; PERINEURAL at 12:55

## 2023-12-19 RX ADMIN — FENTANYL CITRATE 25 MCG: 50 INJECTION, SOLUTION INTRAMUSCULAR; INTRAVENOUS at 17:30

## 2023-12-19 RX ADMIN — HYDROMORPHONE HYDROCHLORIDE 0.25 MG: 1 INJECTION, SOLUTION INTRAMUSCULAR; INTRAVENOUS; SUBCUTANEOUS at 18:14

## 2023-12-19 RX ADMIN — PANTOPRAZOLE SODIUM 40 MG: 40 TABLET, DELAYED RELEASE ORAL at 22:14

## 2023-12-19 RX ADMIN — ATORVASTATIN CALCIUM 10 MG: 20 TABLET, FILM COATED ORAL at 21:08

## 2023-12-19 RX ADMIN — Medication 3 ML: at 10:56

## 2023-12-19 RX ADMIN — CEFAZOLIN SODIUM 2000 MG: 2 INJECTION, SOLUTION INTRAVENOUS at 12:43

## 2023-12-19 RX ADMIN — PROPOFOL 150 MG: 10 INJECTION, EMULSION INTRAVENOUS at 12:55

## 2023-12-19 RX ADMIN — FAMOTIDINE 20 MG: 10 INJECTION INTRAVENOUS at 09:10

## 2023-12-19 RX ADMIN — FENTANYL CITRATE 25 MCG: 50 INJECTION, SOLUTION INTRAMUSCULAR; INTRAVENOUS at 13:56

## 2023-12-19 RX ADMIN — FENTANYL CITRATE 25 MCG: 50 INJECTION, SOLUTION INTRAMUSCULAR; INTRAVENOUS at 14:23

## 2023-12-19 RX ADMIN — Medication 6 ML: at 10:52

## 2023-12-19 RX ADMIN — Medication 3 ML: at 21:05

## 2023-12-19 RX ADMIN — OXYCODONE HYDROCHLORIDE 5 MG: 5 TABLET ORAL at 21:08

## 2023-12-19 RX ADMIN — SODIUM CHLORIDE, POTASSIUM CHLORIDE, SODIUM LACTATE AND CALCIUM CHLORIDE 9 ML/HR: 600; 310; 30; 20 INJECTION, SOLUTION INTRAVENOUS at 09:15

## 2023-12-19 RX ADMIN — GABAPENTIN 300 MG: 300 CAPSULE ORAL at 11:43

## 2023-12-19 RX ADMIN — SUGAMMADEX 200 MG: 100 INJECTION, SOLUTION INTRAVENOUS at 16:21

## 2023-12-19 NOTE — NURSING NOTE
CALLED AND NOTIFIED DR. PAREKH THAT PT HAS A RED OPEN RASH IN L GROIN AND THAT PT HAS A BUMPY RED RASH WITH WHITE PUSTULES IN HER L ARMPIT.  ALSO CALLED TO DR. CHAN.  THEY CALLED EACH OTHER AND PER DARÍO RN IN THE OR WITH DR IZABELLA VERDUZCO TO PROCEED.

## 2023-12-19 NOTE — OP NOTE
Operartive Report    Patient Name:  Becca Bansal  YOB: 1960    Date of Surgery:  12/19/2023    Pre-op Diagnosis:   Ductal carcinoma in situ (DCIS) of right breast [D05.11]  Atypical ductal hyperplasia of right breast [N60.91]  Intraductal papilloma of left breast [D24.2]       Post-Op Diagnosis:  Ductal carcinoma in situ (DCIS) of right breast [D05.11]  Atypical ductal hyperplasia of right breast [N60.91]  Intraductal papilloma of left breast [D24.2]    Procedures:  Left breast needle-localized excisional biopsy  Right breast needle localized excisional biopsy at 10:00  Right needle localized partial mastectomy at 12:00      Staff:  Surgeon:  Audelia Andino MD    Assistant: Hina Early CSA  was responsible for performing the following activities: Retraction, Suction, and Irrigation and their skilled assistance was necessary for the success of this case.      Anesthesia: General    Estimated Blood Loss:  20 mL    Implants:    Implant Name Type Inv. Item Serial No.  Lot No. LRB No. Used Action   CLIPAPPLR M/ ENDO LIGACLIP 9 3/8IN SM - [id number] Implant CLIPAPPLR M/ ENDO LIGACLIP 9 3/8IN   [employer]  DIV OF J AND J NR  1 Implanted                  Specimen:          Specimens       ID Source Type Tests Collected By Collected At Frozen?    A Breast, Left Tissue TISSUE PATHOLOGY EXAM   Audelia Andino MD 12/19/23 1332 No    Description: LEFT BREAST EXCISIONAL BIOPSY INK MARKS MARGINS . 18 GRAMS    Comment: FRESH FOR PERMANENT     B Breast, Right Tissue TISSUE PATHOLOGY EXAM   Audelia Andino MD 12/19/23 1356 No    Description: RIGHT BREAST EXCISIONAL BIOPSY STITCH AT 10 O CLOCK INK MARKS MARGIN 20 G    Comment: FRESH FOR PERMANENT    C Breast, Right Tissue TISSUE PATHOLOGY EXAM   Audelia Andino MD 12/19/23 1400 No    Description: ADDITIONAL MARGINS AT 10 O CLOCK SUPERIOR AND MEDIAL. 5 G    Comment: FRESH FOR PERMANENT    D Breast, Right Tissue  TISSUE PATHOLOGY EXAM   Audelia Andino MD 12/19/23 1400 No    Description: ADDITIONAL AT 10 OCLOCK POSTERIOR INK MARKS MARGIN. 1G    Comment: FRESH FOR PERMANENT    E Breast, Right Tissue TISSUE PATHOLOGY EXAM   Audelia Andino MD 12/19/23 1403 No    Description: ADDITIONAL MARGINS AT 10 O CLOCK LATERAL AND INFERIOR. 5 G    Comment: FRESH FOR PERMANENT    F Breast, Right Tissue TISSUE PATHOLOGY EXAM   Audelia Andino MD 12/19/23 1425 No    Description: RIGHT PARTIAL MASTECTOMY AT 12 OCLOCK INK MARKS MARGIN.    Comment: FRESH FOR PERMANENT WEIGHT 37G     G Breast, Right Tissue TISSUE PATHOLOGY EXAM   Audelia Andino MD 12/19/23 1435 No    Description: RIGHT PARTIAL MASTECTOMY AT 12 OCLOCK. ADDITIONAL MARGINS INFERIOR AND LATERAL. 8G    Comment: FRESH FOR PERMANENT. 8G    H Breast, Right Tissue TISSUE PATHOLOGY EXAM   Audelia Andino MD 12/19/23 1437 No    Description: RIGHT PARTIAL MASTECTOMY AT 12 OCLOCK. ADDITIONAL MARGINS ANTERIOR, SUPERIOR, MEDIAL INK MARKS MARGIN. 5G    Comment: FRESH FOR PERMANENT. 5G    I Breast, Left Tissue TISSUE PATHOLOGY EXAM   Fahad Berger MD 12/19/23 1450 No    Description: LEFT BREAST TISSUE. 536 G    Comment: FRESH FOR PERMANENT. 536G              Findings: Wire and clip in good position in all 3 specimen radiographs.    Complications: None     Indications: The patient is a 63 y.o. 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 She also has a diagnosis of right breast atypical ductal hyperplasia (ADH). She also has a diagnosis of a left breast intraductal papilloma. She desires breast conservation and will be undergoing oncoplastic closure with Dr. Berger.  The lesions required preoperative wire-localization. The risks and benefits of surgery were discussed preoperatively and she understood and agreed to proceed.     Description of Procedure:  Preoperatively the patient was taken to the  radiology department and the lesions localized with needles/wires. I reviewed the post-localization mammogram to determine the trajectory of the wires. The patient was identified in the preoperative area and brought to the operating room and placed supine on the table. Patient verification was performed and general anesthesia was induced. The patient was prepped and draped in sterile fashion with the wires/needles prepped into the field. A time out was performed and all were in agreement. I confirmed that the patient had received preoperative antibiotics.     I began with the left breast excisional biopsy. On the skin, I marked the suggested location of the lesion based on the mammographic localization imaging.  This was located along the lateral mastopexy limb.  I made an incision which included a small ellipse of skin immediately surrounding the needle. Flaps were raised according to the planned dissection. An anterior flap was raised first. Dissection was then carried out superiorly, inferiorly, medially, and laterally. The posterior dissection was completed and the specimen removed. The specimen was oriented with paint (red - superior, green - anterior, blue - inferior, yellow - medial, orange - lateral, black - posterior). Specimen radiograph showed the wire and clip in good position. The wound was irrigated and hemostasis achieved.     I then proceeded with the right breast excisional biopsy at 10:00. On the skin, I marked the suggested location of the lesion based on the mammographic localization imaging.  I made incision which encompassed a small ellipse of skin immediately surrounding the needle.  This was located just inferior to the lateral incision of the Wise pattern reduction. Flaps were raised according to the planned dissection. An anterior flap was raised first. Dissection was then carried out superiorly, inferiorly, medially, and laterally. The posterior dissection was completed and the specimen  removed. The specimen was oriented with paint (red - superior, green - anterior, blue - inferior, yellow - medial, orange - lateral, black - posterior). Specimen radiograph showed the wire and clip in good position.  An additional superior and medial margin was taken as a single specimen.  An additional posterior margin was taken as a single specimen.  An additional inferior and lateral margin was taken as a single specimen.  These were inked with the corresponding colors and ink marked the true margins.     I then proceeded with the partial mastectomy at 12:00. On the skin, I marked the suggested location of the lesion based on the mammographic localization imaging.  I made an incision through the superolateral aspect of the mastopexy keyhole. Flaps were raised according to the planned dissection. An anterior flap was raised first. Dissection was then carried out superiorly, inferiorly, medially, and laterally. The wire was delivered into the wound. The posterior dissection extended to the pectoralis major muscle. The specimen, including the pectoralis fascia was lifted off the underlying muscle and removed. The specimen was oriented with paint (red - superior, green - anterior, blue - inferior, yellow - medial, orange - lateral, black - posterior). Specimen radiograph showed the wire and clip in good position.  An additional inferior and lateral margin was taken as a single specimen.  An additional anterior, superior, and medial margin was taken as a single specimen.  These were inked with the corresponding colors and ink marked the true margins. The wound was irrigated and hemostasis achieved. Marking clips were placed in the partial mastectomy cavity.  Dr. Berger then continued with closure.  Counts were correct at the end of my portion of the case.    Audelia Andino MD     Date: 12/19/2023  Time: 15:30 EST

## 2023-12-19 NOTE — H&P
BREAST SURGERY HISTORY & PHYSICAL        Chief complaint: Rght breast DCIS, right breast ADH, & Left breast intraductal papilloma        Subjective   HPI:   10/12/23:  Ms. Becca Bansal is a 62 yo woman, seen at the request of MARINO Deng, for a new diagnosis of right breast atypical ductal hyperplasia.  She had not had a screening mammogram for 10 years, then in July of this year presented for a screening mammogram.  She was called back for multiple right breast findings.  Diagnostic imaging demonstrated 2 separate groups of calcifications and a right breast focal asymmetry without an ultrasound correlate.  She underwent biopsy of one of the groups of calcifications and this showed ADH.  The other group of calcifications and a focal asymmetry were placed in imaging surveillance.  Prior to this appointment, she underwent a breast MRI which showed an area of linear enhancement in the left breast. Her work-up is detailed in the breast history section below. Prior to the biopsy, she denies any breast lumps, pain, skin changes, or nipple discharge. She denies any prior history of abnormal mammograms or breast biopsies. She denies any family history of breast or ovarian cancer.      11/16/23:  After her last visit, she underwent a right stereotactic biopsy which showed DCIS and a left MRI guided biopsy which showed an intraductal papilloma.  She returns today to discuss the results.      12/19/23, Interval History:  She presents today for surgery. She denies any new complaints.             Oncology/Hematology History   Ductal carcinoma in situ (DCIS) of right breast   1/12/2023 Imaging     Screening MMG ([facility]):  The current examination reveals no new abnormalities or suspicious changes in appearance.  BI-RADS 2      7/10/2023 Initial Diagnosis     Ductal carcinoma in situ (DCIS) of right breast      7/10/2023 Imaging     Screening MMG with [facility] ([facility]):   Right breast:   Focal asymmetry in the upper outer right breast middle 3rd depth with associated   grouped calcifications.  More anteriorly in the upper outer right breast is a smaller group of   calcifications.  Left breast:  Development of several course benign type left breast calcifications.  There are   developing vascular and secretory type calcifications visualized.  No architectural distortion or   suspicious mass.  BI-RADS 0: Incomplete      8/14/2023 Imaging     Right Diagnostic MMG with [facility] & Right Breast US ([facility]):  MMG:  Right diagnostic mammogram:      There are subtle punctate and fine pleomorphic calcifications within the outer aspect of the right   breast which appears new from the prior examination.   There are punctate, round, and coarse heterogeneous calcifications which appear to be along a   linear distribution associated with a vessel.  These correspond to the upper outer aspect of the   right breast.  There is a subtle focal asymmetry within the upper-outer quadrant of the right breast on current   examination which appears fairly similar to the prior more remote examination from 2013 when   accounting for differences in technique.  US:  Sonographic grayscale and color Doppler images of the right breast were obtained with   representative examples submitted to PACs for interpretation.  Imaging at the 11 o'clock position   corresponding to the focal asymmetry within the upper-outer quadrant of the right breast   demonstrates no sonographic correlate.  There are incidental intramammary lymph nodes at the 11   o'clock position 13 cm from the nipple.   IMPRESSION:  1. Subtle punctate and fine pleomorphic calcifications within the outer aspect of the right breast   which appear new from prior examination.  Recommend right breast stereotactic biopsy for definitive   tissue diagnosis.  2. Additional calcifications appear linear in distribution and appear to correlate to a small   vessel.  These are considered  probably benign and attention on follow-up right diagnostic mammogram   6 months is recommended.  3. Right breast focal asymmetry appears like the more remote prior examination from 2013 when   accounting for differences in technique.  No sonographic correlate was identified.  This could also   be re-evaluated on the follow-up right diagnostic mammogram in 6 months.    BI-RADS 4: Suspicious      8/21/2023 Biopsy     Right Breast, Stereotactic Biopsy ( [facility]):  Right breast, outer, stereotactic-guided core biopsy:               - Atypical ductal hyperplasia (ADH) involving an intraductal papilloma  - Usual ductal hyperplasia (UDH)  - Columnar cell change  - Microcalcifications   Comment:  The above diagnosis was rendered in expert consultation by Mery Sarah MD , [facility] .  -Hydrocoil clip.      10/7/2023 Imaging     Bilateral Breast MRI ([facility]):  In the middle third of the lateral aspect of the right breast at the 9 o'clock position centered on the order of 6.5 cm from the nipple there is a focal signal void seen at the inferior margin of non-mass enhancement that measures on the order of 1.4 cm in the anterior to posterior dimension, 0.8 cm in the superior to inferior dimension and 0.7 cm in the medial to lateral dimension. This corresponds to the biopsy-proven site of ADH with the coil-shaped metallic clip.   No other areas of suspicious enhancement or morphology are seen in the right breast. I see no evidence for abnormal right breast skin, nipple or chest wall enhancement and there is no evidence for right axillary or internal mammary chain adenopathy.  In the anterior one-third of the lateral aspect of the left breast centered at the 3:30 position on the order of 4 cm from the nipple there is linear enhancement that measures on the order of 1.5 cm in the anterior to posterior dimension, 0.6 cm in the superior to inferior dimension and 0.7 cm in the medial to lateral dimension. No definite  mammographic correlate is appreciated.   No areas of abnormal enhancement or morphology are otherwise seen in the left breast. I see no evidence for abnormal left breast skin, nipple or chest wall enhancement and there is no evidence for left axillary or internal mammary chain adenopathy.  BIRADS 4: Suspicious.       10/27/2023 Imaging     Additional MRI Images ([facility]):  Additional images were taken of the left breast pre and post intravenous administration of gadolinium to evaluate an area of linear enhancement and determine if the area showed features of a vessel.   Following additional acquisition of the images the area of linear enhancement does not show evidence of being a vessel. It is not reliably attached to a vessel or branching structure. Therefore, continued biopsy  of the area of linear enhancement in the left breast at the 3:30 position is recommended.  BI-RADS Category 4: Suspicious.      11/14/2023 Biopsy     Left Breast MR-Guided Biopsy & Right Breast Stereotactic Biopsy ([facility]):     1.  Left breast, 3:00, MRI-guided biopsies for linear enhancement:               -Intraductal papilloma, 2 mm in greatest extent, present as fragments in 2 of 19 total cores.    -No atypical hyperplasia, in situ nor invasive carcinoma identified.     2.  Right breast, 11:00, stereotactic biopsies for calcifications: INTERMEDIATE GRADE DUCTAL CARCINOMA IN SITU (DCIS).               -Architectural patterns: Solid and cribriform with central necrosis and associated microcalcifications.               -DCIS is present in a single tissue core measuring up to 7 mm maximally.               -Additional findings include fibroadenomatoid hyperplasia.               -No evidence of in situ carcinoma identified.     ER+ (%, strong)  AR+ (%, strong)  Ki-67 12%            Review of Systems:  See interval history.        Medications:  See chart.     Allergies:  No Known Allergies           Family History   Problem Relation  Age of Onset    Irritable bowel syndrome Mother      Heart disease Father      Diabetes Father      No Known Problems Sister      Cancer Brother      Diabetes Brother      Esophageal cancer Brother      No Known Problems Brother      No Known Problems Brother      No Known Problems Brother      No Known Problems Brother      Malig Hyperthermia Neg Hx                 Objective   PHYSICAL EXAMINATION:   Vitals:    12/19/23 0834   BP: 152/72   Pulse: 94   Resp: 16   Temp: 98 °F (36.7 °C)   SpO2: 95%   ECOG 0 - Asymptomatic  General: NAD, well appearing  Psych: a&o x3, normal mood and affect  Eyes: EOMI, no scleral icterus  ENMT: neck supple without masses or thyromegaly, mucous membranes moist  MSK: normal gait, normal ROM in bilateral shoulders  Lymph nodes: no cervical, supraclavicular or axillary lymphadenopathy  Breast: symmetric, very large size, pendulous  Right: No visible abnormalities on inspection while seated, with arms raised or hands on hips. No masses, skin changes, or nipple abnormalities.  Left: No visible abnormalities on inspection while seated, with arms raised or hands on hips. No masses, skin changes, or nipple abnormalities.        I have independently reviewed her imaging and here are my findings:   In the right lateral breast, there initially was a 14 mm cluster of calcifications.  This represents the biopsy-proven site of ADH marked with a HydroMARK clip.  MRI shows 14 mm of non-mass enhancement with the clip located inferiorly.  In the right upper outer quadrant, there is about 20 mm of linear calcifications.  This represents biopsy-proven DCIS marked with a bowtie clip (7 mm on core).  Additionally in the right upper outer quadrant, there is a focal asymmetry without an ultrasound correlate, which is probably benign.  I discussed this on the phone with Dr. Farrell today and he believes that this will be included with the calcifications in the partial mastectomy specimen.  In the left breast at  330 on MRI, there is 1.5 cm of linear enhancement.  This represents a biopsy-proven intraductal papilloma.          Assessment & Plan  Assessment:   1. 63 y.o. F with a new diagnosis of right breast ductal carcinoma in situ (DCIS), intermediate grade, ER/IL positive.  2.  She also has a diagnosis of right breast atypical ductal hyperplasia (ADH).  3.  She also has a diagnosis of a left breast intraductal papilloma.     Plan:  -Right needle-localized partial mastectomy and right breast needle-localized excisional biopsy and left breast needle-localized excisional biopsy with bilateral oncoplastic closure.     Audelia Andino MD    Copied text in this note has been reviewed and is accurate as of 12/19/23.

## 2023-12-19 NOTE — ANESTHESIA POSTPROCEDURE EVALUATION
Patient: Becca Bansal    Procedure Summary       Date: 12/19/23 Room / Location: [facility] OR  / [facility] MAIN OR    Anesthesia Start: 1250 Anesthesia Stop: 1642    Procedures:       Right needle localized partial mastectomy and right breast needle localized excisional biopsy, Left breast needle-localized excisional biopsy (Right: Breast)      BILATERAL ONCOPLASTIC REDUCTION/MASTOPEXY (Bilateral: Chest) Diagnosis:       Ductal carcinoma in situ (DCIS) of right breast      Atypical ductal hyperplasia of right breast      Intraductal papilloma of left breast      (Ductal carcinoma in situ (DCIS) of right breast [D05.11])      (Atypical ductal hyperplasia of right breast [N60.91])      (Intraductal papilloma of left breast [D24.2])    Surgeons: Audelia Andino MD; Fahad Berger MD Provider: Christiano Campos MD    Anesthesia Type: general ASA Status: 3            Anesthesia Type: general    Vitals  Vitals Value Taken Time   /79 12/19/23 1730   Temp 36.3 °C (97.4 °F) 12/19/23 1640   Pulse 66 12/19/23 1734   Resp 20 12/19/23 1715   SpO2 100 % 12/19/23 1733   Vitals shown include unfiled device data.        Post Anesthesia Care and Evaluation    Patient location during evaluation: bedside  Patient participation: complete - patient participated  Level of consciousness: awake  Pain management: adequate    Airway patency: patent  Anesthetic complications: No anesthetic complications    Cardiovascular status: acceptable  Respiratory status: acceptable  Hydration status: acceptable

## 2023-12-19 NOTE — OP NOTE
Pre-Operative Diagnosis: breast asymmetry following bilateral partial mastectomy   Post-Operative Diagnosis: Same     Procedure Performed:   Bilateral oncoplastic reduction     Surgeon: NOÉ Berger MD     Assistant: none     Anesthesia: General     Estimated Blood Loss:  50     Specimens:  Right breast 613g , left breast 536g     Complications: None     Indications: She presented after diagnosis of bilateral breast lesions in a lateral position amenable to breast conservation but likely leaving a significant defect after partial mastectomy.  We discussed oncoplastic reconstruction for correction of the defect and prevention of collapse and symmetry.     We discussed risks, benefits and alternatives including but not limited to: bleeding, infection, asymmetry, poor or slow wound healing, need for further surgery, possible recurrence.  The patient elected to proceed.     Description of Procedure: The patient was met in the preoperative holding area.  All questions were answered and informed consent was assured.       She was marked in the sitting position and breast landmarks drawn and wise pattern drawn on both sides giving access to the lateral needle localization sites.  She was transferred the operating placed supine on table with arms padded and secured.     After induction of appropriate anesthesia, a timeout was performed correctly identifying the patient, operative site, and procedure to be performed.  All present were in agreement.     Jacobsen were confirmed with Dr. Andino After completion of the left partial mastectomy the superior medial pedicle was de-epithelialized and dissected straight down to the chest wall with no undermining of the pedicle and minimal dissection just to free up the periareolar T-junction.  The inferior parenchyma was then excised and the medial and lateral flaps undermined just enough to allow the transposed to the breast meridian.  The breast was then tailor tacked.     Right  breast was then dressed and partial mastectomy defect was in the lateral breast as expected and so a superior medial pedicle designed on the side as well.  dissection of the excess breast tissue was performed.  The medial and lateral pillars were mobilized just slightly to allow them to reach the breast meridian and both breast were tailor tacked and she was placed in a sitting position.  Some additional resection performed at the lateral portion of the left breast to improve symmetry in the both breast were copiously irrigated and immaculate hemostasis achieved.  Closure was performed of both sides with 2-0 PDS in the vertical pillar. Drains placed and topical TXA placed throughout both pockets.  Remaining closure performed with 3-0 Monocryl and INSORB deep dermal layer and 4-0 Monocryl and 3-0 stratafix in the intracuticular.  Incisions were dressed with Dermabond and she was placed in a well-padded Ace wrap.     The patient was then aroused from anesthesia with ease and transferred to the postoperative care area in good condition. All sponge, needle, and instrument counts were correct.

## 2023-12-19 NOTE — ANESTHESIA PROCEDURE NOTES
Airway  Urgency: elective    Date/Time: 12/19/2023 1:00 PM  Airway not difficult    General Information and Staff    Patient location during procedure: OR  CRNA/CAA: Maria De Jesus Peña CRNA    Indications and Patient Condition  Indications for airway management: airway protection    Preoxygenated: yes  MILS maintained throughout  Mask difficulty assessment: 1 - vent by mask    Final Airway Details  Final airway type: endotracheal airway      Successful airway: ETT  Cuffed: yes   Successful intubation technique: direct laryngoscopy  Facilitating devices/methods: intubating stylet  Endotracheal tube insertion site: oral  Blade: Lopez  Blade size: 3  ETT size (mm): 7.0  Cormack-Lehane Classification: grade I - full view of glottis  Placement verified by: chest auscultation and capnometry   Measured from: lips  ETT/EBT  to lips (cm): 22  Number of attempts at approach: 1  Assessment: lips, teeth, and gum same as pre-op and atraumatic intubation

## 2023-12-20 VITALS
HEART RATE: 60 BPM | SYSTOLIC BLOOD PRESSURE: 128 MMHG | BODY MASS INDEX: 39.41 KG/M2 | HEIGHT: 63 IN | RESPIRATION RATE: 16 BRPM | WEIGHT: 222.4 LBS | OXYGEN SATURATION: 98 % | TEMPERATURE: 96.8 F | DIASTOLIC BLOOD PRESSURE: 80 MMHG

## 2023-12-20 PROCEDURE — 99024 POSTOP FOLLOW-UP VISIT: CPT | Performed by: SURGERY

## 2023-12-20 PROCEDURE — G0378 HOSPITAL OBSERVATION PER HR: HCPCS

## 2023-12-20 RX ORDER — AMOXICILLIN 250 MG
2 CAPSULE ORAL DAILY PRN
Qty: 30 TABLET | Refills: 1 | Status: SHIPPED | OUTPATIENT
Start: 2023-12-20 | End: 2023-12-29

## 2023-12-20 RX ORDER — HYDROCODONE BITARTRATE AND ACETAMINOPHEN 5; 325 MG/1; MG/1
1 TABLET ORAL EVERY 4 HOURS PRN
Qty: 20 TABLET | Refills: 0 | Status: SHIPPED | OUTPATIENT
Start: 2023-12-20 | End: 2023-12-29

## 2023-12-20 RX ORDER — DOCUSATE SODIUM 250 MG
250 CAPSULE ORAL DAILY PRN
Qty: 30 CAPSULE | Refills: 1 | Status: SHIPPED | OUTPATIENT
Start: 2023-12-20 | End: 2023-12-29

## 2023-12-20 RX ORDER — ACETAMINOPHEN 325 MG/1
650 TABLET ORAL EVERY 4 HOURS PRN
Qty: 60 TABLET | Refills: 0 | Status: SHIPPED | OUTPATIENT
Start: 2023-12-20

## 2023-12-20 RX ORDER — GABAPENTIN 100 MG/1
100 CAPSULE ORAL EVERY 8 HOURS
Qty: 60 CAPSULE | Refills: 1 | Status: SHIPPED | OUTPATIENT
Start: 2023-12-20 | End: 2023-12-29

## 2023-12-20 RX ORDER — ONDANSETRON 8 MG/1
8 TABLET, ORALLY DISINTEGRATING ORAL EVERY 8 HOURS PRN
Qty: 10 TABLET | Refills: 1 | Status: SHIPPED | OUTPATIENT
Start: 2023-12-20 | End: 2023-12-29

## 2023-12-20 RX ADMIN — GABAPENTIN 100 MG: 100 CAPSULE ORAL at 05:15

## 2023-12-20 NOTE — NURSING NOTE
Discharge instructions provided. Patient received soft bra from Dr. Berger's office prior to leaving, she is to keep this in place. She may shower in a few days - date specified on her paperwork. She received Rx from [facility] outpatient pharmacy. Payam at bedside. All questions/concerns addressed. Follow up appts scheduled.

## 2023-12-20 NOTE — CASE MANAGEMENT/SOCIAL WORK
Case Management Discharge Note      Final Note: Home, no needs.         Selected Continued Care - Discharged on 12/20/2023 Admission date: 12/19/2023 - Discharge disposition: Home or Self Care      Destination    No services have been selected for the patient.                Durable Medical Equipment    No services have been selected for the patient.                Dialysis/Infusion    No services have been selected for the patient.                Home Medical Care    No services have been selected for the patient.                Therapy    No services have been selected for the patient.                Community Resources    No services have been selected for the patient.                Community & DME    No services have been selected for the patient.                         Final Discharge Disposition Code: 01 - home or self-care

## 2023-12-20 NOTE — PROGRESS NOTES
Breast Surgery Progress Note    Chief complaint: sp surgery    Subjective:  Ms. Bansal did well overnight. She has eaten, has been up out of bed, and her pain is controlled.     Objective:  Vitals:    12/20/23 0459   BP: 128/80   Pulse: 60   Resp: 16   Temp: 96.8 °F (36 °C)   SpO2: 98%       Physical Exam:  General: NAD  HEENT: NCAT, EOMI  Lungs: respirations nonlabored  Chest: Bilateral mastopexy incisions c/d/i, flaps and NACs healthy    Assessment:  63 y.o. female POD 1 bilateral excisional biopsy and right partial mastectomy with oncoplastic closure    Plan:  -Discharge home later this morning per Dr. Berger.   -She already has a follow-up appointment with me scheduled.  -I will call her with pathology results.    Audelia Andino MD

## 2023-12-20 NOTE — PLAN OF CARE
Goal Outcome Evaluation:  Plan of Care Reviewed With: patient        Progress: improving  Outcome Evaluation: Alert and oriented. VSS. O2 2L/min. Roxicodone given for mild incisional pain. Regular diet taken and tolerated. Saline locked.  Bilateral breast incision dressing dry and  intact. JUSTUS X2 with minimal serous sanguinous output 3 ml each. Up with assist, voided freely. Ambulated in the saucedo. DB coughing and IS use encouraged.  Slept on and off.

## 2023-12-22 DIAGNOSIS — D05.11 DUCTAL CARCINOMA IN SITU (DCIS) OF RIGHT BREAST: Primary | ICD-10-CM

## 2023-12-22 LAB
LAB AP CASE REPORT: NORMAL
LAB AP CASE REPORT: NORMAL
LAB AP CLINICAL INFORMATION: NORMAL
LAB AP CLINICAL INFORMATION: NORMAL
LAB AP SPECIAL STAINS: NORMAL
LAB AP SPECIAL STAINS: NORMAL
LAB AP SYNOPTIC CHECKLIST: NORMAL
LAB AP SYNOPTIC CHECKLIST: NORMAL
PATH REPORT.FINAL DX SPEC: NORMAL
PATH REPORT.FINAL DX SPEC: NORMAL
PATH REPORT.GROSS SPEC: NORMAL
PATH REPORT.GROSS SPEC: NORMAL

## 2023-12-28 ENCOUNTER — PATIENT OUTREACH (OUTPATIENT)
Dept: OTHER | Facility: HOSPITAL | Age: 63
End: 2023-12-28
Payer: MEDICARE

## 2023-12-28 NOTE — PROGRESS NOTES
Nurse Navigator F/U Visit: Call placed to patient for f/u visit. 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 She is s/p right needle localized partial mastectomy, right breast needle localized excisional biopsy, left breast needle-localized excisional biopsy and bilateral oncoplastic reduction/mastopexy on 12/19/23. States she feels really good. No c/o pain voiced. Taking Tylenol occasionally. States she has had some itching and redness at incision site. Says she was supposed to see Dr. Berger yesterday in the office. However, she received call informing her she didn't need to come in because drains had already been removed. She told office staff that she wanted to be seen due to itching and redness of incision site. Patient says she was instructed to take Benadryl and that Dr. Berger would contact her per telehealth. However, patient states she never heard back from Dr. Berger. Reports itching has improved but incision remains red. Patient says an appointment has been arranged with Dr. Andino tomorrow morning. No further needs or concerns noted at this time. Encouraged to call with questions or concerns. Continue to follow.……Daisy Cid RN, Oncology Nurse Navigator

## 2023-12-29 ENCOUNTER — OFFICE VISIT (OUTPATIENT)
Dept: SURGERY | Facility: CLINIC | Age: 63
End: 2023-12-29
Payer: MEDICARE

## 2023-12-29 VITALS
BODY MASS INDEX: 39.34 KG/M2 | HEIGHT: 63 IN | SYSTOLIC BLOOD PRESSURE: 130 MMHG | WEIGHT: 222 LBS | RESPIRATION RATE: 17 BRPM | OXYGEN SATURATION: 100 % | HEART RATE: 68 BPM | DIASTOLIC BLOOD PRESSURE: 78 MMHG

## 2023-12-29 DIAGNOSIS — Z48.89 POSTOPERATIVE VISIT: Primary | ICD-10-CM

## 2023-12-29 DIAGNOSIS — D05.11 DUCTAL CARCINOMA IN SITU (DCIS) OF RIGHT BREAST: ICD-10-CM

## 2023-12-29 PROCEDURE — 1160F RVW MEDS BY RX/DR IN RCRD: CPT | Performed by: SURGERY

## 2023-12-29 PROCEDURE — 99024 POSTOP FOLLOW-UP VISIT: CPT | Performed by: SURGERY

## 2023-12-29 PROCEDURE — 1159F MED LIST DOCD IN RCRD: CPT | Performed by: SURGERY

## 2023-12-29 RX ORDER — SULFAMETHOXAZOLE AND TRIMETHOPRIM 800; 160 MG/1; MG/1
1 TABLET ORAL 2 TIMES DAILY
Qty: 14 TABLET | Refills: 0 | Status: SHIPPED | OUTPATIENT
Start: 2023-12-29 | End: 2024-01-05

## 2023-12-29 RX ORDER — METHYLPREDNISOLONE 4 MG/1
TABLET ORAL
Qty: 21 TABLET | Refills: 0 | Status: SHIPPED | OUTPATIENT
Start: 2023-12-29

## 2023-12-29 NOTE — LETTER
December 29, 2023       No Recipients    Patient: Becca Bansal   YOB: 1960   Date of Visit: 12/29/2023     Dear MARINO Wilson:       Thank you for referring Becca Bansal to me for evaluation. Below are the relevant portions of my assessment and plan of care.    If you have questions, please do not hesitate to call me. I look forward to following Becca along with you.         Sincerely,        Audelia Andino MD        CC:   No Recipients    Audelia Andino MD  12/29/23 1358  Sign when Signing Visit  [facility]     Referring Provider: MARINO Deng     Chief complaint: Postoperative visit      Subjective  HPI:   10/12/2023:  Ms. Becca Bansal is a 62 yo woman, seen at the request of MARINO Deng, for a new diagnosis of right breast atypical ductal hyperplasia.  She had not had a screening mammogram for 10 years, then in July of this year presented for a screening mammogram.  She was called back for multiple right breast findings.  Diagnostic imaging demonstrated 2 separate groups of calcifications and a right breast focal asymmetry without an ultrasound correlate.  She underwent biopsy of one of the groups of calcifications and this showed ADH.  The other group of calcifications and a focal asymmetry were placed in imaging surveillance.  Prior to this appointment, she underwent a breast MRI which showed an area of linear enhancement in the left breast. Her work-up is detailed in the breast history section below. Prior to the biopsy, she denies any breast lumps, pain, skin changes, or nipple discharge. She denies any prior history of abnormal mammograms or breast biopsies. She denies any family history of breast or ovarian cancer.     11/16/2023:  After her last visit, she underwent a right stereotactic biopsy which showed DCIS and a left MRI guided biopsy which showed an intraductal papilloma.  She returns today to discuss the  results.       12/29/2023:  She underwent right partial mastectomy with right breast excisional biopsy and left breast excisional biopsy on 12/19/23. See surgery & pathology details below in oncologic history.  Unfortunately she developed an itchy rash on both breasts over the past few days.  She has been taking Benadryl to help with the itching.  She denies any fevers.      Oncology/Hematology History   Ductal carcinoma in situ (DCIS) of right breast   1/12/2023 Imaging    Screening MMG ([facility]):  The current examination reveals no new abnormalities or suspicious changes in appearance.  BI-RADS 2     7/10/2023 Initial Diagnosis    Ductal carcinoma in situ (DCIS) of right breast     7/10/2023 Imaging    Screening MMG with Reggie ([facility]):   Right breast:  Focal asymmetry in the upper outer right breast middle 3rd depth with associated   grouped calcifications.  More anteriorly in the upper outer right breast is a smaller group of   calcifications.  Left breast:  Development of several course benign type left breast calcifications.  There are   developing vascular and secretory type calcifications visualized.  No architectural distortion or   suspicious mass.  BI-RADS 0: Incomplete     8/14/2023 Imaging    Right Diagnostic MMG with Reggie & Right Breast US ([facility]):  MMG:  Right diagnostic mammogram:      There are subtle punctate and fine pleomorphic calcifications within the outer aspect of the right   breast which appears new from the prior examination.   There are punctate, round, and coarse heterogeneous calcifications which appear to be along a   linear distribution associated with a vessel.  These correspond to the upper outer aspect of the   right breast.  There is a subtle focal asymmetry within the upper-outer quadrant of the right breast on current   examination which appears fairly similar to the prior more remote examination from 2013 when   accounting for differences in  technique.  US:  Sonographic grayscale and color Doppler images of the right breast were obtained with   representative examples submitted to PACs for interpretation.  Imaging at the 11 o'clock position   corresponding to the focal asymmetry within the upper-outer quadrant of the right breast   demonstrates no sonographic correlate.  There are incidental intramammary lymph nodes at the 11   o'clock position 13 cm from the nipple.   IMPRESSION:  1. Subtle punctate and fine pleomorphic calcifications within the outer aspect of the right breast   which appear new from prior examination.  Recommend right breast stereotactic biopsy for definitive   tissue diagnosis.  2. Additional calcifications appear linear in distribution and appear to correlate to a small   vessel.  These are considered probably benign and attention on follow-up right diagnostic mammogram   6 months is recommended.  3. Right breast focal asymmetry appears like the more remote prior examination from 2013 when   accounting for differences in technique.  No sonographic correlate was identified.  This could also   be re-evaluated on the follow-up right diagnostic mammogram in 6 months.    BI-RADS 4: Suspicious     8/21/2023 Biopsy    Right Breast, Stereotactic Biopsy ( [facility]):  Right breast, outer, stereotactic-guided core biopsy:               - Atypical ductal hyperplasia (ADH) involving an intraductal papilloma  - Usual ductal hyperplasia (UDH)  - Columnar cell change  - Microcalcifications   Comment:  The above diagnosis was rendered in expert consultation by Mery Sarah MD , [facility] .  -Hydrocoil clip.     10/7/2023 Imaging    Bilateral Breast MRI ( [facility]):  In the middle third of the lateral aspect of the right breast at the 9 o'clock position centered on the order of 6.5 cm from the nipple there is a focal signal void seen at the inferior margin of non-mass enhancement that measures on the order of 1.4 cm in the anterior to  posterior dimension, 0.8 cm in the superior to inferior dimension and 0.7 cm in the medial to lateral dimension. This corresponds to the biopsy-proven site of ADH with the coil-shaped metallic clip.   No other areas of suspicious enhancement or morphology are seen in the right breast. I see no evidence for abnormal right breast skin, nipple or chest wall enhancement and there is no evidence for right axillary or internal mammary chain adenopathy.  In the anterior one-third of the lateral aspect of the left breast centered at the 3:30 position on the order of 4 cm from the nipple there is linear enhancement that measures on the order of 1.5 cm in the anterior to posterior dimension, 0.6 cm in the superior to inferior dimension and 0.7 cm in the medial to lateral dimension. No definite mammographic correlate is appreciated.   No areas of abnormal enhancement or morphology are otherwise seen in the left breast. I see no evidence for abnormal left breast skin, nipple or chest wall enhancement and there is no evidence for left axillary or internal mammary chain adenopathy.  BIRADS 4: Suspicious.      10/27/2023 Imaging    Additional MRI Images ([facility]):  Additional images were taken of the left breast pre and post intravenous administration of gadolinium to evaluate an area of linear enhancement and determine if the area showed features of a vessel.   Following additional acquisition of the images the area of linear enhancement does not show evidence of being a vessel. It is not reliably attached to a vessel or branching structure. Therefore, continued biopsy  of the area of linear enhancement in the left breast at the 3:30 position is recommended.  BI-RADS Category 4: Suspicious.     11/14/2023 Biopsy    Left Breast MR-Guided Biopsy & Right Breast Stereotactic Biopsy ([facility]):    1.  Left breast, 3:00, MRI-guided biopsies for linear enhancement:               -Intraductal papilloma, 2 mm in greatest extent, present as  fragments in 2 of 19 total cores.    -No atypical hyperplasia, in situ nor invasive carcinoma identified.  -Stoplight clip.     2.  Right breast, 11:00, stereotactic biopsies for calcifications: INTERMEDIATE GRADE DUCTAL CARCINOMA IN SITU (DCIS).               -Architectural patterns: Solid and cribriform with central necrosis and associated microcalcifications.               -DCIS is present in a single tissue core measuring up to 7 mm maximally.               -Additional findings include fibroadenomatoid hyperplasia.               -No evidence of in situ carcinoma identified.  -Bowtie clip.     ER+ (%, strong)  KY+ (%, strong)  Ki-67 12%     12/19/2023 Surgery    Left breast needle-localized excisional biopsy, right breast needle-localized excisional biopsy, and right needle-localized partial mastectomy with bilateral oncoplastic reduction    1. Left Breast, Needle Localized Excisional Biopsy (18 G):               A. Breast parenchyma with micropapilloma and usual ductal hyperplasia.               B. Clip and biopsy site changes are present.   C. Unremarkable skin.                 2. Right Breast, 10:00, Needle Localized Excisional Biopsy (20 G):               A. Breast parenchyma with focal atypical ductal hyperplasia.               B. Clip and biopsy site changes are present.               C. Unremarkable skin.     3. Right Breast, 10:00, Additional Superior and Medial Margins, Reexcision:               A. Breast parenchyma with no significant histopathologic changes       (additional 11 mm of benign tissue).     4. Right Breast, 10:00, Additional Posterior Margin, Reexcision:               A. Breast parenchyma with usual ductal hyperplasia involving an intraductal papilloma       (additional 8 mm of benign tissue).     5. Right Breast, 10:00, Additional Lateral and Inferior Margins, Reexcision:               A. Breast parenchyma with no significant histopathologic changes       (additional 10 mm of  benign tissue).     6. Right Breast, 12:00, Needle Localized Partial Mastectomy (37 G):               A. INTERMEDIATE TO HIGH-GRADE DUCTAL CARCINOMA IN SITU (DCIS):  1. Solid and cribriform types with associated necrosis and calcifications.  2. Extent of DCIS: 10 mm (based on slices involved).  3. Margins are negative for in situ carcinoma; closest distance: DCIS is present 4 mm from the       inferior margin (superseded by specimen #7).  B. Clip and biopsy site changes are present adjacent to DCIS.  C. See synoptic report.     7. Right Breast, 12:00, Additional Inferior and Lateral Margins, Reexcision:               A. Breast parenchyma with no significant histopathologic changes       (additional 8 mm of tissue free of carcinoma).     8. Right Breast, 12:00, Additional Anterior, Superior, and Medial Margins, Reexcision:               A. Breast parenchyma with no significant histopathologic changes       (additional 8 mm of tissue free of carcinoma).     9. Left Breast Tissue, Reduction Mammoplasty (536 G):               A. Benign skin and breast parenchyma with no significant histopathologic changes.     10. Right Breast Tissue, Reduction Mammoplasty (613 G):   A. Benign skin and breast parenchyma with no significant histopathologic changes.         Review of Systems:  See interval history.      Medications:    Current Outpatient Medications:   •  acetaminophen (TYLENOL) 325 MG tablet, Take 2 tablets by mouth Every 4 (Four) Hours As Needed for Mild Pain., Disp: 60 tablet, Rfl: 0  •  atorvastatin (LIPITOR) 10 MG tablet, Take 1 tablet by mouth Every Night., Disp: , Rfl:   •  azelastine (ASTELIN) 0.1 % nasal spray, 1 spray As Needed., Disp: , Rfl:   •  benzonatate (TESSALON) 200 MG capsule, Take 1 capsule by mouth As Needed. (Patient not taking: Reported on 10/25/2023), Disp: , Rfl:   •  Cannabidiol 100 MG/ML solution, Take 1 mg by mouth Daily. CBD OIL, HAS BEEN OFF FOR 2 WEEKS., Disp: , Rfl:   •  Cholecalciferol  (Vitamin D3) 1.25 MG (45473 UT) tablet, Take 1 tablet by mouth 2 (Two) Times a Week. NIGHT ON Sunday AND THURSDAY, Disp: , Rfl:   •  CINNAMON PO, Take 1 tablet by mouth Daily. HOLD ONE WEEK FOR SURGERY, Disp: , Rfl:   •  dapagliflozin Propanediol (Farxiga) 10 MG tablet, Take 10 mg by mouth Daily., Disp: , Rfl:   •  lisinopril-hydrochlorothiazide (PRINZIDE,ZESTORETIC) 20-25 MG per tablet, Take 1 tablet by mouth Daily., Disp: , Rfl:   •  loratadine (CLARITIN) 10 MG tablet, Take 1 tablet by mouth Daily., Disp: , Rfl:   •  methylPREDNISolone (MEDROL) 4 MG dose pack, Take as directed on package instructions., Disp: 21 tablet, Rfl: 0  •  montelukast (SINGULAIR) 10 MG tablet, Take 1 tablet by mouth Every Night., Disp: , Rfl:   •  Multiple Vitamins-Minerals (Multi Adult Gummies) chewable tablet, Chew 1 tablet Daily. HOLD FOR ONE WEEK FOR SURGERY, Disp: , Rfl:   •  omeprazole (priLOSEC) 20 MG capsule, 1 capsule every night at bedtime., Disp: , Rfl:   •  sulfamethoxazole-trimethoprim (Bactrim DS) 800-160 MG per tablet, Take 1 tablet by mouth 2 (Two) Times a Day for 7 days., Disp: 14 tablet, Rfl: 0    Allergies:  No Known Allergies    Family History   Problem Relation Age of Onset   • Irritable bowel syndrome Mother    • Heart disease Father    • Diabetes Father    • No Known Problems Sister    • Cancer Brother    • Diabetes Brother    • Esophageal cancer Brother    • No Known Problems Brother    • No Known Problems Brother    • No Known Problems Brother    • No Known Problems Brother    • Malig Hyperthermia Neg Hx        Objective  PHYSICAL EXAMINATION:   Vitals:    12/29/23 0956   BP: 130/78   Pulse: 68   Resp: 17   SpO2: 100%     ECOG 0 - Asymptomatic  General: NAD, well appearing  Psych: a&o x3, normal mood and affect  Eyes: EOMI, no scleral icterus  ENMT: neck supple without masses or thyromegaly, mucous membranes moist  MSK: normal gait, normal ROM in bilateral shoulders  Lymph nodes: no cervical, supraclavicular or  axillary lymphadenopathy  Breast:   Right: Sp mastopexy with well-healing incisions.  There is erythema surrounding the NAC and inferior flaps.  Left: Sp mastopexy with well-healing incisions.  There is erythema surrounding the NAC and inferior flaps.  This is symmetric on both sides      Assessment & Plan  Assessment:   63 y.o. F with a diagnosis of right breast ductal carcinoma in situ (DCIS), intermediate-high grade, ER/IL positive. She also had a diagnosis of right breast atypical ductal hyperplasia (ADH) and a left breast intraductal papilloma. She underwent right partial mastectomy with right breast excisional biopsy and left breast excisional biopsy on 12/19/23 with benign final pathology on the left and pTis on the right.  -She has a possible allergic reaction to the glue versus cellulitis.    Plan:  -I discussed her case on the phone with Dr. Berger today.  I prescribed Bactrim and a Medrol Dosepak.  He will follow-up with her next week.  -Medical oncology referral.  Appointment scheduled with Dr. Fritz on 1/18/2023.  -Radiation oncology referral.  Appointment scheduled with Dr. Shirley on 1/11/2023.  -Follow-up with me in 3 months for clinical exam.    Audelia Andino MD      CC:  MARINO Wilson

## 2023-12-29 NOTE — PROGRESS NOTES
[facility]     Referring Provider: MARINO Deng     Chief complaint: Postoperative visit      Subjective   HPI:   10/12/2023:  Ms. Becca Bansal is a 62 yo woman, seen at the request of MARINO eDng, for a new diagnosis of right breast atypical ductal hyperplasia.  She had not had a screening mammogram for 10 years, then in July of this year presented for a screening mammogram.  She was called back for multiple right breast findings.  Diagnostic imaging demonstrated 2 separate groups of calcifications and a right breast focal asymmetry without an ultrasound correlate.  She underwent biopsy of one of the groups of calcifications and this showed ADH.  The other group of calcifications and a focal asymmetry were placed in imaging surveillance.  Prior to this appointment, she underwent a breast MRI which showed an area of linear enhancement in the left breast. Her work-up is detailed in the breast history section below. Prior to the biopsy, she denies any breast lumps, pain, skin changes, or nipple discharge. She denies any prior history of abnormal mammograms or breast biopsies. She denies any family history of breast or ovarian cancer.     11/16/2023:  After her last visit, she underwent a right stereotactic biopsy which showed DCIS and a left MRI guided biopsy which showed an intraductal papilloma.  She returns today to discuss the results.       12/29/2023:  She underwent right partial mastectomy with right breast excisional biopsy and left breast excisional biopsy on 12/19/23. See surgery & pathology details below in oncologic history.  Unfortunately she developed an itchy rash on both breasts over the past few days.  She has been taking Benadryl to help with the itching.  She denies any fevers.      Oncology/Hematology History   Ductal carcinoma in situ (DCIS) of right breast   1/12/2023 Imaging    Screening MMG ([facility]):  The current examination  reveals no new abnormalities or suspicious changes in appearance.  BI-RADS 2     7/10/2023 Initial Diagnosis    Ductal carcinoma in situ (DCIS) of right breast     7/10/2023 Imaging    Screening MMG with Reggie ([facility]):   Right breast:  Focal asymmetry in the upper outer right breast middle 3rd depth with associated   grouped calcifications.  More anteriorly in the upper outer right breast is a smaller group of   calcifications.  Left breast:  Development of several course benign type left breast calcifications.  There are   developing vascular and secretory type calcifications visualized.  No architectural distortion or   suspicious mass.  BI-RADS 0: Incomplete     8/14/2023 Imaging    Right Diagnostic MMG with Reggie & Right Breast US ([facility]):  MMG:  Right diagnostic mammogram:      There are subtle punctate and fine pleomorphic calcifications within the outer aspect of the right   breast which appears new from the prior examination.   There are punctate, round, and coarse heterogeneous calcifications which appear to be along a   linear distribution associated with a vessel.  These correspond to the upper outer aspect of the   right breast.  There is a subtle focal asymmetry within the upper-outer quadrant of the right breast on current   examination which appears fairly similar to the prior more remote examination from 2013 when   accounting for differences in technique.  US:  Sonographic grayscale and color Doppler images of the right breast were obtained with   representative examples submitted to PACs for interpretation.  Imaging at the 11 o'clock position   corresponding to the focal asymmetry within the upper-outer quadrant of the right breast   demonstrates no sonographic correlate.  There are incidental intramammary lymph nodes at the 11   o'clock position 13 cm from the nipple.   IMPRESSION:  1. Subtle punctate and fine pleomorphic calcifications within the outer aspect of the right breast   which appear  new from prior examination.  Recommend right breast stereotactic biopsy for definitive   tissue diagnosis.  2. Additional calcifications appear linear in distribution and appear to correlate to a small   vessel.  These are considered probably benign and attention on follow-up right diagnostic mammogram   6 months is recommended.  3. Right breast focal asymmetry appears like the more remote prior examination from 2013 when   accounting for differences in technique.  No sonographic correlate was identified.  This could also   be re-evaluated on the follow-up right diagnostic mammogram in 6 months.    BI-RADS 4: Suspicious     8/21/2023 Biopsy    Right Breast, Stereotactic Biopsy ( [facility]):  Right breast, outer, stereotactic-guided core biopsy:               - Atypical ductal hyperplasia (ADH) involving an intraductal papilloma  - Usual ductal hyperplasia (UDH)  - Columnar cell change  - Microcalcifications   Comment:  The above diagnosis was rendered in expert consultation by Mery Sarah MD , [facility] .  -Hydrocoil clip.     10/7/2023 Imaging    Bilateral Breast MRI ( [facility]):  In the middle third of the lateral aspect of the right breast at the 9 o'clock position centered on the order of 6.5 cm from the nipple there is a focal signal void seen at the inferior margin of non-mass enhancement that measures on the order of 1.4 cm in the anterior to posterior dimension, 0.8 cm in the superior to inferior dimension and 0.7 cm in the medial to lateral dimension. This corresponds to the biopsy-proven site of ADH with the coil-shaped metallic clip.   No other areas of suspicious enhancement or morphology are seen in the right breast. I see no evidence for abnormal right breast skin, nipple or chest wall enhancement and there is no evidence for right axillary or internal mammary chain adenopathy.  In the anterior one-third of the lateral aspect of the left breast centered at the 3:30 position on the order of  4 cm from the nipple there is linear enhancement that measures on the order of 1.5 cm in the anterior to posterior dimension, 0.6 cm in the superior to inferior dimension and 0.7 cm in the medial to lateral dimension. No definite mammographic correlate is appreciated.   No areas of abnormal enhancement or morphology are otherwise seen in the left breast. I see no evidence for abnormal left breast skin, nipple or chest wall enhancement and there is no evidence for left axillary or internal mammary chain adenopathy.  BIRADS 4: Suspicious.      10/27/2023 Imaging    Additional MRI Images ([facility]):  Additional images were taken of the left breast pre and post intravenous administration of gadolinium to evaluate an area of linear enhancement and determine if the area showed features of a vessel.   Following additional acquisition of the images the area of linear enhancement does not show evidence of being a vessel. It is not reliably attached to a vessel or branching structure. Therefore, continued biopsy  of the area of linear enhancement in the left breast at the 3:30 position is recommended.  BI-RADS Category 4: Suspicious.     11/14/2023 Biopsy    Left Breast MR-Guided Biopsy & Right Breast Stereotactic Biopsy ([facility]):    1.  Left breast, 3:00, MRI-guided biopsies for linear enhancement:               -Intraductal papilloma, 2 mm in greatest extent, present as fragments in 2 of 19 total cores.    -No atypical hyperplasia, in situ nor invasive carcinoma identified.  -Stoplight clip.     2.  Right breast, 11:00, stereotactic biopsies for calcifications: INTERMEDIATE GRADE DUCTAL CARCINOMA IN SITU (DCIS).               -Architectural patterns: Solid and cribriform with central necrosis and associated microcalcifications.               -DCIS is present in a single tissue core measuring up to 7 mm maximally.               -Additional findings include fibroadenomatoid hyperplasia.               -No evidence of in situ  carcinoma identified.  -Bowtie clip.     ER+ (%, strong)  NV+ (%, strong)  Ki-67 12%     12/19/2023 Surgery    Left breast needle-localized excisional biopsy, right breast needle-localized excisional biopsy, and right needle-localized partial mastectomy with bilateral oncoplastic reduction    1. Left Breast, Needle Localized Excisional Biopsy (18 G):               A. Breast parenchyma with micropapilloma and usual ductal hyperplasia.               B. Clip and biopsy site changes are present.   C. Unremarkable skin.                 2. Right Breast, 10:00, Needle Localized Excisional Biopsy (20 G):               A. Breast parenchyma with focal atypical ductal hyperplasia.               B. Clip and biopsy site changes are present.               C. Unremarkable skin.     3. Right Breast, 10:00, Additional Superior and Medial Margins, Reexcision:               A. Breast parenchyma with no significant histopathologic changes       (additional 11 mm of benign tissue).     4. Right Breast, 10:00, Additional Posterior Margin, Reexcision:               A. Breast parenchyma with usual ductal hyperplasia involving an intraductal papilloma       (additional 8 mm of benign tissue).     5. Right Breast, 10:00, Additional Lateral and Inferior Margins, Reexcision:               A. Breast parenchyma with no significant histopathologic changes       (additional 10 mm of benign tissue).     6. Right Breast, 12:00, Needle Localized Partial Mastectomy (37 G):               A. INTERMEDIATE TO HIGH-GRADE DUCTAL CARCINOMA IN SITU (DCIS):  1. Solid and cribriform types with associated necrosis and calcifications.  2. Extent of DCIS: 10 mm (based on slices involved).  3. Margins are negative for in situ carcinoma; closest distance: DCIS is present 4 mm from the       inferior margin (superseded by specimen #7).  B. Clip and biopsy site changes are present adjacent to DCIS.  C. See synoptic report.     7. Right Breast, 12:00,  Additional Inferior and Lateral Margins, Reexcision:               A. Breast parenchyma with no significant histopathologic changes       (additional 8 mm of tissue free of carcinoma).     8. Right Breast, 12:00, Additional Anterior, Superior, and Medial Margins, Reexcision:               A. Breast parenchyma with no significant histopathologic changes       (additional 8 mm of tissue free of carcinoma).     9. Left Breast Tissue, Reduction Mammoplasty (536 G):               A. Benign skin and breast parenchyma with no significant histopathologic changes.     10. Right Breast Tissue, Reduction Mammoplasty (613 G):   A. Benign skin and breast parenchyma with no significant histopathologic changes.         Review of Systems:  See interval history.      Medications:    Current Outpatient Medications:     acetaminophen (TYLENOL) 325 MG tablet, Take 2 tablets by mouth Every 4 (Four) Hours As Needed for Mild Pain., Disp: 60 tablet, Rfl: 0    atorvastatin (LIPITOR) 10 MG tablet, Take 1 tablet by mouth Every Night., Disp: , Rfl:     azelastine (ASTELIN) 0.1 % nasal spray, 1 spray As Needed., Disp: , Rfl:     benzonatate (TESSALON) 200 MG capsule, Take 1 capsule by mouth As Needed. (Patient not taking: Reported on 10/25/2023), Disp: , Rfl:     Cannabidiol 100 MG/ML solution, Take 1 mg by mouth Daily. CBD OIL, HAS BEEN OFF FOR 2 WEEKS., Disp: , Rfl:     Cholecalciferol (Vitamin D3) 1.25 MG (78319 UT) tablet, Take 1 tablet by mouth 2 (Two) Times a Week. NIGHT ON Sunday AND THURSDAY, Disp: , Rfl:     CINNAMON PO, Take 1 tablet by mouth Daily. HOLD ONE WEEK FOR SURGERY, Disp: , Rfl:     dapagliflozin Propanediol (Farxiga) 10 MG tablet, Take 10 mg by mouth Daily., Disp: , Rfl:     lisinopril-hydrochlorothiazide (PRINZIDE,ZESTORETIC) 20-25 MG per tablet, Take 1 tablet by mouth Daily., Disp: , Rfl:     loratadine (CLARITIN) 10 MG tablet, Take 1 tablet by mouth Daily., Disp: , Rfl:     methylPREDNISolone (MEDROL) 4 MG dose pack,  Take as directed on package instructions., Disp: 21 tablet, Rfl: 0    montelukast (SINGULAIR) 10 MG tablet, Take 1 tablet by mouth Every Night., Disp: , Rfl:     Multiple Vitamins-Minerals (Multi Adult Gummies) chewable tablet, Chew 1 tablet Daily. HOLD FOR ONE WEEK FOR SURGERY, Disp: , Rfl:     omeprazole (priLOSEC) 20 MG capsule, 1 capsule every night at bedtime., Disp: , Rfl:     sulfamethoxazole-trimethoprim (Bactrim DS) 800-160 MG per tablet, Take 1 tablet by mouth 2 (Two) Times a Day for 7 days., Disp: 14 tablet, Rfl: 0    Allergies:  No Known Allergies    Family History   Problem Relation Age of Onset    Irritable bowel syndrome Mother     Heart disease Father     Diabetes Father     No Known Problems Sister     Cancer Brother     Diabetes Brother     Esophageal cancer Brother     No Known Problems Brother     No Known Problems Brother     No Known Problems Brother     No Known Problems Brother     Malig Hyperthermia Neg Hx        Objective   PHYSICAL EXAMINATION:   Vitals:    12/29/23 0956   BP: 130/78   Pulse: 68   Resp: 17   SpO2: 100%     ECOG 0 - Asymptomatic  General: NAD, well appearing  Psych: a&o x3, normal mood and affect  Eyes: EOMI, no scleral icterus  ENMT: neck supple without masses or thyromegaly, mucous membranes moist  MSK: normal gait, normal ROM in bilateral shoulders  Lymph nodes: no cervical, supraclavicular or axillary lymphadenopathy  Breast:   Right: Sp mastopexy with well-healing incisions.  There is erythema surrounding the NAC and inferior flaps.  Left: Sp mastopexy with well-healing incisions.  There is erythema surrounding the NAC and inferior flaps.  This is symmetric on both sides      Assessment & Plan   Assessment:   63 y.o. F with a diagnosis of right breast ductal carcinoma in situ (DCIS), intermediate-high grade, ER/NH positive. She also had a diagnosis of right breast atypical ductal hyperplasia (ADH) and a left breast intraductal papilloma. She underwent right partial  mastectomy with right breast excisional biopsy and left breast excisional biopsy on 12/19/23 with benign final pathology on the left and pTis on the right.  -She has a possible allergic reaction to the glue versus cellulitis.    Plan:  -I discussed her case on the phone with Dr. Berger today.  I prescribed Bactrim and a Medrol Dosepak.  He will follow-up with her next week.  -Medical oncology referral.  Appointment scheduled with Dr. Fritz on 1/18/2023.  -Radiation oncology referral.  Appointment scheduled with Dr. Shirley on 1/11/2023.  -Follow-up with me in 3 months for clinical exam.    Audelia Andino MD      CC:  MARINO Wilson

## 2024-01-08 ENCOUNTER — CONSULT (OUTPATIENT)
Dept: RADIATION ONCOLOGY | Facility: HOSPITAL | Age: 64
End: 2024-01-08
Payer: MEDICARE

## 2024-01-08 VITALS
OXYGEN SATURATION: 95 % | HEART RATE: 84 BPM | WEIGHT: 215.83 LBS | TEMPERATURE: 97 F | SYSTOLIC BLOOD PRESSURE: 144 MMHG | DIASTOLIC BLOOD PRESSURE: 78 MMHG | RESPIRATION RATE: 18 BRPM | BODY MASS INDEX: 38.82 KG/M2

## 2024-01-08 DIAGNOSIS — D05.11 DUCTAL CARCINOMA IN SITU (DCIS) OF RIGHT BREAST: Primary | ICD-10-CM

## 2024-01-08 PROCEDURE — G0463 HOSPITAL OUTPT CLINIC VISIT: HCPCS | Performed by: RADIOLOGY

## 2024-01-18 ENCOUNTER — CONSULT (OUTPATIENT)
Dept: ONCOLOGY | Facility: CLINIC | Age: 64
End: 2024-01-18
Payer: MEDICARE

## 2024-01-18 ENCOUNTER — LAB (OUTPATIENT)
Dept: LAB | Facility: HOSPITAL | Age: 64
End: 2024-01-18
Payer: MEDICARE

## 2024-01-18 VITALS
RESPIRATION RATE: 18 BRPM | WEIGHT: 216.1 LBS | HEART RATE: 94 BPM | OXYGEN SATURATION: 96 % | BODY MASS INDEX: 38.29 KG/M2 | DIASTOLIC BLOOD PRESSURE: 75 MMHG | TEMPERATURE: 97.7 F | HEIGHT: 63 IN | SYSTOLIC BLOOD PRESSURE: 129 MMHG

## 2024-01-18 DIAGNOSIS — R79.89 ABNORMAL CBC: Primary | ICD-10-CM

## 2024-01-18 DIAGNOSIS — D05.11 DUCTAL CARCINOMA IN SITU (DCIS) OF RIGHT BREAST: ICD-10-CM

## 2024-01-18 DIAGNOSIS — N60.99 ATYPICAL DUCTAL HYPERPLASIA OF BREAST: ICD-10-CM

## 2024-01-18 DIAGNOSIS — M81.0 OSTEOPOROSIS, UNSPECIFIED OSTEOPOROSIS TYPE, UNSPECIFIED PATHOLOGICAL FRACTURE PRESENCE: Primary | ICD-10-CM

## 2024-01-18 LAB
BASOPHILS # BLD AUTO: 0.04 10*3/MM3 (ref 0–0.2)
BASOPHILS NFR BLD AUTO: 0.4 % (ref 0–1.5)
DEPRECATED RDW RBC AUTO: 47 FL (ref 37–54)
EOSINOPHIL # BLD AUTO: 0.24 10*3/MM3 (ref 0–0.4)
EOSINOPHIL NFR BLD AUTO: 2.6 % (ref 0.3–6.2)
ERYTHROCYTE [DISTWIDTH] IN BLOOD BY AUTOMATED COUNT: 15.3 % (ref 12.3–15.4)
HCT VFR BLD AUTO: 40 % (ref 34–46.6)
HGB BLD-MCNC: 12.9 G/DL (ref 12–15.9)
IMM GRANULOCYTES # BLD AUTO: 0.18 10*3/MM3 (ref 0–0.05)
IMM GRANULOCYTES NFR BLD AUTO: 2 % (ref 0–0.5)
LYMPHOCYTES # BLD AUTO: 2.13 10*3/MM3 (ref 0.7–3.1)
LYMPHOCYTES NFR BLD AUTO: 23.2 % (ref 19.6–45.3)
MCH RBC QN AUTO: 27.6 PG (ref 26.6–33)
MCHC RBC AUTO-ENTMCNC: 32.3 G/DL (ref 31.5–35.7)
MCV RBC AUTO: 85.5 FL (ref 79–97)
MONOCYTES # BLD AUTO: 0.73 10*3/MM3 (ref 0.1–0.9)
MONOCYTES NFR BLD AUTO: 7.9 % (ref 5–12)
NEUTROPHILS NFR BLD AUTO: 5.87 10*3/MM3 (ref 1.7–7)
NEUTROPHILS NFR BLD AUTO: 63.9 % (ref 42.7–76)
NRBC BLD AUTO-RTO: 0.4 /100 WBC (ref 0–0.2)
PLATELET # BLD AUTO: 247 10*3/MM3 (ref 140–450)
PMV BLD AUTO: 9.7 FL (ref 6–12)
RBC # BLD AUTO: 4.68 10*6/MM3 (ref 3.77–5.28)
WBC NRBC COR # BLD AUTO: 9.19 10*3/MM3 (ref 3.4–10.8)

## 2024-01-18 PROCEDURE — 85025 COMPLETE CBC W/AUTO DIFF WBC: CPT

## 2024-01-18 PROCEDURE — 36415 COLL VENOUS BLD VENIPUNCTURE: CPT

## 2024-01-18 RX ORDER — EXEMESTANE 25 MG/1
25 TABLET ORAL DAILY
Qty: 30 TABLET | Refills: 3 | Status: SHIPPED | OUTPATIENT
Start: 2024-01-18 | End: 2024-04-17

## 2024-01-19 PROBLEM — N60.99 ATYPICAL DUCTAL HYPERPLASIA OF BREAST: Status: ACTIVE | Noted: 2023-10-12

## 2024-01-19 NOTE — PROGRESS NOTES
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    HISTORY OF PRESENT ILLNESS:  The patient is a 63 y.o. year old female who is here for an opinion about the above issue.    History of Present Illness     Patient is a 63-year-old female was referred here by Dr. Audelia Andino for new diagnosis of atypical ductal hyperplasia.  Patient has been noncompliant with her screening mammograms for 10 years after which she presented for screening mammogram the screening mammogram showed abnormality and hence diagnostic imaging was done which showed 2 separate groups of calcifications in the right breast focal asymmetry without an ultrasound correlate.  She then had to undergo biopsy which showed atypical ductal hyperplasia.        The details are as follows    July 10, 2023 screening mammogram at [facility] showed    Right breast:  Focal asymmetry in the upper outer right breast middle 3rd depth with associated   grouped calcifications.  More anteriorly in the upper outer right breast is a smaller group of   calcifications.  Left breast:  Development of several course benign type left breast calcifications.  There are   developing vascular and secretory type calcifications visualized.  No architectural distortion or   suspicious mass.    August 14, 2023: Right breast diagnostic mammogram and ultrasound    IMPRESSION:  1. Subtle punctate and fine pleomorphic calcifications within the outer aspect of the right breast   which appear new from prior examination.  Recommend right breast stereotactic biopsy for definitive    tissue diagnosis.  2. Additional calcifications appear linear in distribution and appear to correlate to a small   vessel.  These are considered probably benign and attention on follow-up right diagnostic mammogram   6 months is recommended.  3. Right breast focal asymmetry appears like the more remote prior examination from 2013 when   accounting for differences in technique.  No sonographic correlate was identified.  This could also   be re-evaluated on the follow-up right diagnostic mammogram in 6 months.    BI-RADS 4: Suspicious    August 21, 2023: Right breast stereotactic biopsy at [facility]    Right breast, outer, stereotactic-guided core biopsy:               - Atypical ductal hyperplasia (ADH) involving an intraductal papilloma  - Usual ductal hyperplasia (UDH)  - Columnar cell change      October 7, 2023: Bilateral breast MRI    In the middle third of the lateral aspect of the right breast at the 9 o'clock position centered on the order of 6.5 cm from the nipple there is a focal signal void seen at the inferior margin of non-mass enhancement that measures on the order of 1.4 cm in the anterior to posterior dimension, 0.8 cm in the superior to inferior dimension and 0.7 cm in the medial to lateral dimension. This corresponds to the biopsy-proven site of ADH with the coil-shaped metallic clip.   No other areas of suspicious enhancement or morphology are seen in the right breast. I see no evidence for abnormal right breast skin, nipple or chest wall enhancement and there is no evidence for right axillary or internal mammary chain adenopathy.  In the anterior one-third of the lateral aspect of the left breast centered at the 3:30 position on the order of 4 cm from the nipple there is linear enhancement that measures on the order of 1.5 cm in the anterior to posterior dimension, 0.6 cm in the superior to inferior dimension and 0.7 cm in the medial to lateral dimension. No definite mammographic  correlate is appreciated.   No areas of abnormal enhancement or morphology are otherwise seen in the left breast. I see no evidence for abnormal left breast skin, nipple or chest wall enhancement and there is no evidence for left axillary or internal mammary chain adenopathy.  BIRADS 4: Suspicious.     October 27, 2023: Additional MRI imaging at [facility]  Additional images were taken of the left breast pre and post intravenous administration of gadolinium to evaluate an area of linear enhancement and determine if the area showed features of a vessel.   Following additional acquisition of the images the area of linear enhancement does not show evidence of being a vessel. It is not reliably attached to a vessel or branching structure. Therefore, continued biopsy  of the area of linear enhancement in the left breast at the 3:30 position is recommended.  BI-RADS Category 4: Suspicious.    November 14, 2023: Left breast MRI guided biopsy and right breast stereotactic biopsy    Left Breast MR-Guided Biopsy & Right Breast Stereotactic Biopsy ([facility]):     1.  Left breast, 3:00, MRI-guided biopsies for linear enhancement:               -Intraductal papilloma, 2 mm in greatest extent, present as fragments in 2 of 19 total cores.    -No atypical hyperplasia, in situ nor invasive carcinoma identified.  -Stoplight clip.     2.  Right breast, 11:00, stereotactic biopsies for calcifications: INTERMEDIATE GRADE DUCTAL CARCINOMA IN SITU (DCIS).               -Architectural patterns: Solid and cribriform with central necrosis and associated microcalcifications.               -DCIS is present in a single tissue core measuring up to 7 mm maximally.               -Additional findings include fibroadenomatoid hyperplasia.               -No evidence of in situ carcinoma identified.  -Bowtie clip.      ER+ (%, strong)  [place]+ (%, strong)  Ki-67 12%      December 19, 2023: Left breast localized excisional biopsy, right  breast needle localized excisional biopsy and right needle localized partial mastectomy with bilateral oncoplastic reduction    Synoptic Checklist   DCIS OF THE BREAST: Resection   8th Edition - Protocol posted: 3/23/2022DCIS OF THE BREAST: RESECTION - 6, 7, 8  SPECIMEN   Procedure  Excision (less than total mastectomy)   Specimen Laterality  Right   TUMOR   Tumor Site  Clock position     12 o'clock   Histologic Type  Ductal carcinoma in situ   Size (Extent) of DCIS  Estimated size (extent) of DCIS is at least (Millimeters): 10 mm   Architectural Patterns  Cribriform     Solid   Nuclear Grade  Grade III (high)   Necrosis  Present, focal (small foci or single cell necrosis)   Microcalcifications  Present in DCIS   MARGINS   Margin Status  All margins negative for DCIS   Distance from DCIS to Closest Margin  12 mm   Closest Margin(s) to DCIS  Inferior   Distance from DCIS to Anterior Margin  13 mm   Distance from DCIS to Posterior Margin  15 mm   Distance from DCIS to Superior Margin  20 mm   Distance from DCIS to Inferior Margin  12 mm   Distance from DCIS to Medial Margin  23 mm   Distance from DCIS to Lateral Margin  18 mm   REGIONAL LYMPH NODES   Regional Lymph Node Status  Not applicable (no regional lymph nodes submitted or found)   PATHOLOGIC STAGE CLASSIFICATION (pTNM, AJCC 8th Edition)   Reporting of pT, pN, and (when applicable) pM categories is based on information available to the pathologist at the time the report is issued. As per the AJCC (Chapter 1, 8th Ed.) it is the managing physician’s responsibility to establish the final pathologic stage based upon all pertinent information, including but potentially not limited to this pathology report.   pT Category  pTis (DCIS)   pN Category  pN not assigned (no nodes submitted or found)   Breast Biomarker Testing Performed on Previous Biopsy     Estrogen Receptor (ER) Status  Positive   Percentage of Cells with Nuclear Positivity  %   Breast Biomarker  Testing Performed on Previous Biopsy     Progesterone Receptor (PgR) Status  Positive   Percentage of Cells with Nuclear Positivity  %   Comment(s)  [id number]          Past Medical History:   Diagnosis Date    Acid reflux     Aneurysm of anterior communicating artery     Arthritis     Bilateral foot pain 06/04/2021    Brain aneurysm 2020    SEES NEUROLOGIST/ONELIA    Chest pain     HISTORY OF    Chronic kidney disease     COVID-19 11/09/2023    DCIS (ductal carcinoma in situ) of breast     RIGHT BREAST    Diabetes mellitus     PREDIABETIC    GERD (gastroesophageal reflux disease)     Heel pain     History of snoring     HLD (hyperlipidemia)     HTN (hypertension)     Intracranial aneurysm     Intraductal papilloma     LEFT BREAST    Prediabetes     Redness     LEFT ANKLE    RLS (restless legs syndrome)     Seasonal allergies     Sleep apnea     NO CPAP    SOB (shortness of breath)     HISTORY OF, POST ANEURYSM    Vertigo         Past Surgical History:   Procedure Laterality Date    BREAST BIOPSY Right 08/21/2023    Atypical Ductal Hyperplasia     ([facility])    BREAST BIOPSY Bilateral     NOVEMBER 2023    BREAST LUMPECTOMY Right 12/19/2023    Procedure: Right needle localized partial mastectomy and right breast needle localized excisional biopsy, Left breast needle-localized excisional biopsy;  Surgeon: Audelia Andino MD;  Location: [facility];  Service: General;  Laterality: Right;    BREAST SURGERY Bilateral 12/19/2023    Procedure: BILATERAL ONCOPLASTIC REDUCTION/MASTOPEXY;  Surgeon: Fahad Berger MD;  Location: [facility];  Service: Plastics;  Laterality: Bilateral;    CARDIAC CATHETERIZATION      CEREBRAL ANGIOGRAM  2020    Brain aneurysm    CEREBRAL EMBOLIZATION  2019    COLONOSCOPY  2011    COLONOSCOPY N/A 02/01/2023    Procedure: COLONOSCOPY;  Surgeon: Alice Guerra MD;  Location: [facility] ENDOSCOPY;  Service: Gastroenterology;  Laterality: N/A;  HEMORRHOIDS     ENDOSCOPY N/A 02/01/2023    Procedure: ESOPHAGOGASTRODUODENOSCOPY WITH BIOPSIES, ESOPHAGEAL BALLOON DILATATION 15-18MM AND 18-20MM;  Surgeon: Alice Guerra MD;  Location: [facility] ENDOSCOPY;  Service: Gastroenterology;  Laterality: N/A;  GASTRIC POLYPS, SCHATZKI'S RING, GASTRITIS, HIATAL HERNIA    UPPER GASTROINTESTINAL ENDOSCOPY  2011        Current Outpatient Medications on File Prior to Visit   Medication Sig Dispense Refill    acetaminophen (TYLENOL) 325 MG tablet Take 2 tablets by mouth Every 4 (Four) Hours As Needed for Mild Pain. 60 tablet 0    atorvastatin (LIPITOR) 10 MG tablet Take 1 tablet by mouth Every Night.      azelastine (ASTELIN) 0.1 % nasal spray 1 spray As Needed.      Cannabidiol 100 MG/ML solution Take 1 mg by mouth Daily.      Cholecalciferol (Vitamin D3) 1.25 MG (65147 UT) tablet Take 1 tablet by mouth 2 (Two) Times a Week. NIGHT ON Sunday AND THURSDAY      CINNAMON PO Take 1 tablet by mouth Daily. HOLD ONE WEEK FOR SURGERY      dapagliflozin Propanediol (Farxiga) 10 MG tablet Take 10 mg by mouth Daily.      lisinopril-hydrochlorothiazide (PRINZIDE,ZESTORETIC) 20-25 MG per tablet Take 1 tablet by mouth Daily.      loratadine (CLARITIN) 10 MG tablet Take 1 tablet by mouth Daily.      montelukast (SINGULAIR) 10 MG tablet Take 1 tablet by mouth Every Night.      Multiple Vitamins-Minerals (Multi Adult Gummies) chewable tablet Chew 1 tablet Daily. HOLD FOR ONE WEEK FOR SURGERY      omeprazole (priLOSEC) 20 MG capsule 1 capsule every night at bedtime.       No current facility-administered medications on file prior to visit.        ALLERGIES:  No Known Allergies     Social History     Socioeconomic History    Marital status: Single    Number of children: 0   Tobacco Use    Smoking status: Never    Smokeless tobacco: Never   Vaping Use    Vaping Use: Never used   Substance and Sexual Activity    Alcohol use: Not Currently     Comment: OCC    Drug use: Never    Sexual activity: Not Currently  "    Partners: Male        Family History   Problem Relation Age of Onset    Hypertension Mother     Irritable bowel syndrome Mother     Aneurysm Mother     Heart disease Father     Diabetes Father     No Known Problems Sister     Cancer Brother     Diabetes Brother     Esophageal cancer Brother     No Known Problems Brother     No Known Problems Brother     No Known Problems Brother     No Known Problems Brother     Aneurysm Maternal Aunt     Aneurysm Maternal Uncle     Cancer Maternal Uncle     Cancer Maternal Grandmother     Malig Hyperthermia Neg Hx         Review of Systems   Constitutional:  Negative for appetite change, chills, diaphoresis, fatigue, fever and unexpected weight change.   HENT:  Negative for hearing loss, sore throat and trouble swallowing.    Respiratory:  Negative for cough, chest tightness, shortness of breath and wheezing.    Cardiovascular:  Negative for chest pain, palpitations and leg swelling.   Gastrointestinal:  Negative for abdominal distention, abdominal pain, constipation, diarrhea, nausea and vomiting.   Genitourinary:  Negative for dysuria, frequency, hematuria and urgency.   Musculoskeletal:  Negative for joint swelling.        No muscle weakness.   Skin:  Negative for rash and wound.   Neurological:  Negative for seizures, syncope, speech difficulty, weakness, numbness and headaches.   Hematological:  Negative for adenopathy. Does not bruise/bleed easily.   Psychiatric/Behavioral:  Negative for behavioral problems, confusion and suicidal ideas.           Objective     Vitals:    01/18/24 1408   BP: 129/75   Pulse: 94   Resp: 18   Temp: 97.7 °F (36.5 °C)   TempSrc: Temporal   SpO2: 96%   Weight: 98 kg (216 lb 1.6 oz)   Height: 158.8 cm (62.52\")   PainSc: 0-No pain         1/18/2024     2:00 PM   Current Status   ECOG score 0       Physical Exam           This patient's ACP documentation is up to date, and there's nothing further left to document.      CONSTITUTIONAL:  Vital signs " reviewed.  No distress, looks comfortable.  RESPIRATORY:  Normal respiratory effort.  Lungs clear to auscultation bilaterally.  BREAST: Right breast: No skin changes, no evidence of breast mass, no nipple discharge, no evidence of any right axillary adenopathy or right supraclavicular adenopathy  Left breast: No evidence of any skin changes, no evidence of any left breast mass and no evidence of left nipple discharge as well as no left axillary adenopathy or left supraclavicular adenopathy.   CARDIOVASCULAR:  Normal S1, S2.  No murmurs rubs or gallops.  No significant lower extremity edema.  GASTROINTESTINAL: Abdomen appears unremarkable.  Nontender.  No hepatomegaly.  No splenomegaly.  LYMPHATIC:  No cervical, supraclavicular, axillary lymphadenopathy.  SKIN:  Warm.  No rashes.  PSYCHIATRIC:  Normal judgment and insight.  Normal mood and affect.    RECENT LABS:  Hematology WBC   Date Value Ref Range Status   01/18/2024 9.19 3.40 - 10.80 10*3/mm3 Final   11/30/2020 6.33 4.5 - 11.0 10*3/uL Final     RBC   Date Value Ref Range Status   01/18/2024 4.68 3.77 - 5.28 10*6/mm3 Final   11/30/2020 4.58 4.0 - 5.2 10*6/uL Final     Hemoglobin   Date Value Ref Range Status   01/18/2024 12.9 12.0 - 15.9 g/dL Final   11/30/2020 12.0 12.0 - 16.0 g/dL Final     Hematocrit   Date Value Ref Range Status   01/18/2024 40.0 34.0 - 46.6 % Final   11/30/2020 38.6 36.0 - 46.0 % Final     Platelets   Date Value Ref Range Status   01/18/2024 247 140 - 450 10*3/mm3 Final   11/30/2020 270 140 - 440 10*3/uL Final          Assessment & Plan     #.  Pathologic Tis Nx DCIS of the right breast, 10 mm, grade 3, ER positive IA positive s/p right partial mastectomy December 22, 2023, negative margins.  Patient also has atypical ductal hyperplasia of the right breast and the left intraductal papilloma by biopsy.    Patient is referred here for evaluation of chemoprophylaxis.  Given ER/IA positive DCIS and being menopausal we will start  exemestane  Tamoxifen causes hot flashes, rare chance for uterine cancer, blood clots, DVT, PE, hair thinning, rare chance for thrombocytopenia, cataracts.  Aromatase inhibitors can cause arthralgias, hot flashes, decrease in bone density, rare chance for diarrhea, also discussed about hot flashes with it.  Evista is approved for osteopenia and can also be used for prophylaxis with fewer side effects except hot flashes and rare chance for cataracts but it can improve bone density.    Patient will require bone density  Patient has been referred to radiation oncology,  If patient decides to go on radiation then she can start exemestane at the end of radiation.    Plan  Reviewed all of her imaging studies and pathology  Discussed in length with patient and patient's sister .  Patient okay with her sister being there  Patient is eligible for endocrine therapy  Discussed in length side effects of endocrine therapy  Patient is still deciding to see if she wants radiation and will discuss with radiation oncologist  If she decides to start radiation then she can start exemestane at the end of radiation  Obtain DEXA scan  Follow-up with me in 3 months with labs at which time we will assess toxicity    Leslie Fritz MD     I spent 60 total minutes, face-to-face, caring for Becca cunningham. Greater than 50% of this time involved counseling and/or coordination of care as documented within this note.         Dr. Audelia Andino

## 2024-02-13 ENCOUNTER — HOSPITAL ENCOUNTER (OUTPATIENT)
Dept: BONE DENSITY | Facility: HOSPITAL | Age: 64
Discharge: HOME | End: 2024-02-13
Admitting: INTERNAL MEDICINE
Payer: MEDICARE

## 2024-02-13 DIAGNOSIS — M81.0 OSTEOPOROSIS, UNSPECIFIED OSTEOPOROSIS TYPE, UNSPECIFIED PATHOLOGICAL FRACTURE PRESENCE: ICD-10-CM

## 2024-02-13 PROCEDURE — 77080 DXA BONE DENSITY AXIAL: CPT

## 2024-03-26 ENCOUNTER — PATIENT OUTREACH (OUTPATIENT)
Dept: OTHER | Facility: HOSPITAL | Age: 64
End: 2024-03-26
Payer: MEDICARE

## 2024-03-26 NOTE — PROGRESS NOTES
Called Ms. Bansal to see how she was doing. Left a message with my contact information and asked her to call back at her convenience.        Ms. Bansal called back and we discussed radiation questions and concerns. She stated she had a second opinion at the [facility] and decided not to pursue radiation. She will start taking hormone blocking medication and will follow with Dr. Fritz regularly. She has no resource or support needs at this time. We discussed our survivorship program and will mail out more information. She was thankful for the help and will reach out if any needs arise.

## 2024-03-27 ENCOUNTER — OFFICE VISIT (OUTPATIENT)
Dept: SURGERY | Facility: CLINIC | Age: 64
End: 2024-03-27
Payer: MEDICARE

## 2024-03-27 VITALS
HEIGHT: 63 IN | WEIGHT: 208 LBS | RESPIRATION RATE: 18 BRPM | HEART RATE: 78 BPM | DIASTOLIC BLOOD PRESSURE: 80 MMHG | OXYGEN SATURATION: 98 % | SYSTOLIC BLOOD PRESSURE: 142 MMHG | BODY MASS INDEX: 36.86 KG/M2

## 2024-03-27 DIAGNOSIS — Z12.31 ENCOUNTER FOR SCREENING MAMMOGRAM FOR MALIGNANT NEOPLASM OF BREAST: ICD-10-CM

## 2024-03-27 DIAGNOSIS — D05.11 DUCTAL CARCINOMA IN SITU (DCIS) OF RIGHT BREAST: Primary | ICD-10-CM

## 2024-03-27 NOTE — LETTER
March 27, 2024       No Recipients    Patient: Becca Bansal   YOB: 1960   Date of Visit: 3/27/2024     Dear MARINO Wilson:       Thank you for referring Becca Bansal to me for evaluation. Below are the relevant portions of my assessment and plan of care.    If you have questions, please do not hesitate to call me. I look forward to following Becca along with you.         Sincerely,        Audelia Andino MD        CC:   No Recipients    Audelia Andino MD  03/27/24 1521  Sign when Signing Visit  [facility]     Referring Provider: MARINO Deng     Chief complaint: Routine follow up DCIS     Subjective  HPI:   10/12/2023:  Ms. Becca Bansal is a 64 yo woman, seen at the request of MARINO Deng, for a new diagnosis of right breast atypical ductal hyperplasia.  She had not had a screening mammogram for 10 years, then in July of this year presented for a screening mammogram.  She was called back for multiple right breast findings.  Diagnostic imaging demonstrated 2 separate groups of calcifications and a right breast focal asymmetry without an ultrasound correlate.  She underwent biopsy of one of the groups of calcifications and this showed ADH.  The other group of calcifications and a focal asymmetry were placed in imaging surveillance.  Prior to this appointment, she underwent a breast MRI which showed an area of linear enhancement in the left breast. Her work-up is detailed in the breast history section below. Prior to the biopsy, she denies any breast lumps, pain, skin changes, or nipple discharge. She denies any prior history of abnormal mammograms or breast biopsies. She denies any family history of breast or ovarian cancer.     11/16/2023:  After her last visit, she underwent a right stereotactic biopsy which showed DCIS and a left MRI guided biopsy which showed an intraductal papilloma.  She returns today to discuss the results.        12/29/2023:  She underwent right partial mastectomy with right breast excisional biopsy and left breast excisional biopsy with bilateral oncoplastic reduction on 12/19/23. See surgery & pathology details below in oncologic history.  Unfortunately she developed an itchy rash on both breasts over the past few days.  She has been taking Benadryl to help with the itching.  She denies any fevers.    3/27/2024, Interval history:  She returns today for scheduled follow-up.  She saw Dr. Crocker in [place] who recommended radiation.  She then got a second opinion and it sounds like she may have had a DCISionRT test performed which suggested she was at low risk for recurrence (but I do not have these records).  Ultimately she decided against radiation. She has not started exemestane yet.  She noticed a nodular area near her right nipple.      Oncology/Hematology History   Ductal carcinoma in situ (DCIS) of right breast   1/12/2023 Imaging    Screening MMG ([facility]):  The current examination reveals no new abnormalities or suspicious changes in appearance.  BI-RADS 2     7/10/2023 Initial Diagnosis    Ductal carcinoma in situ (DCIS) of right breast     7/10/2023 Imaging    Screening MMG with Reggie ([facility]):   Right breast:  Focal asymmetry in the upper outer right breast middle 3rd depth with associated   grouped calcifications.  More anteriorly in the upper outer right breast is a smaller group of   calcifications.  Left breast:  Development of several course benign type left breast calcifications.  There are   developing vascular and secretory type calcifications visualized.  No architectural distortion or   suspicious mass.  BI-RADS 0: Incomplete     8/14/2023 Imaging    Right Diagnostic MMG with Reggie & Right Breast US ([facility]):  MMG:  Right diagnostic mammogram:      There are subtle punctate and fine pleomorphic calcifications within the outer aspect of the right   breast which appears  new from the prior examination.   There are punctate, round, and coarse heterogeneous calcifications which appear to be along a   linear distribution associated with a vessel.  These correspond to the upper outer aspect of the   right breast.  There is a subtle focal asymmetry within the upper-outer quadrant of the right breast on current   examination which appears fairly similar to the prior more remote examination from 2013 when   accounting for differences in technique.  US:  Sonographic grayscale and color Doppler images of the right breast were obtained with   representative examples submitted to PACs for interpretation.  Imaging at the 11 o'clock position   corresponding to the focal asymmetry within the upper-outer quadrant of the right breast   demonstrates no sonographic correlate.  There are incidental intramammary lymph nodes at the 11   o'clock position 13 cm from the nipple.   IMPRESSION:  1. Subtle punctate and fine pleomorphic calcifications within the outer aspect of the right breast   which appear new from prior examination.  Recommend right breast stereotactic biopsy for definitive   tissue diagnosis.  2. Additional calcifications appear linear in distribution and appear to correlate to a small   vessel.  These are considered probably benign and attention on follow-up right diagnostic mammogram   6 months is recommended.  3. Right breast focal asymmetry appears like the more remote prior examination from 2013 when   accounting for differences in technique.  No sonographic correlate was identified.  This could also   be re-evaluated on the follow-up right diagnostic mammogram in 6 months.    BI-RADS 4: Suspicious     8/21/2023 Biopsy    Right Breast, Stereotactic Biopsy ( [facility]):  Right breast, outer, stereotactic-guided core biopsy:               - Atypical ductal hyperplasia (ADH) involving an intraductal papilloma  - Usual ductal hyperplasia (UDH)  - Columnar cell change  -  Microcalcifications   Comment:  The above diagnosis was rendered in expert consultation by Mery Sarah MD , [facility] .  -Hydrocoil clip.     10/7/2023 Imaging    Bilateral Breast MRI ( [facility]):  In the middle third of the lateral aspect of the right breast at the 9 o'clock position centered on the order of 6.5 cm from the nipple there is a focal signal void seen at the inferior margin of non-mass enhancement that measures on the order of 1.4 cm in the anterior to posterior dimension, 0.8 cm in the superior to inferior dimension and 0.7 cm in the medial to lateral dimension. This corresponds to the biopsy-proven site of ADH with the coil-shaped metallic clip.   No other areas of suspicious enhancement or morphology are seen in the right breast. I see no evidence for abnormal right breast skin, nipple or chest wall enhancement and there is no evidence for right axillary or internal mammary chain adenopathy.  In the anterior one-third of the lateral aspect of the left breast centered at the 3:30 position on the order of 4 cm from the nipple there is linear enhancement that measures on the order of 1.5 cm in the anterior to posterior dimension, 0.6 cm in the superior to inferior dimension and 0.7 cm in the medial to lateral dimension. No definite mammographic correlate is appreciated.   No areas of abnormal enhancement or morphology are otherwise seen in the left breast. I see no evidence for abnormal left breast skin, nipple or chest wall enhancement and there is no evidence for left axillary or internal mammary chain adenopathy.  BIRADS 4: Suspicious.      10/27/2023 Imaging    [facility]  Additional images were taken of the left breast pre and post intravenous administration of gadolinium to evaluate an area of linear enhancement and determine if the area showed features of a vessel.   Following additional acquisition of the images the area of linear enhancement does not show  evidence of being a vessel. It is not reliably attached to a vessel or branching structure. Therefore, continued biopsy  of the area of linear enhancement in the left breast at the 3:30 position is recommended.  BI-RADS Category 4: Suspicious.     11/14/2023 Biopsy    Left Breast MR-Guided Biopsy & Right Breast Stereotactic Biopsy ([facility]):    1.  Left breast, 3:00, MRI-guided biopsies for linear enhancement:               -Intraductal papilloma, 2 mm in greatest extent, present as fragments in 2 of 19 total cores.    -No atypical hyperplasia, in situ nor invasive carcinoma identified.  -Stoplight clip.     2.  Right breast, 11:00, stereotactic biopsies for calcifications: INTERMEDIATE GRADE DUCTAL CARCINOMA IN SITU (DCIS).               -Architectural patterns: Solid and cribriform with central necrosis and associated microcalcifications.               -DCIS is present in a single tissue core measuring up to 7 mm maximally.               -Additional findings include fibroadenomatoid hyperplasia.               -No evidence of in situ carcinoma identified.  -Bowtie clip.     ER+ (%, strong)  AR+ (%, strong)  Ki-67 12%     12/19/2023 Surgery    Left breast needle-localized excisional biopsy, right breast needle-localized excisional biopsy, and right needle-localized partial mastectomy with bilateral oncoplastic reduction    1. Left Breast, Needle Localized Excisional Biopsy (18 G):               A. Breast parenchyma with micropapilloma and usual ductal hyperplasia.               B. Clip and biopsy site changes are present.   C. Unremarkable skin.                 2. Right Breast, 10:00, Needle Localized Excisional Biopsy (20 G):               A. Breast parenchyma with focal atypical ductal hyperplasia.               B. Clip and biopsy site changes are present.               C. Unremarkable skin.     3. Right Breast, 10:00, Additional Superior and Medial Margins, Reexcision:               A. Breast  parenchyma with no significant histopathologic changes       (additional 11 mm of benign tissue).     4. Right Breast, 10:00, Additional Posterior Margin, Reexcision:               A. Breast parenchyma with usual ductal hyperplasia involving an intraductal papilloma       (additional 8 mm of benign tissue).     5. Right Breast, 10:00, Additional Lateral and Inferior Margins, Reexcision:               A. Breast parenchyma with no significant histopathologic changes       (additional 10 mm of benign tissue).     6. Right Breast, 12:00, Needle Localized Partial Mastectomy (37 G):               A. INTERMEDIATE TO HIGH-GRADE DUCTAL CARCINOMA IN SITU (DCIS):  1. Solid and cribriform types with associated necrosis and calcifications.  2. Extent of DCIS: 10 mm (based on slices involved).  3. Margins are negative for in situ carcinoma; closest distance: DCIS is present 4 mm from the       inferior margin (superseded by specimen #7).  B. Clip and biopsy site changes are present adjacent to DCIS.  C. See synoptic report.     7. Right Breast, 12:00, Additional Inferior and Lateral Margins, Reexcision:               A. Breast parenchyma with no significant histopathologic changes       (additional 8 mm of tissue free of carcinoma).     8. Right Breast, 12:00, Additional Anterior, Superior, and Medial Margins, Reexcision:               A. Breast parenchyma with no significant histopathologic changes       (additional 8 mm of tissue free of carcinoma).     9. Left Breast Tissue, Reduction Mammoplasty (536 G):               A. Benign skin and breast parenchyma with no significant histopathologic changes.     10. Right Breast Tissue, Reduction Mammoplasty (613 G):   A. Benign skin and breast parenchyma with no significant histopathologic changes.         Review of Systems:  See interval history.      Medications:    Current Outpatient Medications:   •  atorvastatin (LIPITOR) 10 MG tablet, Take 1 tablet by mouth Every Night., Disp: ,  Rfl:   •  azelastine (ASTELIN) 0.1 % nasal spray, 1 spray As Needed., Disp: , Rfl:   •  Cannabidiol 100 MG/ML solution, Take 1 mg by mouth Daily., Disp: , Rfl:   •  Cholecalciferol (Vitamin D3) 1.25 MG (04092 UT) tablet, Take 1 tablet by mouth 2 (Two) Times a Week. NIGHT ON Sunday AND THURSDAY, Disp: , Rfl:   •  CINNAMON PO, Take 1 tablet by mouth Daily. HOLD ONE WEEK FOR SURGERY, Disp: , Rfl:   •  dapagliflozin Propanediol (Farxiga) 10 MG tablet, Take 10 mg by mouth Daily., Disp: , Rfl:   •  exemestane (Aromasin) 25 MG tablet, Take 1 tablet by mouth Daily for 90 days., Disp: 30 tablet, Rfl: 3  •  lisinopril-hydrochlorothiazide (PRINZIDE,ZESTORETIC) 20-25 MG per tablet, Take 1 tablet by mouth Daily., Disp: , Rfl:   •  loratadine (CLARITIN) 10 MG tablet, Take 1 tablet by mouth Daily., Disp: , Rfl:   •  montelukast (SINGULAIR) 10 MG tablet, Take 1 tablet by mouth Every Night., Disp: , Rfl:   •  Multiple Vitamins-Minerals (Multi Adult Gummies) chewable tablet, Chew 1 tablet Daily. HOLD FOR ONE WEEK FOR SURGERY, Disp: , Rfl:   •  omeprazole (priLOSEC) 20 MG capsule, 1 capsule every night at bedtime., Disp: , Rfl:     Allergies:  No Known Allergies    Family History   Problem Relation Age of Onset   • Hypertension Mother    • Irritable bowel syndrome Mother    • Aneurysm Mother    • Hypertension Father    • Heart disease Father    • Diabetes Father    • No Known Problems Sister    • Hypertension Brother    • Cancer Brother    • Diabetes Brother    • Esophageal cancer Brother    • No Known Problems Brother    • No Known Problems Brother    • No Known Problems Brother    • No Known Problems Brother    • Aneurysm Maternal Aunt    • Aneurysm Maternal Uncle    • Cancer Maternal Uncle    • Cancer Maternal Grandmother    • Malig Hyperthermia Neg Hx        Objective  PHYSICAL EXAMINATION:   Vitals:    03/27/24 1355   BP: 142/80   Pulse: 78   Resp: 18   SpO2: 98%   ECOG 0 - Asymptomatic  General: NAD, well appearing  Psych: a&o  x3, normal mood and affect  Eyes: EOMI, no scleral icterus  ENMT: neck supple without masses or thyromegaly, mucous membranes moist  MSK: normal gait, normal ROM in bilateral shoulders  Lymph nodes: no cervical, supraclavicular or axillary lymphadenopathy  Breast:   Right: Sp mastopexy with well-healed scars.  There is some periareolar nodularity, likely fat necrosis.  No nipple abnormalities.  Left: Sp mastopexy with well-healed scars.  No masses or nipple abnormalities.      Assessment & Plan  Assessment:   63 y.o. F with a diagnosis of right breast ductal carcinoma in situ (DCIS), intermediate-high grade, ER/[place] positive. She also had a diagnosis of right breast atypical ductal hyperplasia (ADH) and a left breast intraductal papilloma. She underwent right partial mastectomy with right breast excisional biopsy and left breast excisional biopsy with bilateral oncoplastic reduction on 12/19/23 with benign final pathology on the left and pTis on the right.  She declined adjuvant radiation.    Plan:  -Reassured her that the nodular area near her right nipple is likely evolving fat necrosis.  -Advised her to go ahead and start the exemestane.  -Continue follow-up with Dr. Fritz.  -Follow-up in 7/2024 with mammogram.    Audelia Andino MD      CC:  MARINO Wilson

## 2024-03-27 NOTE — PROGRESS NOTES
[facility]     Referring Provider: MARINO Deng     Chief complaint: Routine follow up DCIS     Subjective   HPI:   10/12/2023:  Ms. Becca Bansal is a 62 yo woman, seen at the request of MARINO Deng, for a new diagnosis of right breast atypical ductal hyperplasia.  She had not had a screening mammogram for 10 years, then in July of this year presented for a screening mammogram.  She was called back for multiple right breast findings.  Diagnostic imaging demonstrated 2 separate groups of calcifications and a right breast focal asymmetry without an ultrasound correlate.  She underwent biopsy of one of the groups of calcifications and this showed ADH.  The other group of calcifications and a focal asymmetry were placed in imaging surveillance.  Prior to this appointment, she underwent a breast MRI which showed an area of linear enhancement in the left breast. Her work-up is detailed in the breast history section below. Prior to the biopsy, she denies any breast lumps, pain, skin changes, or nipple discharge. She denies any prior history of abnormal mammograms or breast biopsies. She denies any family history of breast or ovarian cancer.     11/16/2023:  After her last visit, she underwent a right stereotactic biopsy which showed DCIS and a left MRI guided biopsy which showed an intraductal papilloma.  She returns today to discuss the results.       12/29/2023:  She underwent right partial mastectomy with right breast excisional biopsy and left breast excisional biopsy with bilateral oncoplastic reduction on 12/19/23. See surgery & pathology details below in oncologic history.  Unfortunately she developed an itchy rash on both breasts over the past few days.  She has been taking Benadryl to help with the itching.  She denies any fevers.    3/27/2024, Interval history:  She returns today for scheduled follow-up.  She saw Dr. Crocker in [place] who recommended radiation.  She  then got a second opinion and it sounds like she may have had a DCISionRT test performed which suggested she was at low risk for recurrence (but I do not have these records).  Ultimately she decided against radiation. She has not started exemestane yet.  She noticed a nodular area near her right nipple.      Oncology/Hematology History   Ductal carcinoma in situ (DCIS) of right breast   1/12/2023 Imaging    Screening MMG ([facility]):  The current examination reveals no new abnormalities or suspicious changes in appearance.  BI-RADS 2     7/10/2023 Initial Diagnosis    Ductal carcinoma in situ (DCIS) of right breast     7/10/2023 Imaging    Screening MMG with Reggie ([facility]):   Right breast:  Focal asymmetry in the upper outer right breast middle 3rd depth with associated   grouped calcifications.  More anteriorly in the upper outer right breast is a smaller group of   calcifications.  Left breast:  Development of several course benign type left breast calcifications.  There are   developing vascular and secretory type calcifications visualized.  No architectural distortion or   suspicious mass.  BI-RADS 0: Incomplete     8/14/2023 Imaging    Right Diagnostic MMG with Reggie & Right Breast US ([facility]):  MMG:  Right diagnostic mammogram:      There are subtle punctate and fine pleomorphic calcifications within the outer aspect of the right   breast which appears new from the prior examination.   There are punctate, round, and coarse heterogeneous calcifications which appear to be along a   linear distribution associated with a vessel.  These correspond to the upper outer aspect of the   right breast.  There is a subtle focal asymmetry within the upper-outer quadrant of the right breast on current   examination which appears fairly similar to the prior more remote examination from 2013 when   accounting for differences in technique.  US:  Sonographic grayscale and color Doppler images of the right  breast were obtained with   representative examples submitted to PACs for interpretation.  Imaging at the 11 o'clock position   corresponding to the focal asymmetry within the upper-outer quadrant of the right breast   demonstrates no sonographic correlate.  There are incidental intramammary lymph nodes at the 11   o'clock position 13 cm from the nipple.   IMPRESSION:  1. Subtle punctate and fine pleomorphic calcifications within the outer aspect of the right breast   which appear new from prior examination.  Recommend right breast stereotactic biopsy for definitive   tissue diagnosis.  2. Additional calcifications appear linear in distribution and appear to correlate to a small   vessel.  These are considered probably benign and attention on follow-up right diagnostic mammogram   6 months is recommended.  3. Right breast focal asymmetry appears like the more remote prior examination from 2013 when   accounting for differences in technique.  No sonographic correlate was identified.  This could also   be re-evaluated on the follow-up right diagnostic mammogram in 6 months.    BI-RADS 4: Suspicious     8/21/2023 Biopsy    Right Breast, Stereotactic Biopsy ( [facility]):  Right breast, outer, stereotactic-guided core biopsy:               - Atypical ductal hyperplasia (ADH) involving an intraductal papilloma  - Usual ductal hyperplasia (UDH)  - Columnar cell change  - Microcalcifications   Comment:  The above diagnosis was rendered in expert consultation by Mery Sarah MD , [facility] .  -Hydrocoil clip.     10/7/2023 Imaging    Bilateral Breast MRI ( [facility]):  In the middle third of the lateral aspect of the right breast at the 9 o'clock position centered on the order of 6.5 cm from the nipple there is a focal signal void seen at the inferior margin of non-mass enhancement that measures on the order of 1.4 cm in the anterior to posterior dimension, 0.8 cm in the superior to inferior dimension and 0.7 cm in  the medial to lateral dimension. This corresponds to the biopsy-proven site of ADH with the coil-shaped metallic clip.   No other areas of suspicious enhancement or morphology are seen in the right breast. I see no evidence for abnormal right breast skin, nipple or chest wall enhancement and there is no evidence for right axillary or internal mammary chain adenopathy.  In the anterior one-third of the lateral aspect of the left breast centered at the 3:30 position on the order of 4 cm from the nipple there is linear enhancement that measures on the order of 1.5 cm in the anterior to posterior dimension, 0.6 cm in the superior to inferior dimension and 0.7 cm in the medial to lateral dimension. No definite mammographic correlate is appreciated.   No areas of abnormal enhancement or morphology are otherwise seen in the left breast. I see no evidence for abnormal left breast skin, nipple or chest wall enhancement and there is no evidence for left axillary or internal mammary chain adenopathy.  BIRADS 4: Suspicious.      10/27/2023 Imaging    Additional MRI Images ([facility]):  Additional images were taken of the left breast pre and post intravenous administration of gadolinium to evaluate an area of linear enhancement and determine if the area showed features of a vessel.   Following additional acquisition of the images the area of linear enhancement does not show evidence of being a vessel. It is not reliably attached to a vessel or branching structure. Therefore, continued biopsy  of the area of linear enhancement in the left breast at the 3:30 position is recommended.  BI-RADS Category 4: Suspicious.     11/14/2023 Biopsy    Left Breast MR-Guided Biopsy & Right Breast Stereotactic [facility]):    1.  Left breast, 3:00, MRI-guided biopsies for linear enhancement:               -Intraductal papilloma, 2 mm in greatest extent, present as fragments in 2 of 19 total cores.    -No atypical hyperplasia, in situ nor  invasive carcinoma identified.  -Stoplight clip.     2.  Right breast, 11:00, stereotactic biopsies for calcifications: INTERMEDIATE GRADE DUCTAL CARCINOMA IN SITU (DCIS).               -Architectural patterns: Solid and cribriform with central necrosis and associated microcalcifications.               -DCIS is present in a single tissue core measuring up to 7 mm maximally.               -Additional findings include fibroadenomatoid hyperplasia.               -No evidence of in situ carcinoma identified.  -Bowtie clip.     ER+ (%, strong)  NJ+ (%, strong)  Ki-67 12%     12/19/2023 Surgery    Left breast needle-localized excisional biopsy, right breast needle-localized excisional biopsy, and right needle-localized partial mastectomy with bilateral oncoplastic reduction    1. Left Breast, Needle Localized Excisional Biopsy (18 G):               A. Breast parenchyma with micropapilloma and usual ductal hyperplasia.               B. Clip and biopsy site changes are present.   C. Unremarkable skin.                 2. Right Breast, 10:00, Needle Localized Excisional Biopsy (20 G):               A. Breast parenchyma with focal atypical ductal hyperplasia.               B. Clip and biopsy site changes are present.               C. Unremarkable skin.     3. Right Breast, 10:00, Additional Superior and Medial Margins, Reexcision:               A. Breast parenchyma with no significant histopathologic changes       (additional 11 mm of benign tissue).     4. Right Breast, 10:00, Additional Posterior Margin, Reexcision:               A. Breast parenchyma with usual ductal hyperplasia involving an intraductal papilloma       (additional 8 mm of benign tissue).     5. Right Breast, 10:00, Additional Lateral and Inferior Margins, Reexcision:               A. Breast parenchyma with no significant histopathologic changes       (additional 10 mm of benign tissue).     6. Right Breast, 12:00, Needle Localized Partial  Mastectomy (37 G):               A. INTERMEDIATE TO HIGH-GRADE DUCTAL CARCINOMA IN SITU (DCIS):  1. Solid and cribriform types with associated necrosis and calcifications.  2. Extent of DCIS: 10 mm (based on slices involved).  3. Margins are negative for in situ carcinoma; closest distance: DCIS is present 4 mm from the       inferior margin (superseded by specimen #7).  B. Clip and biopsy site changes are present adjacent to DCIS.  C. See synoptic report.     7. Right Breast, 12:00, Additional Inferior and Lateral Margins, Reexcision:               A. Breast parenchyma with no significant histopathologic changes       (additional 8 mm of tissue free of carcinoma).     8. Right Breast, 12:00, Additional Anterior, Superior, and Medial Margins, Reexcision:               A. Breast parenchyma with no significant histopathologic changes       (additional 8 mm of tissue free of carcinoma).     9. Left Breast Tissue, Reduction Mammoplasty (536 G):               A. Benign skin and breast parenchyma with no significant histopathologic changes.     10. Right Breast Tissue, Reduction Mammoplasty (613 G):   A. Benign skin and breast parenchyma with no significant histopathologic changes.         Review of Systems:  See interval history.      Medications:    Current Outpatient Medications:     atorvastatin (LIPITOR) 10 MG tablet, Take 1 tablet by mouth Every Night., Disp: , Rfl:     azelastine (ASTELIN) 0.1 % nasal spray, 1 spray As Needed., Disp: , Rfl:     Cannabidiol 100 MG/ML solution, Take 1 mg by mouth Daily., Disp: , Rfl:     Cholecalciferol (Vitamin D3) 1.25 MG (21884 UT) tablet, Take 1 tablet by mouth 2 (Two) Times a Week. NIGHT ON Sunday AND THURSDAY, Disp: , Rfl:     CINNAMON PO, Take 1 tablet by mouth Daily. HOLD ONE WEEK FOR SURGERY, Disp: , Rfl:     dapagliflozin Propanediol (Farxiga) 10 MG tablet, Take 10 mg by mouth Daily., Disp: , Rfl:     exemestane (Aromasin) 25 MG tablet, Take 1 tablet by mouth Daily for 90  days., Disp: 30 tablet, Rfl: 3    lisinopril-hydrochlorothiazide (PRINZIDE,ZESTORETIC) 20-25 MG per tablet, Take 1 tablet by mouth Daily., Disp: , Rfl:     loratadine (CLARITIN) 10 MG tablet, Take 1 tablet by mouth Daily., Disp: , Rfl:     montelukast (SINGULAIR) 10 MG tablet, Take 1 tablet by mouth Every Night., Disp: , Rfl:     Multiple Vitamins-Minerals (Multi Adult Gummies) chewable tablet, Chew 1 tablet Daily. HOLD FOR ONE WEEK FOR SURGERY, Disp: , Rfl:     omeprazole (priLOSEC) 20 MG capsule, 1 capsule every night at bedtime., Disp: , Rfl:     Allergies:  No Known Allergies    Family History   Problem Relation Age of Onset    Hypertension Mother     Irritable bowel syndrome Mother     Aneurysm Mother     Hypertension Father     Heart disease Father     Diabetes Father     No Known Problems Sister     Hypertension Brother     Cancer Brother     Diabetes Brother     Esophageal cancer Brother     No Known Problems Brother     No Known Problems Brother     No Known Problems Brother     No Known Problems Brother     Aneurysm Maternal Aunt     Aneurysm Maternal Uncle     Cancer Maternal Uncle     Cancer Maternal Grandmother     Malig Hyperthermia Neg Hx        Objective   PHYSICAL EXAMINATION:   Vitals:    03/27/24 1355   BP: 142/80   Pulse: 78   Resp: 18   SpO2: 98%   ECOG 0 - Asymptomatic  General: NAD, well appearing  Psych: a&o x3, normal mood and affect  Eyes: EOMI, no scleral icterus  ENMT: neck supple without masses or thyromegaly, mucous membranes moist  MSK: normal gait, normal ROM in bilateral shoulders  Lymph nodes: no cervical, supraclavicular or axillary lymphadenopathy  Breast:   Right: Sp mastopexy with well-healed scars.  There is some periareolar nodularity, likely fat necrosis.  No nipple abnormalities.  Left: Sp mastopexy with well-healed scars.  No masses or nipple abnormalities.      Assessment & Plan   Assessment:   63 y.o. F with a diagnosis of right breast ductal carcinoma in situ (DCIS),  "intermediate-high grade, ER/NJ positive. She also had a diagnosis of right breast atypical ductal hyperplasia (ADH) and a left breast intraductal papilloma. She underwent right partial mastectomy with right breast excisional biopsy and left breast excisional biopsy with bilateral oncoplastic reduction on 12/19/23 with benign final pathology on the left and pTis on the right.  She declined adjuvant radiation.    Plan:  -Reassured her that the nodular area near her right nipple is likely evolving fat necrosis.  -Advised her to go ahead and start the exemestane.  -Continue follow-up with Dr. Fritz.  -Follow-up in 7/2024 with mammogram.    Audelia Andino MD      CC:  Nat Paez, APRN        Addendum:  I received records from [facility].  She did undergo DCISionRT testing. \"Score returned as 4.8 which is in the elevated range.  At this time she would like to forego radiation treatment despite results of DCISionRT suggesting a higher risk of recurrence without adjuvant radiation.\"  "

## 2024-04-30 ENCOUNTER — LAB (OUTPATIENT)
Dept: LAB | Facility: HOSPITAL | Age: 64
End: 2024-04-30
Payer: MEDICARE

## 2024-04-30 ENCOUNTER — OFFICE VISIT (OUTPATIENT)
Dept: ONCOLOGY | Facility: CLINIC | Age: 64
End: 2024-04-30
Payer: MEDICARE

## 2024-04-30 VITALS
WEIGHT: 218.1 LBS | TEMPERATURE: 97.5 F | DIASTOLIC BLOOD PRESSURE: 81 MMHG | RESPIRATION RATE: 18 BRPM | SYSTOLIC BLOOD PRESSURE: 154 MMHG | OXYGEN SATURATION: 99 % | BODY MASS INDEX: 38.64 KG/M2 | HEIGHT: 63 IN | HEART RATE: 68 BPM

## 2024-04-30 DIAGNOSIS — D05.11 DUCTAL CARCINOMA IN SITU (DCIS) OF RIGHT BREAST: ICD-10-CM

## 2024-04-30 DIAGNOSIS — D05.11 DUCTAL CARCINOMA IN SITU (DCIS) OF RIGHT BREAST: Primary | ICD-10-CM

## 2024-04-30 DIAGNOSIS — N60.99 ATYPICAL DUCTAL HYPERPLASIA OF BREAST: ICD-10-CM

## 2024-04-30 LAB
ALBUMIN SERPL-MCNC: 3.9 G/DL (ref 3.5–5.2)
ALBUMIN/GLOB SERPL: 1.1 G/DL
ALP SERPL-CCNC: 108 U/L (ref 39–117)
ALT SERPL W P-5'-P-CCNC: 13 U/L (ref 1–33)
ANION GAP SERPL CALCULATED.3IONS-SCNC: 10.9 MMOL/L (ref 5–15)
AST SERPL-CCNC: 22 U/L (ref 1–32)
BASOPHILS # BLD AUTO: 0.05 10*3/MM3 (ref 0–0.2)
BASOPHILS NFR BLD AUTO: 0.6 % (ref 0–1.5)
BILIRUB SERPL-MCNC: 0.4 MG/DL (ref 0–1.2)
BUN SERPL-MCNC: 26 MG/DL (ref 8–23)
BUN/CREAT SERPL: 24.1 (ref 7–25)
CALCIUM SPEC-SCNC: 10.2 MG/DL (ref 8.6–10.5)
CHLORIDE SERPL-SCNC: 102 MMOL/L (ref 98–107)
CO2 SERPL-SCNC: 28.1 MMOL/L (ref 22–29)
CREAT SERPL-MCNC: 1.08 MG/DL (ref 0.57–1)
DEPRECATED RDW RBC AUTO: 48.2 FL (ref 37–54)
EGFRCR SERPLBLD CKD-EPI 2021: 57.1 ML/MIN/1.73
EOSINOPHIL # BLD AUTO: 0.27 10*3/MM3 (ref 0–0.4)
EOSINOPHIL NFR BLD AUTO: 3.4 % (ref 0.3–6.2)
ERYTHROCYTE [DISTWIDTH] IN BLOOD BY AUTOMATED COUNT: 15.3 % (ref 12.3–15.4)
GLOBULIN UR ELPH-MCNC: 3.4 GM/DL
GLUCOSE SERPL-MCNC: 139 MG/DL (ref 65–99)
HCT VFR BLD AUTO: 41.2 % (ref 34–46.6)
HGB BLD-MCNC: 12.6 G/DL (ref 12–15.9)
IMM GRANULOCYTES # BLD AUTO: 0.01 10*3/MM3 (ref 0–0.05)
IMM GRANULOCYTES NFR BLD AUTO: 0.1 % (ref 0–0.5)
LYMPHOCYTES # BLD AUTO: 1.74 10*3/MM3 (ref 0.7–3.1)
LYMPHOCYTES NFR BLD AUTO: 22.1 % (ref 19.6–45.3)
MCH RBC QN AUTO: 26.3 PG (ref 26.6–33)
MCHC RBC AUTO-ENTMCNC: 30.6 G/DL (ref 31.5–35.7)
MCV RBC AUTO: 85.8 FL (ref 79–97)
MONOCYTES # BLD AUTO: 0.47 10*3/MM3 (ref 0.1–0.9)
MONOCYTES NFR BLD AUTO: 6 % (ref 5–12)
NEUTROPHILS NFR BLD AUTO: 5.35 10*3/MM3 (ref 1.7–7)
NEUTROPHILS NFR BLD AUTO: 67.8 % (ref 42.7–76)
NRBC BLD AUTO-RTO: 0 /100 WBC (ref 0–0.2)
PLATELET # BLD AUTO: 281 10*3/MM3 (ref 140–450)
PMV BLD AUTO: 9.1 FL (ref 6–12)
POTASSIUM SERPL-SCNC: 4.3 MMOL/L (ref 3.5–5.2)
PROT SERPL-MCNC: 7.3 G/DL (ref 6–8.5)
RBC # BLD AUTO: 4.8 10*6/MM3 (ref 3.77–5.28)
SODIUM SERPL-SCNC: 141 MMOL/L (ref 136–145)
WBC NRBC COR # BLD AUTO: 7.89 10*3/MM3 (ref 3.4–10.8)

## 2024-04-30 PROCEDURE — 36415 COLL VENOUS BLD VENIPUNCTURE: CPT

## 2024-04-30 PROCEDURE — 85025 COMPLETE CBC W/AUTO DIFF WBC: CPT

## 2024-04-30 PROCEDURE — 80053 COMPREHEN METABOLIC PANEL: CPT

## 2024-04-30 RX ORDER — EXEMESTANE 25 MG/1
1 TABLET ORAL DAILY
COMMUNITY
Start: 2024-03-27

## 2024-04-30 NOTE — PROGRESS NOTES
Subjective     REASON FOR follow-up:      Atypical ductal hyperplasia in the right breast  Right breast DCIS s/p right partial mastectomy, 10 mm, grade 3, ER positive W[place] positive  Patient refused radiation.  Initially seen by Dr. Crocker at [place] and had second opinion at [facility].  DCIS on RT testing showed higher score of 4.8 but patient refused radiation  March 27, 2024: Patient started exemestane after Dr. Andino discussed with her again and encouraged her to start it.  April 30, 2024: Patient has severe arthralgias with exemestane and some mild hot flashes and wants to discontinue it.  She discontinued a week ago and her arthralgias have improved.  Will likely need to start her on tamoxifen in 4 weeks once the side effects are resolved      HISTORY OF PRESENT ILLNESS:      Patient is 63-year-old female with history of DCIS and atypical ductal hyperplasia of the right breast s/p right partial mastectomy ER/WY positive.  She was seen by Dr. Crocker at [place] who recommended radiation.  She then got a second opinion at the [facility] and apparently they suggested that she has low risk of recurrence and she ultimately decided against radiation.  She had a nodular area near the right nipple which was thought to be fat necrosis by Dr. Andino who saw her recently.  She had not started exemestane yet and Dr. Andino suggested her to start exemestane.  She saw her on March 27, 2024.    Patient records were obtained by Dr. Andino.  From [facility].  She did undergo DCIS on RT testing.  Score returned as 4.8 which is in the elevated range.  However patient refused radiation despite results on the D CIS on radiation treatment testing a higher level of recurrence without adjuvant radiation.  Patient was seen by Dr.Alden Colby    Patient had started exemestane but complained of mild hot flashes and worsening arthralgias.  She said that the arthralgias were so severe she  does not want to take and hence we will hold off and discontinue exemestane.  We may be able to treat her with tamoxifen which we can start in 4 weeks time in our office    Oncology history:  Patient is a 63-year-old female was referred here by Dr. Audelia Andino for new diagnosis of atypical ductal hyperplasia.  Patient has been noncompliant with her screening mammograms for 10 years after which she presented for screening mammogram the screening mammogram showed abnormality and hence diagnostic imaging was done which showed 2 separate groups of calcifications in the right breast focal asymmetry without an ultrasound correlate.  She then had to undergo biopsy which showed atypical ductal hyperplasia.        The details are as follows    July 10, 2023 screening mammogram at [facility] showed    Right breast:  Focal asymmetry in the upper outer right breast middle 3rd depth with associated   grouped calcifications.  More anteriorly in the upper outer right breast is a smaller group of   calcifications.  Left breast:  Development of several course benign type left breast calcifications.  There are   developing vascular and secretory type calcifications visualized.  No architectural distortion or   suspicious mass.    August 14, 2023: Right breast diagnostic mammogram and ultrasound    IMPRESSION:  1. Subtle punctate and fine pleomorphic calcifications within the outer aspect of the right breast   which appear new from prior examination.  Recommend right breast stereotactic biopsy for definitive   tissue diagnosis.  2. Additional calcifications appear linear in distribution and appear to correlate to a small   vessel.  These are considered probably benign and attention on follow-up right diagnostic mammogram   6 months is recommended.  3. Right breast focal asymmetry appears like the more remote prior examination from 2013 when   accounting for differences in technique.  No sonographic correlate was identified.   This could also   be re-evaluated on the follow-up right diagnostic mammogram in 6 months.    BI-RADS 4: Suspicious    August 21, 2023: Right breast stereotactic biopsy at [facility]    Right breast, outer, stereotactic-guided core biopsy:               - Atypical ductal hyperplasia (ADH) involving an intraductal papilloma  - Usual ductal hyperplasia (UDH)  - Columnar cell change      October 7, 2023: Bilateral breast MRI    In the middle third of the lateral aspect of the right breast at the 9 o'clock position centered on the order of 6.5 cm from the nipple there is a focal signal void seen at the inferior margin of non-mass enhancement that measures on the order of 1.4 cm in the anterior to posterior dimension, 0.8 cm in the superior to inferior dimension and 0.7 cm in the medial to lateral dimension. This corresponds to the biopsy-proven site of ADH with the coil-shaped metallic clip.   No other areas of suspicious enhancement or morphology are seen in the right breast. I see no evidence for abnormal right breast skin, nipple or chest wall enhancement and there is no evidence for right axillary or internal mammary chain adenopathy.  In the anterior one-third of the lateral aspect of the left breast centered at the 3:30 position on the order of 4 cm from the nipple there is linear enhancement that measures on the order of 1.5 cm in the anterior to posterior dimension, 0.6 cm in the superior to inferior dimension and 0.7 cm in the medial to lateral dimension. No definite mammographic correlate is appreciated.   No areas of abnormal enhancement or morphology are otherwise seen in the left breast. I see no evidence for abnormal left breast skin, nipple or chest wall enhancement and there is no evidence for left axillary or internal mammary chain adenopathy.  BIRADS 4: Suspicious.     October 27, 2023: Additional MRI imaging at [facility]  Additional images were taken of the left breast pre and post  intravenous administration of gadolinium to evaluate an area of linear enhancement and determine if the area showed features of a vessel.   Following additional acquisition of the images the area of linear enhancement does not show evidence of being a vessel. It is not reliably attached to a vessel or branching structure. Therefore, continued biopsy  of the area of linear enhancement in the left breast at the 3:30 position is recommended.  BI-RADS Category 4: Suspicious.    November 14, 2023: Left breast MRI guided biopsy and right breast stereotactic biopsy    Left Breast MR-Guided Biopsy & Right Breast Stereotactic Biopsy ([facility]):     1.  Left breast, 3:00, MRI-guided biopsies for linear enhancement:               -Intraductal papilloma, 2 mm in greatest extent, present as fragments in 2 of 19 total cores.    -No atypical hyperplasia, in situ nor invasive carcinoma identified.  -Stoplight clip.     2.  Right breast, 11:00, stereotactic biopsies for calcifications: INTERMEDIATE GRADE DUCTAL CARCINOMA IN SITU (DCIS).               -Architectural patterns: Solid and cribriform with central necrosis and associated microcalcifications.               -DCIS is present in a single tissue core measuring up to 7 mm maximally.               -Additional findings include fibroadenomatoid hyperplasia.               -No evidence of in situ carcinoma identified.  -Bowtie clip.      ER+ (%, strong)  CT+ (%, strong)  Ki-67 12%      December 19, 2023: Left breast localized excisional biopsy, right breast needle localized excisional biopsy and right needle localized partial mastectomy with bilateral oncoplastic reduction    Synoptic Checklist   DCIS OF THE BREAST: Resection   8th Edition - Protocol posted: 3/23/2022DCIS OF THE BREAST: RESECTION - 6, 7, 8  SPECIMEN   Procedure  Excision (less than total mastectomy)   Specimen Laterality  Right   TUMOR   Tumor Site  Clock position     12 o'clock   Histologic Type  Ductal  carcinoma in situ   Size (Extent) of DCIS  Estimated size (extent) of DCIS is at least (Millimeters): 10 mm   Architectural Patterns  Cribriform     Solid   Nuclear Grade  Grade III (high)   Necrosis  Present, focal (small foci or single cell necrosis)   Microcalcifications  Present in DCIS   MARGINS   Margin Status  All margins negative for DCIS   Distance from DCIS to Closest Margin  12 mm   Closest Margin(s) to DCIS  Inferior   Distance from DCIS to Anterior Margin  13 mm   Distance from DCIS to Posterior Margin  15 mm   Distance from DCIS to Superior Margin  20 mm   Distance from DCIS to Inferior Margin  12 mm   Distance from DCIS to Medial Margin  23 mm   Distance from DCIS to Lateral Margin  18 mm   REGIONAL LYMPH NODES   Regional Lymph Node Status  Not applicable (no regional lymph nodes submitted or found)   PATHOLOGIC STAGE CLASSIFICATION (pTNM, AJCC 8th Edition)   Reporting of pT, pN, and (when applicable) pM categories is based on information available to the pathologist at the time the report is issued. As per the AJCC (Chapter 1, 8th Ed.) it is the managing physician’s responsibility to establish the final pathologic stage based upon all pertinent information, including but potentially not limited to this pathology report.   pT Category  pTis (DCIS)   pN Category  pN not assigned (no nodes submitted or found)   Breast Biomarker Testing Performed on Previous Biopsy     Estrogen Receptor (ER) Status  Positive   Percentage of Cells with Nuclear Positivity  %   Breast Biomarker Testing Performed on Previous Biopsy     Progesterone Receptor (PgR) Status  Positive   Percentage of Cells with Nuclear Positivity  %   Comment(s)  [id number]          Past Medical History:   Diagnosis Date    Acid reflux     Aneurysm of anterior communicating artery     Arthritis     Bilateral foot pain 06/04/2021    Brain aneurysm 2020    SEES NEUROLOGIST/ONELIA    Chest pain     HISTORY OF    Chronic kidney disease      COVID-19 11/09/2023    DCIS (ductal carcinoma in situ) of breast     RIGHT BREAST    Diabetes mellitus     PREDIABETIC    GERD (gastroesophageal reflux disease)     Heel pain     History of snoring     HLD (hyperlipidemia)     HTN (hypertension)     Intracranial aneurysm     Intraductal papilloma     LEFT BREAST    Prediabetes     Redness     LEFT ANKLE    RLS (restless legs syndrome)     Seasonal allergies     Sleep apnea     NO CPAP    SOB (shortness of breath)     HISTORY OF, POST ANEURYSM    Vertigo         Past Surgical History:   Procedure Laterality Date    BREAST BIOPSY Right 08/21/2023    Atypical Ductal Hyperplasia     ([facility])    BREAST BIOPSY Bilateral     NOVEMBER 2023    BREAST LUMPECTOMY Right 12/19/2023    Procedure: Right needle localized partial mastectomy and right breast needle localized excisional biopsy, Left breast needle-localized excisional biopsy;  Surgeon: Audelia Andino MD;  Location: [facility];  Service: General;  Laterality: Right;    BREAST SURGERY Bilateral 12/19/2023    Procedure: BILATERAL ONCOPLASTIC REDUCTION/MASTOPEXY;  Surgeon: Fahad Berger MD;  Location: [facility];  Service: Plastics;  Laterality: Bilateral;    CARDIAC CATHETERIZATION      CEREBRAL ANGIOGRAM  2020    Brain aneurysm    CEREBRAL EMBOLIZATION  2019    COLONOSCOPY  2011    COLONOSCOPY N/A 02/01/2023    Procedure: COLONOSCOPY;  Surgeon: Alice Guerra MD;  Location: [facility];  Service: Gastroenterology;  Laterality: N/A;  HEMORRHOIDS    ENDOSCOPY N/A 02/01/2023    Procedure: ESOPHAGOGASTRODUODENOSCOPY WITH BIOPSIES, ESOPHAGEAL BALLOON DILATATION 15-18MM AND 18-20MM;  Surgeon: Alice Guerra MD;  Location: [facility];  Service: Gastroenterology;  Laterality: N/A;  GASTRIC POLYPS, SCHATZKI'S RING, GASTRITIS, HIATAL HERNIA    UPPER GASTROINTESTINAL ENDOSCOPY  2011        Current Outpatient Medications on File Prior to Visit   Medication Sig Dispense  Refill    atorvastatin (LIPITOR) 10 MG tablet Take 1 tablet by mouth Every Night.      azelastine (ASTELIN) 0.1 % nasal spray 1 spray As Needed.      Cannabidiol 100 MG/ML solution Take 1 mg by mouth Daily.      Cholecalciferol (Vitamin D3) 1.25 MG (86696 UT) tablet Take 1 tablet by mouth 2 (Two) Times a Week. NIGHT ON Sunday AND THURSDAY      CINNAMON PO Take 1 tablet by mouth Daily. HOLD ONE WEEK FOR SURGERY      dapagliflozin Propanediol (Farxiga) 10 MG tablet Take 10 mg by mouth Daily.      exemestane (AROMASIN) 25 MG tablet Take 1 tablet by mouth Daily.      lisinopril-hydrochlorothiazide (PRINZIDE,ZESTORETIC) 20-25 MG per tablet Take 1 tablet by mouth Daily.      loratadine (CLARITIN) 10 MG tablet Take 1 tablet by mouth Daily.      montelukast (SINGULAIR) 10 MG tablet Take 1 tablet by mouth Every Night.      Multiple Vitamins-Minerals (Multi Adult Gummies) chewable tablet Chew 1 tablet Daily. HOLD FOR ONE WEEK FOR SURGERY      omeprazole (priLOSEC) 20 MG capsule 1 capsule every night at bedtime.       No current facility-administered medications on file prior to visit.        ALLERGIES:  No Known Allergies     Social History     Socioeconomic History    Marital status: Single    Number of children: 0   Tobacco Use    Smoking status: Never    Smokeless tobacco: Never   Vaping Use    Vaping status: Never Used   Substance and Sexual Activity    Alcohol use: Not Currently     Comment: OCC    Drug use: Never    Sexual activity: Not Currently     Partners: Male        Family History   Problem Relation Age of Onset    Hypertension Mother     Irritable bowel syndrome Mother     Aneurysm Mother     Hypertension Father     Heart disease Father     Diabetes Father     No Known Problems Sister     Hypertension Brother     Cancer Brother     Diabetes Brother     Esophageal cancer Brother     No Known Problems Brother     No Known Problems Brother     No Known Problems Brother     No Known Problems Brother     Aneurysm  "Maternal Aunt     Aneurysm Maternal Uncle     Cancer Maternal Uncle     Cancer Maternal Grandmother     Malig Hyperthermia Neg Hx         Review of Systems   Constitutional:  Negative for appetite change, chills, diaphoresis, fatigue, fever and unexpected weight change.   HENT:  Negative for hearing loss, sore throat and trouble swallowing.    Respiratory:  Negative for cough, chest tightness, shortness of breath and wheezing.    Cardiovascular:  Negative for chest pain, palpitations and leg swelling.   Gastrointestinal:  Negative for abdominal distention, abdominal pain, constipation, diarrhea, nausea and vomiting.   Genitourinary:  Negative for dysuria, frequency, hematuria and urgency.   Musculoskeletal:  Negative for joint swelling.        No muscle weakness.   Skin:  Negative for rash and wound.   Neurological:  Negative for seizures, syncope, speech difficulty, weakness, numbness and headaches.   Hematological:  Negative for adenopathy. Does not bruise/bleed easily.   Psychiatric/Behavioral:  Negative for behavioral problems, confusion and suicidal ideas.           Objective     Vitals:    04/30/24 1121   BP: 154/81   Pulse: 68   Resp: 18   Temp: 97.5 °F (36.4 °C)   TempSrc: Temporal   SpO2: 99%   Weight: 98.9 kg (218 lb 1.6 oz)   Height: 158.8 cm (62.52\")   PainSc:   4   PainLoc: Generalized  Comment: Joints           4/30/2024    11:05 AM   Current Status   ECOG score 0       Physical Exam           This patient's ACP documentation is up to date, and there's nothing further left to document.      CONSTITUTIONAL:  Vital signs reviewed.  No distress, looks comfortable.  RESPIRATORY:  Normal respiratory effort.  Lungs clear to auscultation bilaterally.  BREAST: Right breast: No skin changes, no evidence of breast mass, no nipple discharge, no evidence of any right axillary adenopathy or right supraclavicular adenopathy  Left breast: No evidence of any skin changes, no evidence of any left breast mass and no " evidence of left nipple discharge as well as no left axillary adenopathy or left supraclavicular adenopathy.   CARDIOVASCULAR:  Normal S1, S2.  No murmurs rubs or gallops.  No significant lower extremity edema.  GASTROINTESTINAL: Abdomen appears unremarkable.  Nontender.  No hepatomegaly.  No splenomegaly.  LYMPHATIC:  No cervical, supraclavicular, axillary lymphadenopathy.  SKIN:  Warm.  No rashes.  PSYCHIATRIC:  Normal judgment and insight.  Normal mood and affect.    RECENT LABS:  Hematology WBC   Date Value Ref Range Status   04/30/2024 7.89 3.40 - 10.80 10*3/mm3 Final   11/30/2020 6.33 4.5 - 11.0 10*3/uL Final     RBC   Date Value Ref Range Status   04/30/2024 4.80 3.77 - 5.28 10*6/mm3 Final   11/30/2020 4.58 4.0 - 5.2 10*6/uL Final     Hemoglobin   Date Value Ref Range Status   04/30/2024 12.6 12.0 - 15.9 g/dL Final   11/30/2020 12.0 12.0 - 16.0 g/dL Final     Hematocrit   Date Value Ref Range Status   04/30/2024 41.2 34.0 - 46.6 % Final   11/30/2020 38.6 36.0 - 46.0 % Final     Platelets   Date Value Ref Range Status   04/30/2024 281 140 - 450 10*3/mm3 Final   11/30/2020 270 140 - 440 10*3/uL Final          Assessment & Plan     #.  Pathologic Tis Nx DCIS of the right breast, 10 mm, grade 3, ER positive DC positive s/p right partial mastectomy December 22, 2023, negative margins.  Patient also has atypical ductal hyperplasia of the right breast and the left intraductal papilloma by biopsy.    Patient is referred here for evaluation of chemoprophylaxis.  Given ER/DC positive DCIS and being menopausal we will start exemestane  Tamoxifen causes hot flashes, rare chance for uterine cancer, blood clots, DVT, PE, hair thinning, rare chance for thrombocytopenia, cataracts.  Aromatase inhibitors can cause arthralgias, hot flashes, decrease in bone density, rare chance for diarrhea, also discussed about hot flashes with it.  Evista is approved for osteopenia and can also be used for prophylaxis with fewer side effects  except hot flashes and rare chance for cataracts but it can improve bone density.    Patient will require bone density  Patient has been referred to radiation oncology,  If patient decides to go on radiation then she can start exemestane at the end of radiation.  he was seen by Dr. Crocker at [place] who recommended radiation.  She then got a second opinion at the [email or web address] and apparently they suggested that she has low risk of recurrence and she ultimately decided against radiation.  She had a nodular area near the right nipple which was thought to be fat necrosis by Dr. Andino who saw her recently.  She had not started exemestane yet and Dr. Andino suggested her to start exemestane.  She saw her on March 27, 2024.    Patient records were obtained by Dr. Andino.  From [facility].  She did undergo DCIS on RT testing.  Score returned as 4.8 which is in the elevated range.  However patient refused radiation despite results on the D CIS on radiation treatment testing a higher level of recurrence without adjuvant radiation.  Patient was seen by Dr.Alden Colby    Patient had started exemestane but had severe arthralgias.  She took it for 4 weeks starting March 27 and given severe arthralgias she discontinued a week ago.  Her arthralgias are improving though not completely gone.  We discussed about consideration of tamoxifen as it does not cause arthralgias though it may cause some hot flashes.  We again discussed side effects of tamoxifen.  Will plan to start it in 4 weeks when she sees our nurse practitioner    Plan  Patient seen by radiation oncology Dr. Crocker and refused radiation  Subsequently had second opinion at the [facility] [[email or web address]] with radiation oncology and they did DCIS on RT score which was 4.8 and high risk of recurrence without adjuvant radiation but patient refused radiation  She was to start endocrine therapy with exemestane but when she saw Dr. Andino she had not started  it yet and Dr. Andino had encouraged her to start exemestane  Patient took exemestane for 4 weeks and had severe arthralgias with a level of 8 or 9 on a 1-10 scale and discontinued a week ago.  I had lengthy discussion about consideration of tamoxifen as it does not cause arthralgias like aromatase inhibitor and she is willing to consider that  She is not on any medications that interact with tamoxifen  Follow-up in 4 weeks with plans on starting tamoxifen when she sees our nurse practitioner and gets education again.  Follow-up with me in 3 months at which time I will assess toxicity due to remote tamoxifen.    I spent 45 total minutes, face-to-face, caring for Becca cunningham. Greater than 50% of this time involved counseling and/or coordination of care as documented within this note.          MD Dr. Audelia Simpson

## 2024-06-04 ENCOUNTER — LAB (OUTPATIENT)
Dept: LAB | Facility: HOSPITAL | Age: 64
End: 2024-06-04
Payer: MEDICARE

## 2024-06-04 ENCOUNTER — OFFICE VISIT (OUTPATIENT)
Dept: ONCOLOGY | Facility: CLINIC | Age: 64
End: 2024-06-04
Payer: MEDICARE

## 2024-06-04 VITALS
OXYGEN SATURATION: 96 % | RESPIRATION RATE: 18 BRPM | HEART RATE: 87 BPM | WEIGHT: 218.2 LBS | SYSTOLIC BLOOD PRESSURE: 142 MMHG | BODY MASS INDEX: 38.66 KG/M2 | HEIGHT: 63 IN | DIASTOLIC BLOOD PRESSURE: 82 MMHG | TEMPERATURE: 98 F

## 2024-06-04 DIAGNOSIS — D05.11 DUCTAL CARCINOMA IN SITU (DCIS) OF RIGHT BREAST: Primary | ICD-10-CM

## 2024-06-04 DIAGNOSIS — D05.11 DUCTAL CARCINOMA IN SITU (DCIS) OF RIGHT BREAST: ICD-10-CM

## 2024-06-04 DIAGNOSIS — N60.99 ATYPICAL DUCTAL HYPERPLASIA OF BREAST: ICD-10-CM

## 2024-06-04 LAB
ALBUMIN SERPL-MCNC: 3.7 G/DL (ref 3.5–5.2)
ALBUMIN/GLOB SERPL: 1.2 G/DL
ALP SERPL-CCNC: 106 U/L (ref 39–117)
ALT SERPL W P-5'-P-CCNC: 6 U/L (ref 1–33)
ANION GAP SERPL CALCULATED.3IONS-SCNC: 9.6 MMOL/L (ref 5–15)
AST SERPL-CCNC: 21 U/L (ref 1–32)
BASOPHILS # BLD AUTO: 0.05 10*3/MM3 (ref 0–0.2)
BASOPHILS NFR BLD AUTO: 0.7 % (ref 0–1.5)
BILIRUB SERPL-MCNC: 0.3 MG/DL (ref 0–1.2)
BUN SERPL-MCNC: 20 MG/DL (ref 8–23)
BUN/CREAT SERPL: 18 (ref 7–25)
CALCIUM SPEC-SCNC: 9.2 MG/DL (ref 8.6–10.5)
CHLORIDE SERPL-SCNC: 101 MMOL/L (ref 98–107)
CO2 SERPL-SCNC: 28.4 MMOL/L (ref 22–29)
CREAT SERPL-MCNC: 1.11 MG/DL (ref 0.57–1)
DEPRECATED RDW RBC AUTO: 47.8 FL (ref 37–54)
EGFRCR SERPLBLD CKD-EPI 2021: 55.3 ML/MIN/1.73
EOSINOPHIL # BLD AUTO: 0.24 10*3/MM3 (ref 0–0.4)
EOSINOPHIL NFR BLD AUTO: 3.6 % (ref 0.3–6.2)
ERYTHROCYTE [DISTWIDTH] IN BLOOD BY AUTOMATED COUNT: 15.4 % (ref 12.3–15.4)
GLOBULIN UR ELPH-MCNC: 3.1 GM/DL
GLUCOSE SERPL-MCNC: 164 MG/DL (ref 65–99)
HCT VFR BLD AUTO: 39.8 % (ref 34–46.6)
HGB BLD-MCNC: 12.3 G/DL (ref 12–15.9)
IMM GRANULOCYTES # BLD AUTO: 0.02 10*3/MM3 (ref 0–0.05)
IMM GRANULOCYTES NFR BLD AUTO: 0.3 % (ref 0–0.5)
LYMPHOCYTES # BLD AUTO: 1.85 10*3/MM3 (ref 0.7–3.1)
LYMPHOCYTES NFR BLD AUTO: 27.7 % (ref 19.6–45.3)
MCH RBC QN AUTO: 26.5 PG (ref 26.6–33)
MCHC RBC AUTO-ENTMCNC: 30.9 G/DL (ref 31.5–35.7)
MCV RBC AUTO: 85.6 FL (ref 79–97)
MONOCYTES # BLD AUTO: 0.46 10*3/MM3 (ref 0.1–0.9)
MONOCYTES NFR BLD AUTO: 6.9 % (ref 5–12)
NEUTROPHILS NFR BLD AUTO: 4.07 10*3/MM3 (ref 1.7–7)
NEUTROPHILS NFR BLD AUTO: 60.8 % (ref 42.7–76)
NRBC BLD AUTO-RTO: 0 /100 WBC (ref 0–0.2)
PLATELET # BLD AUTO: 260 10*3/MM3 (ref 140–450)
PMV BLD AUTO: 9.1 FL (ref 6–12)
POTASSIUM SERPL-SCNC: 4.9 MMOL/L (ref 3.5–5.2)
PROT SERPL-MCNC: 6.8 G/DL (ref 6–8.5)
RBC # BLD AUTO: 4.65 10*6/MM3 (ref 3.77–5.28)
SODIUM SERPL-SCNC: 139 MMOL/L (ref 136–145)
WBC NRBC COR # BLD AUTO: 6.69 10*3/MM3 (ref 3.4–10.8)

## 2024-06-04 PROCEDURE — 1125F AMNT PAIN NOTED PAIN PRSNT: CPT | Performed by: NURSE PRACTITIONER

## 2024-06-04 PROCEDURE — 99214 OFFICE O/P EST MOD 30 MIN: CPT | Performed by: NURSE PRACTITIONER

## 2024-06-04 PROCEDURE — 36415 COLL VENOUS BLD VENIPUNCTURE: CPT

## 2024-06-04 PROCEDURE — 85025 COMPLETE CBC W/AUTO DIFF WBC: CPT

## 2024-06-04 PROCEDURE — 80053 COMPREHEN METABOLIC PANEL: CPT

## 2024-06-04 RX ORDER — LETROZOLE 2.5 MG/1
2.5 TABLET, FILM COATED ORAL DAILY
Qty: 30 TABLET | Refills: 1 | Status: SHIPPED | OUTPATIENT
Start: 2024-06-04

## 2024-06-04 NOTE — PROGRESS NOTES
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  Initially seen by Dr. Crocker at [place] and had second opinion at [facility].  DCIS on RT testing showed higher score of 4.8 but patient refused radiation  March 27, 2024: Patient started exemestane after Dr. Andino discussed with her again and encouraged her to start it.  April 30, 2024: Patient has severe arthralgias with exemestane and some mild hot flashes and wants to discontinue it.  She discontinued a week ago and her arthralgias have improved.  Will likely need to start her on tamoxifen in 4 weeks once the side effects are resolved      HISTORY OF PRESENT ILLNESS:      Patient is a 63 y.o. female with history of DCIS and atypical ductal hyperplasia of the right breast s/p right partial mastectomy ER/DE positive.  She was seen by Dr. Crocker at [place] who recommended radiation.  She then got a second opinion at the [facility] and apparently they suggested that she has low risk of recurrence and she ultimately decided against radiation.  She had a nodular area near the right nipple which was thought to be fat necrosis by Dr. Andino who saw her recently.  She had not started exemestane yet and Dr. Andino suggested she start exemestane.  She saw her on March 27, 2024.    Patient records were obtained by Dr. Andino.  From [facility].  She did undergo DCIS on RT testing.  Score returned as 4.8 which is in the elevated range.  However patient refused radiation despite results on the DCIS on radiation treatment testing a higher level of recurrence without adjuvant radiation.  Patient was seen by Dr. Miguel Colby.    Patient seen 5/1/2024 in follow-up, noting poor tolerance to exemestane with significant arthralgias interfering with quality of life.  She was  instructed to hold x 4 weeks and return today for reevaluation.    As she is reviewed back today she is still having some degree of stiffness and discomfort in her hand joints but overall is much better.  She has done some reading/research about tamoxifen since we last saw her and she does not feel comfortable taking this.  She would rather try different AI.    She denies other concerns at this time.    Oncology history:  Patient is a 63-year-old female was referred here by Dr. Audelia Andino for new diagnosis of atypical ductal hyperplasia.  Patient has been noncompliant with her screening mammograms for 10 years after which she presented for screening mammogram the screening mammogram showed abnormality and hence diagnostic imaging was done which showed 2 separate groups of calcifications in the right breast focal asymmetry without an ultrasound correlate.  She then had to undergo biopsy which showed atypical ductal hyperplasia.        The details are as follows    July 10, 2023 screening mammogram at [facility] showed    Right breast:  Focal asymmetry in the upper outer right breast middle 3rd depth with associated   grouped calcifications.  More anteriorly in the upper outer right breast is a smaller group of   calcifications.  Left breast:  Development of several course benign type left breast calcifications.  There are   developing vascular and secretory type calcifications visualized.  No architectural distortion or   suspicious mass.    August 14, 2023: Right breast diagnostic mammogram and ultrasound    IMPRESSION:  1. Subtle punctate and fine pleomorphic calcifications within the outer aspect of the right breast   which appear new from prior examination.  Recommend right breast stereotactic biopsy for definitive   tissue diagnosis.  2. Additional calcifications appear linear in distribution and appear to correlate to a small   vessel.  These are considered probably benign and attention on follow-up  right diagnostic mammogram   6 months is recommended.  3. Right breast focal asymmetry appears like the more remote prior examination from 2013 when   accounting for differences in technique.  No sonographic correlate was identified.  This could also   be re-evaluated on the follow-up right diagnostic mammogram in 6 months.    BI-RADS 4: Suspicious    August 21, 2023: Right breast stereotactic biopsy at [facility]    Right breast, outer, stereotactic-guided core biopsy:               - Atypical ductal hyperplasia (ADH) involving an intraductal papilloma  - Usual ductal hyperplasia (UDH)  - Columnar cell change      October 7, 2023: Bilateral breast MRI    In the middle third of the lateral aspect of the right breast at the 9 o'clock position centered on the order of 6.5 cm from the nipple there is a focal signal void seen at the inferior margin of non-mass enhancement that measures on the order of 1.4 cm in the anterior to posterior dimension, 0.8 cm in the superior to inferior dimension and 0.7 cm in the medial to lateral dimension. This corresponds to the biopsy-proven site of ADH with the coil-shaped metallic clip.   No other areas of suspicious enhancement or morphology are seen in the right breast. I see no evidence for abnormal right breast skin, nipple or chest wall enhancement and there is no evidence for right axillary or internal mammary chain adenopathy.  In the anterior one-third of the lateral aspect of the left breast centered at the 3:30 position on the order of 4 cm from the nipple there is linear enhancement that measures on the order of 1.5 cm in the anterior to posterior dimension, 0.6 cm in the superior to inferior dimension and 0.7 cm in the medial to lateral dimension. No definite mammographic correlate is appreciated.   No areas of abnormal enhancement or morphology are otherwise seen in the left breast. I see no evidence for abnormal left breast skin, nipple or chest wall enhancement  and there is no evidence for left axillary or internal mammary chain adenopathy.  BIRADS 4: Suspicious.     October 27, 2023: Additional MRI imaging at [facility]  Additional images were taken of the left breast pre and post intravenous administration of gadolinium to evaluate an area of linear enhancement and determine if the area showed features of a vessel.   Following additional acquisition of the images the area of linear enhancement does not show evidence of being a vessel. It is not reliably attached to a vessel or branching structure. Therefore, continued biopsy  of the area of linear enhancement in the left breast at the 3:30 position is recommended.  BI-RADS Category 4: Suspicious.    November 14, 2023: Left breast MRI guided biopsy and right breast stereotactic biopsy    Left Breast MR-Guided Biopsy & [facility]     1.  Left breast, 3:00, MRI-guided biopsies for linear enhancement:               -Intraductal papilloma, 2 mm in greatest extent, present as fragments in 2 of 19 total cores.    -No atypical hyperplasia, in situ nor invasive carcinoma identified.  -Stoplight clip.     2.  Right breast, 11:00, stereotactic biopsies for calcifications: INTERMEDIATE GRADE DUCTAL CARCINOMA IN SITU (DCIS).               -Architectural patterns: Solid and cribriform with central necrosis and associated microcalcifications.               -DCIS is present in a single tissue core measuring up to 7 mm maximally.               -Additional findings include fibroadenomatoid hyperplasia.               -No evidence of in situ carcinoma identified.  -Bowtie clip.      ER+ (%, strong)  TN+ (%, strong)  Ki-67 12%      December 19, 2023: Left breast localized excisional biopsy, right breast needle localized excisional biopsy and right needle localized partial mastectomy with bilateral oncoplastic reduction    Synoptic Checklist   DCIS OF THE BREAST: Resection   8th Edition -  Protocol posted: 3/23/2022DCIS OF THE BREAST: RESECTION - 6, 7, 8  SPECIMEN   Procedure  Excision (less than total mastectomy)   Specimen Laterality  Right   TUMOR   Tumor Site  Clock position     12 o'clock   Histologic Type  Ductal carcinoma in situ   Size (Extent) of DCIS  Estimated size (extent) of DCIS is at least (Millimeters): 10 mm   Architectural Patterns  Cribriform     Solid   Nuclear Grade  Grade III (high)   Necrosis  Present, focal (small foci or single cell necrosis)   Microcalcifications  Present in DCIS   MARGINS   Margin Status  All margins negative for DCIS   Distance from DCIS to Closest Margin  12 mm   Closest Margin(s) to DCIS  Inferior   Distance from DCIS to Anterior Margin  13 mm   Distance from DCIS to Posterior Margin  15 mm   Distance from DCIS to Superior Margin  20 mm   Distance from DCIS to Inferior Margin  12 mm   Distance from DCIS to Medial Margin  23 mm   Distance from DCIS to Lateral Margin  18 mm   REGIONAL LYMPH NODES   Regional Lymph Node Status  Not applicable (no regional lymph nodes submitted or found)   PATHOLOGIC STAGE CLASSIFICATION (pTNM, AJCC 8th Edition)   Reporting of pT, pN, and (when applicable) pM categories is based on information available to the pathologist at the time the report is issued. As per the AJCC (Chapter 1, 8th Ed.) it is the managing physician’s responsibility to establish the final pathologic stage based upon all pertinent information, including but potentially not limited to this pathology report.   pT Category  pTis (DCIS)   pN Category  pN not assigned (no nodes submitted or found)   Breast Biomarker Testing Performed on Previous Biopsy     Estrogen Receptor (ER) Status  Positive   Percentage of Cells with Nuclear Positivity  %   Breast Biomarker Testing Performed on Previous Biopsy     Progesterone Receptor (PgR) Status  Positive   Percentage of Cells with Nuclear Positivity  %   Comment(s)  [id number]          Past Medical History:    Diagnosis Date    Acid reflux     Aneurysm of anterior communicating artery     Arthritis     Bilateral foot pain 06/04/2021    Brain aneurysm 2020    SEES NEUROLOGIST/ONELIA    Chest pain     HISTORY OF    Chronic kidney disease     COVID-19 11/09/2023    DCIS (ductal carcinoma in situ) of breast     RIGHT BREAST    Diabetes mellitus     PREDIABETIC    GERD (gastroesophageal reflux disease)     Heel pain     History of snoring     HLD (hyperlipidemia)     HTN (hypertension)     Intracranial aneurysm     Intraductal papilloma     LEFT BREAST    Prediabetes     Redness     LEFT ANKLE    RLS (restless legs syndrome)     Seasonal allergies     Sleep apnea     NO CPAP    SOB (shortness of breath)     HISTORY OF, POST ANEURYSM    Vertigo         Past Surgical History:   Procedure Laterality Date    BREAST BIOPSY Right 08/21/2023    Atypical Ductal Hyperplasia     ([facility])    BREAST BIOPSY Bilateral     NOVEMBER 2023    BREAST LUMPECTOMY Right 12/19/2023    Procedure: Right needle localized partial mastectomy and right breast needle localized excisional biopsy, Left breast needle-localized excisional biopsy;  Surgeon: Audelia Andino MD;  Location: [facility]  Service: General;  Laterality: Right;    BREAST SURGERY Bilateral 12/19/2023    Procedure: BILATERAL ONCOPLASTIC REDUCTION/MASTOPEXY;  Surgeon: Fahad Berger MD;  Location: [facility]  Service: Plastics;  Laterality: Bilateral;    CARDIAC CATHETERIZATION      CEREBRAL ANGIOGRAM  2020    Brain aneurysm    CEREBRAL EMBOLIZATION  2019    COLONOSCOPY  2011    COLONOSCOPY N/A 02/01/2023    Procedure: COLONOSCOPY;  Surgeon: Alice Guerra MD;  Location: [facility] ENDOSCOPY;  Service: Gastroenterology;  Laterality: N/A;  HEMORRHOIDS    ENDOSCOPY N/A 02/01/2023    Procedure: ESOPHAGOGASTRODUODENOSCOPY WITH BIOPSIES, ESOPHAGEAL BALLOON DILATATION 15-18MM AND 18-20MM;  Surgeon: Alice Guerra MD;  Location: [facility] ENDOSCOPY;   Service: Gastroenterology;  Laterality: N/A;  GASTRIC POLYPS, SCHATZKI'S RING, GASTRITIS, HIATAL HERNIA    UPPER GASTROINTESTINAL ENDOSCOPY  2011        Current Outpatient Medications on File Prior to Visit   Medication Sig Dispense Refill    atorvastatin (LIPITOR) 10 MG tablet Take 1 tablet by mouth Every Night.      azelastine (ASTELIN) 0.1 % nasal spray 1 spray As Needed.      Cannabidiol 100 MG/ML solution Take 1 mg by mouth Daily.      Cholecalciferol (Vitamin D3) 1.25 MG (17312 UT) tablet Take 1 tablet by mouth 2 (Two) Times a Week. NIGHT ON Sunday AND THURSDAY      CINNAMON PO Take 1 tablet by mouth Daily. HOLD ONE WEEK FOR SURGERY      dapagliflozin Propanediol (Farxiga) 10 MG tablet Take 10 mg by mouth Daily.      lisinopril-hydrochlorothiazide (PRINZIDE,ZESTORETIC) 20-25 MG per tablet Take 1 tablet by mouth Daily.      loratadine (CLARITIN) 10 MG tablet Take 1 tablet by mouth Daily.      montelukast (SINGULAIR) 10 MG tablet Take 1 tablet by mouth Every Night.      Multiple Vitamins-Minerals (Multi Adult Gummies) chewable tablet Chew 1 tablet Daily. HOLD FOR ONE WEEK FOR SURGERY      omeprazole (priLOSEC) 20 MG capsule 1 capsule every night at bedtime.      [DISCONTINUED] exemestane (AROMASIN) 25 MG tablet Take 1 tablet by mouth Daily.       No current facility-administered medications on file prior to visit.        ALLERGIES:  No Known Allergies     Social History     Socioeconomic History    Marital status: Single    Number of children: 0   Tobacco Use    Smoking status: Never    Smokeless tobacco: Never   Vaping Use    Vaping status: Never Used   Substance and Sexual Activity    Alcohol use: Not Currently     Comment: OCC    Drug use: Never    Sexual activity: Not Currently     Partners: Male        Family History   Problem Relation Age of Onset    Hypertension Mother     Irritable bowel syndrome Mother     Aneurysm Mother     Hypertension Father     Heart disease Father     Diabetes Father     No Known  "Problems Sister     Hypertension Brother     Cancer Brother     Diabetes Brother     Esophageal cancer Brother     No Known Problems Brother     No Known Problems Brother     No Known Problems Brother     No Known Problems Brother     Aneurysm Maternal Aunt     Aneurysm Maternal Uncle     Cancer Maternal Uncle     Cancer Maternal Grandmother     Malig Hyperthermia Neg Hx          Objective     Vitals:    06/04/24 1359   BP: 142/82   Pulse: 87   Resp: 18   Temp: 98 °F (36.7 °C)   TempSrc: Oral   SpO2: 96%   Weight: 99 kg (218 lb 3.2 oz)   Height: 158.8 cm (62.52\")   PainSc:   8   PainLoc: Generalized           6/4/2024     1:55 PM   Current Status   ECOG score 0       Physical Exam           This patient's ACP documentation is up to date, and there's nothing further left to document.      CONSTITUTIONAL:  Vital signs reviewed.  No distress, looks comfortable.  RESPIRATORY:  Normal respiratory effort.  Lungs clear to auscultation bilaterally.  BREAST: Right breast: No skin changes, no evidence of breast mass, no nipple discharge, no evidence of any right axillary adenopathy or right supraclavicular adenopathy  Left breast: No evidence of any skin changes, no evidence of any left breast mass and no evidence of left nipple discharge as well as no left axillary adenopathy or left supraclavicular adenopathy.   CARDIOVASCULAR:  Normal S1, S2.  No murmurs rubs or gallops.  No significant lower extremity edema.  GASTROINTESTINAL: Abdomen appears unremarkable.  Nontender.  No hepatomegaly.  No splenomegaly.  LYMPHATIC:  No cervical, supraclavicular, axillary lymphadenopathy.  SKIN:  Warm.  No rashes.  PSYCHIATRIC:  Normal judgment and insight.  Normal mood and affect.    I have reexamined the patient and the results are consistent with the previously documented exam. Tawnya Freeman, MARINO       RECENT LABS:  Results from last 7 days   Lab Units 06/04/24  1343   WBC 10*3/mm3 6.69   NEUTROS ABS 10*3/mm3 4.07   HEMOGLOBIN " g/dL 12.3   HEMATOCRIT % 39.8   PLATELETS 10*3/mm3 260     Results from last 7 days   Lab Units 06/04/24  1343   SODIUM mmol/L 139   POTASSIUM mmol/L 4.9   CHLORIDE mmol/L 101   CO2 mmol/L 28.4   BUN mg/dL 20   CREATININE mg/dL 1.11*   CALCIUM mg/dL 9.2   ALBUMIN g/dL 3.7   BILIRUBIN mg/dL 0.3   ALK PHOS U/L 106   ALT (SGPT) U/L 6   AST (SGOT) U/L 21   GLUCOSE mg/dL 164*               Assessment & Plan     #.  Pathologic Tis Nx DCIS of the right breast, 10 mm, grade 3, ER positive [place] positive s/p right partial mastectomy December 22, 2023, negative margins.  Patient also has atypical ductal hyperplasia of the right breast and the left intraductal papilloma by biopsy.    Patient is referred here for evaluation of chemoprophylaxis.  Given ER/[place] positive DCIS and being menopausal we will start exemestane  Tamoxifen causes hot flashes, rare chance for uterine cancer, blood clots, DVT, PE, hair thinning, rare chance for thrombocytopenia, cataracts.  Aromatase inhibitors can cause arthralgias, hot flashes, decrease in bone density, rare chance for diarrhea, also discussed about hot flashes with it.  Evista is approved for osteopenia and can also be used for prophylaxis with fewer side effects except hot flashes and rare chance for cataracts but it can improve bone density.    Patient will require bone density  Patient has been referred to radiation oncology,  If patient decides to go on radiation then she can start exemestane at the end of radiation.  She was seen by Dr. Crocker at [place] who recommended radiation.  She then got a second opinion at the [facility] and apparently they suggested that she has low risk of recurrence and she ultimately decided against radiation.  She had a nodular area near the right nipple which was thought to be fat necrosis by Dr. Andino who saw her recently.  She had not started exemestane yet and Dr. Andino suggested her to start exemestane.  She saw her on March 27,  2024.  Patient records were obtained by Dr. Andino.  From [facility].  She did undergo DCIS on RT testing.  Score returned as 4.8 which is in the elevated range.  However patient refused radiation despite results on the D CIS on radiation treatment testing a higher level of recurrence without adjuvant radiation.  Patient was seen by Dr.Alden Colby  5/1/2024 Patient started exemestane but had severe arthralgias.  She took it for 4 weeks starting March 27 and given severe arthralgias she discontinued a week ago.  Her arthralgias are improving though not completely gone.  We discussed about consideration of tamoxifen as it does not cause arthralgias though it may cause some hot flashes.  We again discussed side effects of tamoxifen.  Will plan to start it in 4 weeks when she sees our nurse practitioner.  6/4/2024 per review today, patient's arthralgias overall significantly improved though she is still having some degree of discomfort in her hands.  She has done more reading/research about tamoxifen and does not want to take this drug.  She would rather try different AI.  We discussed switching to Femara and also adding tart cherry extract to try to help her continued low-level arthralgias.    Plan  Patient seen by radiation oncology Dr. Crocker and refused radiation  Subsequently had second opinion at the [facility] with radiation oncology and they did DCIS on RT score which was 4.8 and high risk of recurrence without adjuvant radiation but patient refused radiation  Patient took exemestane for 4 weeks and had severe arthralgias with a level of 8 or 9 on a 1-10 scale and discontinued    Patient does not want to take tamoxifen.  Mary trying a different AI with addition of tart cherry extract.  She is agreeable to this.    New prescription sent in for Femara 2.5 mg daily.  Patient given a handout on tart cherry extract dosing.  Patient started to wait 2 more weeks from now, starting the tart  cherry now and then in 2 weeks beginning the Femara.  She will then see Dr. Fritz roughly 6 weeks from now (about 4 weeks into taking Femara) and we will assess her tolerance.    I spent 36 minutes caring for Becca on this date of service. This time includes time spent by me in the following activities: preparing for the visit, reviewing tests, obtaining and/or reviewing a separately obtained history, counseling and educating the patient/family/caregiver, ordering medications, tests, or procedures, documenting information in the medical record, and care coordination          MARINO Fitzgerald Dr.

## 2024-07-15 ENCOUNTER — HOSPITAL ENCOUNTER (OUTPATIENT)
Dept: MAMMOGRAPHY | Facility: HOSPITAL | Age: 64
Discharge: HOME | End: 2024-07-15
Admitting: SURGERY
Payer: MEDICARE

## 2024-07-15 DIAGNOSIS — Z12.31 ENCOUNTER FOR SCREENING MAMMOGRAM FOR MALIGNANT NEOPLASM OF BREAST: ICD-10-CM

## 2024-07-15 PROCEDURE — 77063 BREAST TOMOSYNTHESIS BI: CPT

## 2024-07-15 PROCEDURE — 77067 SCR MAMMO BI INCL CAD: CPT

## 2024-07-16 ENCOUNTER — TELEPHONE (OUTPATIENT)
Dept: ONCOLOGY | Facility: CLINIC | Age: 64
End: 2024-07-16

## 2024-07-16 ENCOUNTER — TELEPHONE (OUTPATIENT)
Dept: ONCOLOGY | Facility: CLINIC | Age: 64
End: 2024-07-16
Payer: MEDICARE

## 2024-07-16 RX ORDER — LETROZOLE 2.5 MG/1
2.5 TABLET, FILM COATED ORAL DAILY
Qty: 90 TABLET | Refills: 0 | Status: SHIPPED | OUTPATIENT
Start: 2024-07-16

## 2024-07-16 NOTE — TELEPHONE ENCOUNTER
Caller: Becca Bansal    Relationship to patient: Self    Best call back number: [phone]     Chief complaint: R/S    Type of visit: LAB & FOLLOW UP 1    Requested date: CALL PT TO R/S FOR A MONDAY, TUES OR WED . CAN NOT DO THURSDAYS OR FRIDAYS.     If rescheduling, when is the original appointment: 8/15

## 2024-07-16 NOTE — TELEPHONE ENCOUNTER
Caller: Becca Bansal Isabel    Relationship: Self    Best call back number: [phone]     Requested Prescriptions:   Requested Prescriptions     Pending Prescriptions Disp Refills    letrozole (FEMARA) 2.5 MG tablet 30 tablet 1     Sig: Take 1 tablet by mouth Daily.        Pharmacy where request should be sent: [facility] - [address] [phone] [facility] [phone]      Last office visit with prescribing clinician: 4/30/2024   Last telemedicine visit with prescribing clinician: Visit date not found   Next office visit with prescribing clinician: 8/15/2024     Additional details provided by patient: PT HAS BEEN OUT A COUPLE OF WEEKS , NOW IS BEING RESCHEDULED FOR AUGUST SO NEEDS REFILL ON THIS NOW    Does the patient have less than a 3 day supply:  [x] Yes  [] No    Would you like a call back once the refill request has been completed: [] Yes [x] No    If the office needs to give you a call back, can they leave a voicemail: [] Yes [x] No    Basilia Nathan Rep   07/16/24 14:12 EDT

## 2024-07-18 ENCOUNTER — TELEPHONE (OUTPATIENT)
Dept: SURGERY | Facility: CLINIC | Age: 64
End: 2024-07-18
Payer: MEDICARE

## 2024-07-18 DIAGNOSIS — R92.1 BREAST CALCIFICATIONS ON MAMMOGRAM: Primary | ICD-10-CM

## 2024-07-18 NOTE — TELEPHONE ENCOUNTER
----- Message from Rex Anderson sent at 7/18/2024  8:22 AM EDT -----  Please set her up for left diagnostic mammogram and move her appointment with me to after the new mammogram  ----- Message -----  From: Audelia Andino MD  Sent: 7/16/2024   4:00 PM EDT  To: MARINO Cardenas; Shilpa Hutson MA    Please let the patient know that she needs additional diagnostic imaging and order.  Probably need to reschedule her appoint with Rex.

## 2024-07-18 NOTE — TELEPHONE ENCOUNTER
Spoke to pt and let her know she needs add imaging on her left side I got her set up at [facility] on 07/30 @ 1pm and then moved her appt with trini mulligan to after on 08/06 @ 820   Pt stated understanding

## 2024-07-30 ENCOUNTER — HOSPITAL ENCOUNTER (OUTPATIENT)
Dept: MAMMOGRAPHY | Facility: HOSPITAL | Age: 64
Discharge: HOME | End: 2024-07-30
Admitting: NURSE PRACTITIONER
Payer: MEDICARE

## 2024-07-30 DIAGNOSIS — R92.1 BREAST CALCIFICATIONS ON MAMMOGRAM: ICD-10-CM

## 2024-07-30 PROCEDURE — G0279 TOMOSYNTHESIS, MAMMO: HCPCS

## 2024-07-30 PROCEDURE — 77065 DX MAMMO INCL CAD UNI: CPT

## 2024-08-06 ENCOUNTER — OFFICE VISIT (OUTPATIENT)
Dept: SURGERY | Facility: CLINIC | Age: 64
End: 2024-08-06
Payer: MEDICARE

## 2024-08-06 VITALS
BODY MASS INDEX: 38.27 KG/M2 | HEART RATE: 99 BPM | DIASTOLIC BLOOD PRESSURE: 80 MMHG | WEIGHT: 216 LBS | OXYGEN SATURATION: 94 % | HEIGHT: 63 IN | SYSTOLIC BLOOD PRESSURE: 132 MMHG

## 2024-08-06 DIAGNOSIS — D05.11 DUCTAL CARCINOMA IN SITU (DCIS) OF RIGHT BREAST: Primary | ICD-10-CM

## 2024-08-06 DIAGNOSIS — R92.8 ABNORMAL MAMMOGRAM: ICD-10-CM

## 2024-08-06 PROCEDURE — 1160F RVW MEDS BY RX/DR IN RCRD: CPT | Performed by: NURSE PRACTITIONER

## 2024-08-06 PROCEDURE — 1159F MED LIST DOCD IN RCRD: CPT | Performed by: NURSE PRACTITIONER

## 2024-08-06 PROCEDURE — 99213 OFFICE O/P EST LOW 20 MIN: CPT | Performed by: NURSE PRACTITIONER

## 2024-08-20 ENCOUNTER — LAB (OUTPATIENT)
Dept: LAB | Facility: HOSPITAL | Age: 64
End: 2024-08-20
Payer: MEDICARE

## 2024-08-20 ENCOUNTER — OFFICE VISIT (OUTPATIENT)
Dept: ONCOLOGY | Facility: CLINIC | Age: 64
End: 2024-08-20
Payer: MEDICARE

## 2024-08-20 VITALS
DIASTOLIC BLOOD PRESSURE: 81 MMHG | RESPIRATION RATE: 16 BRPM | SYSTOLIC BLOOD PRESSURE: 149 MMHG | HEIGHT: 63 IN | WEIGHT: 217.7 LBS | BODY MASS INDEX: 38.57 KG/M2 | OXYGEN SATURATION: 96 % | HEART RATE: 104 BPM | TEMPERATURE: 98.3 F

## 2024-08-20 DIAGNOSIS — N60.99 ATYPICAL DUCTAL HYPERPLASIA OF BREAST: ICD-10-CM

## 2024-08-20 DIAGNOSIS — N60.99 ATYPICAL DUCTAL HYPERPLASIA OF BREAST: Primary | ICD-10-CM

## 2024-08-20 DIAGNOSIS — D05.11 DUCTAL CARCINOMA IN SITU (DCIS) OF RIGHT BREAST: ICD-10-CM

## 2024-08-20 LAB
ALBUMIN SERPL-MCNC: 4.1 G/DL (ref 3.5–5.2)
ALBUMIN/GLOB SERPL: 1.2 G/DL
ALP SERPL-CCNC: 131 U/L (ref 39–117)
ALT SERPL W P-5'-P-CCNC: 9 U/L (ref 1–33)
ANION GAP SERPL CALCULATED.3IONS-SCNC: 9.2 MMOL/L (ref 5–15)
AST SERPL-CCNC: 22 U/L (ref 1–32)
BASOPHILS # BLD AUTO: 0.04 10*3/MM3 (ref 0–0.2)
BASOPHILS NFR BLD AUTO: 0.4 % (ref 0–1.5)
BILIRUB SERPL-MCNC: 0.4 MG/DL (ref 0–1.2)
BUN SERPL-MCNC: 22 MG/DL (ref 8–23)
BUN/CREAT SERPL: 20.2 (ref 7–25)
CALCIUM SPEC-SCNC: 9.9 MG/DL (ref 8.6–10.5)
CHLORIDE SERPL-SCNC: 100 MMOL/L (ref 98–107)
CO2 SERPL-SCNC: 29.8 MMOL/L (ref 22–29)
CREAT SERPL-MCNC: 1.09 MG/DL (ref 0.57–1)
DEPRECATED RDW RBC AUTO: 48.1 FL (ref 37–54)
EGFRCR SERPLBLD CKD-EPI 2021: 56.5 ML/MIN/1.73
EOSINOPHIL # BLD AUTO: 0.2 10*3/MM3 (ref 0–0.4)
EOSINOPHIL NFR BLD AUTO: 2.2 % (ref 0.3–6.2)
ERYTHROCYTE [DISTWIDTH] IN BLOOD BY AUTOMATED COUNT: 15.3 % (ref 12.3–15.4)
GLOBULIN UR ELPH-MCNC: 3.5 GM/DL
GLUCOSE SERPL-MCNC: 118 MG/DL (ref 65–99)
HCT VFR BLD AUTO: 43.3 % (ref 34–46.6)
HGB BLD-MCNC: 13.3 G/DL (ref 12–15.9)
IMM GRANULOCYTES # BLD AUTO: 0.03 10*3/MM3 (ref 0–0.05)
IMM GRANULOCYTES NFR BLD AUTO: 0.3 % (ref 0–0.5)
LYMPHOCYTES # BLD AUTO: 1.94 10*3/MM3 (ref 0.7–3.1)
LYMPHOCYTES NFR BLD AUTO: 21.3 % (ref 19.6–45.3)
MCH RBC QN AUTO: 26.4 PG (ref 26.6–33)
MCHC RBC AUTO-ENTMCNC: 30.7 G/DL (ref 31.5–35.7)
MCV RBC AUTO: 86.1 FL (ref 79–97)
MONOCYTES # BLD AUTO: 0.6 10*3/MM3 (ref 0.1–0.9)
MONOCYTES NFR BLD AUTO: 6.6 % (ref 5–12)
NEUTROPHILS NFR BLD AUTO: 6.3 10*3/MM3 (ref 1.7–7)
NEUTROPHILS NFR BLD AUTO: 69.2 % (ref 42.7–76)
NRBC BLD AUTO-RTO: 0 /100 WBC (ref 0–0.2)
PLATELET # BLD AUTO: 292 10*3/MM3 (ref 140–450)
PMV BLD AUTO: 8.9 FL (ref 6–12)
POTASSIUM SERPL-SCNC: 3.9 MMOL/L (ref 3.5–5.2)
PROT SERPL-MCNC: 7.6 G/DL (ref 6–8.5)
RBC # BLD AUTO: 5.03 10*6/MM3 (ref 3.77–5.28)
SODIUM SERPL-SCNC: 139 MMOL/L (ref 136–145)
WBC NRBC COR # BLD AUTO: 9.11 10*3/MM3 (ref 3.4–10.8)

## 2024-08-20 PROCEDURE — 1126F AMNT PAIN NOTED NONE PRSNT: CPT | Performed by: INTERNAL MEDICINE

## 2024-08-20 PROCEDURE — 1160F RVW MEDS BY RX/DR IN RCRD: CPT | Performed by: INTERNAL MEDICINE

## 2024-08-20 PROCEDURE — 80053 COMPREHEN METABOLIC PANEL: CPT

## 2024-08-20 PROCEDURE — 99214 OFFICE O/P EST MOD 30 MIN: CPT | Performed by: INTERNAL MEDICINE

## 2024-08-20 PROCEDURE — 1159F MED LIST DOCD IN RCRD: CPT | Performed by: INTERNAL MEDICINE

## 2024-08-20 PROCEDURE — 85025 COMPLETE CBC W/AUTO DIFF WBC: CPT

## 2024-08-20 PROCEDURE — 36415 COLL VENOUS BLD VENIPUNCTURE: CPT

## 2024-08-20 NOTE — PROGRESS NOTES
Subjective     REASON FOR follow-up:      Atypical ductal hyperplasia in the right breast  Right breast DCIS s/p right partial mastectomy, 10 mm, grade 3, ER positive WV positive  Patient refused radiation.  Initially seen by Dr. Crocker at [place] and had second opinion at [facility].  DCIS on RT testing showed higher score of 4.8 but patient refused radiation  March 27, 2024: Patient started exemestane after Dr. Andino discussed with her again and encouraged her to start it.  April 30, 2024: Patient has severe arthralgias with exemestane and some mild hot flashes and wants to discontinue it.  She discontinued a week ago and her arthralgias have improved.  Will likely need to start her on tamoxifen in 4 weeks once the side effects are resolved      HISTORY OF PRESENT ILLNESS:      Patient is a 63 y.o. female with history of DCIS and atypical ductal hyperplasia of the right breast s/p right partial mastectomy ER/WV positive.  She was seen by Dr. Crocker at [place] who recommended radiation.  She then got a second opinion at the [facility] and apparently they suggested that she has low risk of recurrence and she ultimately decided against radiation.  She had a nodular area near the right nipple which was thought to be fat necrosis by Dr. Andino who saw her recently.  She had not started exemestane yet and Dr. Andino suggested she start exemestane.  She saw her on March 27, 2024.    Patient records were obtained by Dr. Andino.  From [facility].  She did undergo DCIS on RT testing.  Score returned as 4.8 which is in the elevated range.  However patient refused radiation despite results on the DCIS on radiation treatment testing a higher level of recurrence without adjuvant radiation.  Patient was seen by Dr. Miguel Colby.    Patient seen 5/1/2024 in follow-up, noting poor tolerance to exemestane with significant arthralgias interfering with quality of life.  She was  instructed to hold x 4 weeks and return today for reevaluation.    As she is reviewed back today she is still having some degree of stiffness and discomfort in her hand joints but overall is much better.  She has done some reading/research about tamoxifen since we last saw her and she does not feel comfortable taking this.  She would rather try different AI.    She denies other concerns at this time.    August 20, 2024: Patient is currently on letrozole.  She did not want to take tamoxifen because of concerns of side effects.  She has mild arthralgias but tolerable and she has got hot flashes which are also tolerable.  We discussed about consideration of Effexor but she does not want to take another drug.  Otherwise she is doing well patient had mammogram which showed evidence of some calcifications in the left breast as a result left diagnostic mammogram suggested.  Left diagnostic mammogram July 30, 2024 showed short-term follow-up recommended in 6 months to ensure stability of the probably benign left breast calcifications located near the postoperative change.    Oncology history:  Patient is a 63-year-old female was referred here by Dr. Audelia Andino for new diagnosis of atypical ductal hyperplasia.  Patient has been noncompliant with her screening mammograms for 10 years after which she presented for screening mammogram the screening mammogram showed abnormality and hence diagnostic imaging was done which showed 2 separate groups of calcifications in the right breast focal asymmetry without an ultrasound correlate.  She then had to undergo biopsy which showed atypical ductal hyperplasia.        The details are as follows    July 10, 2023 screening mammogram at [facility] showed    Right breast:  Focal asymmetry in the upper outer right breast middle 3rd depth with associated   grouped calcifications.  More anteriorly in the upper outer right breast is a smaller group of   calcifications.  Left breast:   Development of several course benign type left breast calcifications.  There are   developing vascular and secretory type calcifications visualized.  No architectural distortion or   suspicious mass.    August 14, 2023: Right breast diagnostic mammogram and ultrasound    IMPRESSION:  1. Subtle punctate and fine pleomorphic calcifications within the outer aspect of the right breast   which appear new from prior examination.  Recommend right breast stereotactic biopsy for definitive   tissue diagnosis.  2. Additional calcifications appear linear in distribution and appear to correlate to a small   vessel.  These are considered probably benign and attention on follow-up right diagnostic mammogram   6 months is recommended.  3. Right breast focal asymmetry appears like the more remote prior examination from 2013 when   accounting for differences in technique.  No sonographic correlate was identified.  This could also   be re-evaluated on the follow-up right diagnostic mammogram in 6 months.    BI-RADS 4: Suspicious    August 21, 2023: Right breast stereotactic biopsy at [facility]    Right breast, outer, stereotactic-guided core biopsy:               - Atypical ductal hyperplasia (ADH) involving an intraductal papilloma  - Usual ductal hyperplasia (UDH)  - Columnar cell change      October 7, 2023: Bilateral breast MRI    In the middle third of the lateral aspect of the right breast at the 9 o'clock position centered on the order of 6.5 cm from the nipple there is a focal signal void seen at the inferior margin of non-mass enhancement that measures on the order of 1.4 cm in the anterior to posterior dimension, 0.8 cm in the superior to inferior dimension and 0.7 cm in the medial to lateral dimension. This corresponds to the biopsy-proven site of ADH with the coil-shaped metallic clip.   No other areas of suspicious enhancement or morphology are seen in the right breast. I see no evidence for abnormal right breast  skin, nipple or chest wall enhancement and there is no evidence for right axillary or internal mammary chain adenopathy.  In the anterior one-third of the lateral aspect of the left breast centered at the 3:30 position on the order of 4 cm from the nipple there is linear enhancement that measures on the order of 1.5 cm in the anterior to posterior dimension, 0.6 cm in the superior to inferior dimension and 0.7 cm in the medial to lateral dimension. No definite mammographic correlate is appreciated.   No areas of abnormal enhancement or morphology are otherwise seen in the left breast. I see no evidence for abnormal left breast skin, nipple or chest wall enhancement and there is no evidence for left axillary or internal mammary chain adenopathy.  BIRADS 4: Suspicious.     October 27, 2023: Additional MRI imaging at [facility]  Additional images were taken of the left breast pre and post intravenous administration of gadolinium to evaluate an area of linear enhancement and determine if the area showed features of a vessel.   Following additional acquisition of the images the area of linear enhancement does not show evidence of being a vessel. It is not reliably attached to a vessel or branching structure. Therefore, continued biopsy  of the area of linear enhancement in the left breast at the 3:30 position is recommended.  BI-RADS Category 4: Suspicious.    November 14, 2023: Left breast MRI guided biopsy and right breast stereotactic biopsy    Left Breast MR-Guided Biopsy & Right Breast Stereotactic Biopsy ([facility]):     1.  Left breast, 3:00, MRI-guided biopsies for linear enhancement:               -Intraductal papilloma, 2 mm in greatest extent, present as fragments in 2 of 19 total cores.    -No atypical hyperplasia, in situ nor invasive carcinoma identified.  -Stoplight clip.     2.  Right breast, 11:00, stereotactic biopsies for calcifications: INTERMEDIATE GRADE DUCTAL CARCINOMA IN SITU (DCIS).                -Architectural patterns: Solid and cribriform with central necrosis and associated microcalcifications.               -DCIS is present in a single tissue core measuring up to 7 mm maximally.               -Additional findings include fibroadenomatoid hyperplasia.               -No evidence of in situ carcinoma identified.  -Bowtie clip.      ER+ (%, strong)  HI+ (%, strong)  Ki-67 12%      December 19, 2023: Left breast localized excisional biopsy, right breast needle localized excisional biopsy and right needle localized partial mastectomy with bilateral oncoplastic reduction    Synoptic Checklist   DCIS OF THE BREAST: Resection   8th Edition - Protocol posted: 3/23/2022DCIS OF THE BREAST: RESECTION - 6, 7, 8  SPECIMEN   Procedure  Excision (less than total mastectomy)   Specimen Laterality  Right   TUMOR   Tumor Site  Clock position     12 o'clock   Histologic Type  Ductal carcinoma in situ   Size (Extent) of DCIS  Estimated size (extent) of DCIS is at least (Millimeters): 10 mm   Architectural Patterns  Cribriform     Solid   Nuclear Grade  Grade III (high)   Necrosis  Present, focal (small foci or single cell necrosis)   Microcalcifications  Present in DCIS   MARGINS   Margin Status  All margins negative for DCIS   Distance from DCIS to Closest Margin  12 mm   Closest Margin(s) to DCIS  Inferior   Distance from DCIS to Anterior Margin  13 mm   Distance from DCIS to Posterior Margin  15 mm   Distance from DCIS to Superior Margin  20 mm   Distance from DCIS to Inferior Margin  12 mm   Distance from DCIS to Medial Margin  23 mm   Distance from DCIS to Lateral Margin  18 mm   REGIONAL LYMPH NODES   Regional Lymph Node Status  Not applicable (no regional lymph nodes submitted or found)   PATHOLOGIC STAGE CLASSIFICATION (pTNM, AJCC 8th Edition)   Reporting of pT, pN, and (when applicable) pM categories is based on information available to the pathologist at the time the report is issued. As per the  AJCC (Chapter 1, 8th Ed.) it is the managing physician’s responsibility to establish the final pathologic stage based upon all pertinent information, including but potentially not limited to this pathology report.   pT Category  pTis (DCIS)   pN Category  pN not assigned (no nodes submitted or found)   Breast Biomarker Testing Performed on Previous Biopsy     Estrogen Receptor (ER) Status  Positive   Percentage of Cells with Nuclear Positivity  %   Breast Biomarker Testing Performed on Previous Biopsy     Progesterone Receptor (PgR) Status  Positive   Percentage of Cells with Nuclear Positivity  %   Comment(s)  [id number]          Past Medical History:   Diagnosis Date    Acid reflux     Aneurysm of anterior communicating artery     Arthritis     Bilateral foot pain 06/04/2021    Brain aneurysm 2020    SEES NEUROLOGIST/ONELIA    Chest pain     HISTORY OF    Chronic kidney disease     COVID-19 11/09/2023    DCIS (ductal carcinoma in situ) of breast     RIGHT BREAST    Diabetes mellitus     PREDIABETIC    GERD (gastroesophageal reflux disease)     Heel pain     History of snoring     HLD (hyperlipidemia)     HTN (hypertension)     Intracranial aneurysm     Intraductal papilloma     LEFT BREAST    Prediabetes     Redness     LEFT ANKLE    RLS (restless legs syndrome)     Seasonal allergies     Sleep apnea     NO CPAP    SOB (shortness of breath)     HISTORY OF, POST ANEURYSM    Vertigo         Past Surgical History:   Procedure Laterality Date    BREAST BIOPSY Right 08/21/2023    Atypical Ductal Hyperplasia     (Faith Kimball)    BREAST BIOPSY Bilateral     NOVEMBER 2023    BREAST LUMPECTOMY Right 12/19/2023    Procedure: Right needle localized partial mastectomy and right breast needle localized excisional biopsy, Left breast needle-localized excisional biopsy;  Surgeon: Audelia Andino MD;  Location: [facility];  Service: General;  Laterality: Right;    BREAST SURGERY Bilateral 12/19/2023     Procedure: BILATERAL ONCOPLASTIC REDUCTION/MASTOPEXY;  Surgeon: Fahad Berger MD;  Location: [facility] OR;  Service: Plastics;  Laterality: Bilateral;    CARDIAC CATHETERIZATION      CEREBRAL ANGIOGRAM  2020    Brain aneurysm    CEREBRAL EMBOLIZATION  2019    COLONOSCOPY  2011    COLONOSCOPY N/A 02/01/2023    Procedure: COLONOSCOPY;  Surgeon: Alice Guerra MD;  Location: [facility] ENDOSCOPY;  Service: Gastroenterology;  Laterality: N/A;  HEMORRHOIDS    ENDOSCOPY N/A 02/01/2023    Procedure: ESOPHAGOGASTRODUODENOSCOPY WITH BIOPSIES, ESOPHAGEAL BALLOON DILATATION 15-18MM AND 18-20MM;  Surgeon: Alice Guerra MD;  Location: [facility] ENDOSCOPY;  Service: Gastroenterology;  Laterality: N/A;  GASTRIC POLYPS, SCHATZKI'S RING, GASTRITIS, HIATAL HERNIA    UPPER GASTROINTESTINAL ENDOSCOPY  2011        Current Outpatient Medications on File Prior to Visit   Medication Sig Dispense Refill    aspirin 81 MG oral suspension       atorvastatin (LIPITOR) 10 MG tablet Take 1 tablet by mouth Every Night.      azelastine (ASTELIN) 0.1 % nasal spray 1 spray As Needed.      Cannabidiol 100 MG/ML solution Take 1 mg by mouth Daily.      Cholecalciferol (Vitamin D3) 1.25 MG (70864 UT) tablet Take 1 tablet by mouth 2 (Two) Times a Week. NIGHT ON Sunday AND THURSDAY      CINNAMON PO Take 1 tablet by mouth Daily. HOLD ONE WEEK FOR SURGERY      dapagliflozin Propanediol (Farxiga) 10 MG tablet Take 10 mg by mouth Daily.      letrozole (FEMARA) 2.5 MG tablet Take 1 tablet by mouth Daily. 90 tablet 0    lisinopril-hydrochlorothiazide (PRINZIDE,ZESTORETIC) 20-25 MG per tablet Take 1 tablet by mouth Daily.      loratadine (CLARITIN) 10 MG tablet Take 1 tablet by mouth Daily.      montelukast (SINGULAIR) 10 MG tablet Take 1 tablet by mouth Every Night.      Multiple Vitamins-Minerals (Multi Adult Gummies) chewable tablet Chew 1 tablet Daily. HOLD FOR ONE WEEK FOR SURGERY      omeprazole (priLOSEC) 20 MG capsule 1 capsule  "every night at bedtime.       No current facility-administered medications on file prior to visit.        ALLERGIES:  No Known Allergies     Social History     Socioeconomic History    Marital status: Single    Number of children: 0   Tobacco Use    Smoking status: Never     Passive exposure: Never    Smokeless tobacco: Never   Vaping Use    Vaping status: Never Used   Substance and Sexual Activity    Alcohol use: Not Currently     Comment: OCC    Drug use: Never    Sexual activity: Not Currently     Partners: Male        Family History   Problem Relation Age of Onset    Hypertension Mother     Irritable bowel syndrome Mother     Aneurysm Mother     Hypertension Father     Heart disease Father     Diabetes Father     No Known Problems Sister     Hypertension Brother     Cancer Brother     Diabetes Brother     Esophageal cancer Brother     No Known Problems Brother     No Known Problems Brother     No Known Problems Brother     No Known Problems Brother     Aneurysm Maternal Aunt     Aneurysm Maternal Uncle     Cancer Maternal Uncle     Cancer Maternal Grandmother     Malig Hyperthermia Neg Hx          Objective     Vitals:    08/20/24 1242   BP: 149/81   Pulse: 104   Resp: 16   Temp: 98.3 °F (36.8 °C)   TempSrc: Oral   SpO2: 96%   Weight: 98.7 kg (217 lb 11.2 oz)   Height: 158.8 cm (62.52\")   PainSc: 0-No pain           8/20/2024    12:40 PM   Current Status   ECOG score 0       Physical Exam           This patient's ACP documentation is up to date, and there's nothing further left to document.      CONSTITUTIONAL:  Vital signs reviewed.  No distress, looks comfortable.  RESPIRATORY:  Normal respiratory effort.  Lungs clear to auscultation bilaterally.  BREAST: Right breast: No skin changes, no evidence of breast mass, no nipple discharge, no evidence of any right axillary adenopathy or right supraclavicular adenopathy  Left breast: No evidence of any skin changes, no evidence of any left breast mass and no evidence " of left nipple discharge as well as no left axillary adenopathy or left supraclavicular adenopathy.   CARDIOVASCULAR:  Normal S1, S2.  No murmurs rubs or gallops.  No significant lower extremity edema.  GASTROINTESTINAL: Abdomen appears unremarkable.  Nontender.  No hepatomegaly.  No splenomegaly.  LYMPHATIC:  No cervical, supraclavicular, axillary lymphadenopathy.  SKIN:  Warm.  No rashes.  PSYCHIATRIC:  Normal judgment and insight.  Normal mood and affect.    I have reexamined the patient and the results are consistent with the previously documented exam. Leslie Fritz MD       RECENT LABS:  Results from last 7 days   Lab Units 08/20/24  1230   WBC 10*3/mm3 9.11   NEUTROS ABS 10*3/mm3 6.30   HEMOGLOBIN g/dL 13.3   HEMATOCRIT % 43.3   PLATELETS 10*3/mm3 292     Results from last 7 days   Lab Units 08/20/24  1230   SODIUM mmol/L 139   POTASSIUM mmol/L 3.9   CHLORIDE mmol/L 100   CO2 mmol/L 29.8*   BUN mg/dL 22   CREATININE mg/dL 1.09*   CALCIUM mg/dL 9.9   ALBUMIN g/dL 4.1   BILIRUBIN mg/dL 0.4   ALK PHOS U/L 131*   ALT (SGPT) U/L 9   AST (SGOT) U/L 22   GLUCOSE mg/dL 118*               Assessment & Plan     #.  Pathologic Tis Nx DCIS of the right breast, 10 mm, grade 3, ER positive MA positive s/p right partial mastectomy December 22, 2023, negative margins.  Patient also has atypical ductal hyperplasia of the right breast and the left intraductal papilloma by biopsy.    Patient is referred here for evaluation of chemoprophylaxis.  Given ER/MA positive DCIS and being menopausal we will start exemestane  Tamoxifen causes hot flashes, rare chance for uterine cancer, blood clots, DVT, PE, hair thinning, rare chance for thrombocytopenia, cataracts.  Aromatase inhibitors can cause arthralgias, hot flashes, decrease in bone density, rare chance for diarrhea, also discussed about hot flashes with it.  Evista is approved for osteopenia and can also be used for prophylaxis with fewer side effects except hot flashes and  rare chance for cataracts but it can improve bone density.    Patient will require bone density  Patient has been referred to radiation oncology,  If patient decides to go on radiation then she can start exemestane at the end of radiation.  She was seen by Dr. Crocker at [place] who recommended radiation.  She then got a second opinion at the [facility] and apparently they suggested that she has low risk of recurrence and she ultimately decided against radiation.  She had a nodular area near the right nipple which was thought to be fat necrosis by Dr. Andino who saw her recently.  She had not started exemestane yet and Dr. Andino suggested her to start exemestane.  She saw her on March 27, 2024.  Patient records were obtained by Dr. Andino.  From [facility].  She did undergo DCIS on RT testing.  Score returned as 4.8 which is in the elevated range.  However patient refused radiation despite results on the D CIS on radiation treatment testing a higher level of recurrence without adjuvant radiation.  Patient was seen by Dr.Alden Colby  5/1/2024 Patient started exemestane but had severe arthralgias.  She took it for 4 weeks starting March 27 and given severe arthralgias she discontinued a week ago.  Her arthralgias are improving though not completely gone.  We discussed about consideration of tamoxifen as it does not cause arthralgias though it may cause some hot flashes.  We again discussed side effects of tamoxifen.  Will plan to start it in 4 weeks when she sees our nurse practitioner.  6/4/2024 per review today, patient's arthralgias overall significantly improved though she is still having some degree of discomfort in her hands.  She has done more reading/research about tamoxifen and does not want to take this drug.  She would rather try different AI.  We discussed switching to Femara and also adding tart cherry extract to try to help her continued low-level arthralgias.  August 20,  2024: Patient currently doing well.  She had mammogram July 15, 2024 which we reviewed and she has got some calcifications in the left breast for which additional diagnostic mammogram suggested.  July 30, 2020 for additional diagnostic mammogram on the left breast did not show any changes..  Repeat diagnostic mammogram suggested in 6 months    Plan  Patient seen by radiation oncology Dr. Crocker and refused radiation  Subsequently had second opinion at the [facility] with radiation oncology and they did DCIS on RT score which was 4.8 and high risk of recurrence without adjuvant radiation but patient refused radiation  Patient took exemestane for 4 weeks and had severe arthralgias with a level of 8 or 9 on a 1-10 scale and discontinued    Patient does not want to take tamoxifen.  trying a different AI with addition of tart cherry extract.  She is agreeable to this.    Continue letrozole as she is tolerating it well.  Repeat left diagnostic mammogram in 6 months as suggested by radiology  Nurse practitioner visit in 3 months with labs  Follow-up with Dr. Marte in 6 months with labs    MD Dr. Audelia Simpson

## 2024-10-09 RX ORDER — LETROZOLE 2.5 MG/1
2.5 TABLET, FILM COATED ORAL DAILY
Qty: 90 TABLET | Refills: 3 | Status: SHIPPED | OUTPATIENT
Start: 2024-10-09

## 2024-11-26 ENCOUNTER — TRANSCRIBE ORDERS (OUTPATIENT)
Dept: ADMINISTRATIVE | Facility: HOSPITAL | Age: 64
End: 2024-11-26
Payer: MEDICARE

## 2024-11-26 ENCOUNTER — HOSPITAL ENCOUNTER (OUTPATIENT)
Dept: GENERAL RADIOLOGY | Facility: HOSPITAL | Age: 64
Discharge: HOME | End: 2024-11-26
Payer: MEDICARE

## 2024-11-26 DIAGNOSIS — M25.551 PAIN IN RIGHT HIP: ICD-10-CM

## 2024-11-26 DIAGNOSIS — M54.50 LUMBAR BACK PAIN: Primary | ICD-10-CM

## 2024-11-26 DIAGNOSIS — M54.50 LUMBAR BACK PAIN: ICD-10-CM

## 2024-11-26 PROCEDURE — 73522 X-RAY EXAM HIPS BI 3-4 VIEWS: CPT

## 2024-11-26 PROCEDURE — 72100 X-RAY EXAM L-S SPINE 2/3 VWS: CPT

## 2024-12-27 ENCOUNTER — TRANSCRIBE ORDERS (OUTPATIENT)
Dept: ADMINISTRATIVE | Facility: HOSPITAL | Age: 64
End: 2024-12-27
Payer: MEDICARE

## 2024-12-27 DIAGNOSIS — R73.01 IMPAIRED FASTING GLUCOSE: ICD-10-CM

## 2024-12-27 DIAGNOSIS — N18.30 STAGE 3 CHRONIC KIDNEY DISEASE, UNSPECIFIED WHETHER STAGE 3A OR 3B CKD: Primary | ICD-10-CM

## 2024-12-27 DIAGNOSIS — I10 ESSENTIAL HYPERTENSION, MALIGNANT: ICD-10-CM

## 2024-12-27 DIAGNOSIS — N28.9 DISORDER OF KIDNEY AND URETER, UNSPECIFIED: ICD-10-CM

## 2024-12-27 DIAGNOSIS — E78.00 PURE HYPERCHOLESTEROLEMIA: ICD-10-CM

## 2024-12-30 ENCOUNTER — LAB (OUTPATIENT)
Dept: LAB | Facility: HOSPITAL | Age: 64
End: 2024-12-30
Payer: MEDICARE

## 2024-12-30 DIAGNOSIS — I10 ESSENTIAL HYPERTENSION, MALIGNANT: ICD-10-CM

## 2024-12-30 DIAGNOSIS — N18.30 STAGE 3 CHRONIC KIDNEY DISEASE, UNSPECIFIED WHETHER STAGE 3A OR 3B CKD: ICD-10-CM

## 2024-12-30 DIAGNOSIS — E78.00 PURE HYPERCHOLESTEROLEMIA: ICD-10-CM

## 2024-12-30 DIAGNOSIS — R73.01 IMPAIRED FASTING GLUCOSE: ICD-10-CM

## 2024-12-30 DIAGNOSIS — N28.9 DISORDER OF KIDNEY AND URETER, UNSPECIFIED: ICD-10-CM

## 2024-12-30 LAB
25(OH)D3 SERPL-MCNC: 50.1 NG/ML (ref 30–100)
ALBUMIN SERPL-MCNC: 4 G/DL (ref 3.5–5.2)
ANION GAP SERPL CALCULATED.3IONS-SCNC: 9 MMOL/L (ref 5–15)
BACTERIA UR QL AUTO: NORMAL /HPF
BASOPHILS # BLD AUTO: 0.05 10*3/MM3 (ref 0–0.2)
BASOPHILS NFR BLD AUTO: 0.5 % (ref 0–1.5)
BILIRUB UR QL STRIP: NEGATIVE
BUN SERPL-MCNC: 21 MG/DL (ref 8–23)
BUN/CREAT SERPL: 17.2 (ref 7–25)
CALCIUM SPEC-SCNC: 10.2 MG/DL (ref 8.6–10.5)
CHLORIDE SERPL-SCNC: 101 MMOL/L (ref 98–107)
CLARITY UR: CLEAR
CO2 SERPL-SCNC: 31 MMOL/L (ref 22–29)
COLOR UR: YELLOW
CREAT SERPL-MCNC: 1.22 MG/DL (ref 0.57–1)
CREAT UR-MCNC: 105 MG/DL
DEPRECATED RDW RBC AUTO: 44.3 FL (ref 37–54)
EGFRCR SERPLBLD CKD-EPI 2021: 49 ML/MIN/1.73
EOSINOPHIL # BLD AUTO: 0.21 10*3/MM3 (ref 0–0.4)
EOSINOPHIL NFR BLD AUTO: 2 % (ref 0.3–6.2)
ERYTHROCYTE [DISTWIDTH] IN BLOOD BY AUTOMATED COUNT: 15.1 % (ref 12.3–15.4)
GLUCOSE SERPL-MCNC: 126 MG/DL (ref 65–99)
GLUCOSE UR STRIP-MCNC: ABNORMAL MG/DL
HCT VFR BLD AUTO: 40.9 % (ref 34–46.6)
HGB BLD-MCNC: 13.5 G/DL (ref 12–15.9)
HGB UR QL STRIP.AUTO: NEGATIVE
HYALINE CASTS UR QL AUTO: NORMAL /LPF
IMM GRANULOCYTES # BLD AUTO: 0.03 10*3/MM3 (ref 0–0.05)
IMM GRANULOCYTES NFR BLD AUTO: 0.3 % (ref 0–0.5)
KETONES UR QL STRIP: NEGATIVE
LEUKOCYTE ESTERASE UR QL STRIP.AUTO: NEGATIVE
LYMPHOCYTES # BLD AUTO: 2.12 10*3/MM3 (ref 0.7–3.1)
LYMPHOCYTES NFR BLD AUTO: 19.9 % (ref 19.6–45.3)
MCH RBC QN AUTO: 26.6 PG (ref 26.6–33)
MCHC RBC AUTO-ENTMCNC: 33 G/DL (ref 31.5–35.7)
MCV RBC AUTO: 80.7 FL (ref 79–97)
MONOCYTES # BLD AUTO: 0.77 10*3/MM3 (ref 0.1–0.9)
MONOCYTES NFR BLD AUTO: 7.2 % (ref 5–12)
NEUTROPHILS NFR BLD AUTO: 7.45 10*3/MM3 (ref 1.7–7)
NEUTROPHILS NFR BLD AUTO: 70.1 % (ref 42.7–76)
NITRITE UR QL STRIP: NEGATIVE
NRBC BLD AUTO-RTO: 0 /100 WBC (ref 0–0.2)
PH UR STRIP.AUTO: 6 [PH] (ref 5–8)
PHOSPHATE SERPL-MCNC: 3.1 MG/DL (ref 2.5–4.5)
PLATELET # BLD AUTO: 344 10*3/MM3 (ref 140–450)
PMV BLD AUTO: 9.6 FL (ref 6–12)
POTASSIUM SERPL-SCNC: 3.9 MMOL/L (ref 3.5–5.2)
PROT ?TM UR-MCNC: 10 MG/DL
PROT UR QL STRIP: NEGATIVE
PROT/CREAT UR: 0.1 MG/G{CREAT}
PTH-INTACT SERPL-MCNC: 63 PG/ML (ref 15–65)
RBC # BLD AUTO: 5.07 10*6/MM3 (ref 3.77–5.28)
RBC # UR STRIP: NORMAL /HPF
REF LAB TEST METHOD: NORMAL
SODIUM SERPL-SCNC: 141 MMOL/L (ref 136–145)
SP GR UR STRIP: 1.03 (ref 1–1.03)
SQUAMOUS #/AREA URNS HPF: NORMAL /HPF
UROBILINOGEN UR QL STRIP: ABNORMAL
WBC # UR STRIP: NORMAL /HPF
WBC NRBC COR # BLD AUTO: 10.63 10*3/MM3 (ref 3.4–10.8)

## 2024-12-30 PROCEDURE — 85025 COMPLETE CBC W/AUTO DIFF WBC: CPT

## 2024-12-30 PROCEDURE — 36415 COLL VENOUS BLD VENIPUNCTURE: CPT

## 2024-12-30 PROCEDURE — 80069 RENAL FUNCTION PANEL: CPT

## 2024-12-30 PROCEDURE — 82570 ASSAY OF URINE CREATININE: CPT

## 2024-12-30 PROCEDURE — 84156 ASSAY OF PROTEIN URINE: CPT

## 2024-12-30 PROCEDURE — 83970 ASSAY OF PARATHORMONE: CPT

## 2024-12-30 PROCEDURE — 81001 URINALYSIS AUTO W/SCOPE: CPT

## 2024-12-30 PROCEDURE — 82306 VITAMIN D 25 HYDROXY: CPT

## 2025-01-21 ENCOUNTER — HOSPITAL ENCOUNTER (OUTPATIENT)
Dept: MAMMOGRAPHY | Facility: HOSPITAL | Age: 65
Discharge: HOME | End: 2025-01-21
Admitting: NURSE PRACTITIONER
Payer: MEDICARE

## 2025-01-21 PROCEDURE — G0279 TOMOSYNTHESIS, MAMMO: HCPCS

## 2025-01-21 PROCEDURE — 77065 DX MAMMO INCL CAD UNI: CPT

## 2025-01-23 ENCOUNTER — TELEPHONE (OUTPATIENT)
Dept: SURGERY | Facility: CLINIC | Age: 65
End: 2025-01-23
Payer: MEDICARE

## 2025-01-23 NOTE — TELEPHONE ENCOUNTER
Called and left message with normal left diagnostic mammogram consistent with fat necrosis.  She has a follow-up with me in March

## 2025-02-17 NOTE — PROGRESS NOTES
Chief Complaint   Patient presents with    Follow-up       2/18/2025, interval history:  Patient new to me.  Previous Dr. Fritz patient.  Tolerating letrozole quite well.  Has some arthralgia, is tolerable per patient.  Tart cherry extract helps some.  Has hot flashes mainly at night.  However does not want to try any pharmaceutical measures for now hot flashes.  So      Oncology/Hematology History   Ductal carcinoma in situ (DCIS) of right breast   1/12/2023 Imaging    Screening MMG ([facility]):  The current examination reveals no new abnormalities or suspicious changes in appearance.  BI-RADS 2     7/10/2023 Initial Diagnosis    Ductal carcinoma in situ (DCIS) of right breast     7/10/2023 Imaging    Screening MMG with Reggie ([facility]):   Right breast:  Focal asymmetry in the upper outer right breast middle 3rd depth with associated   grouped calcifications.  More anteriorly in the upper outer right breast is a smaller group of   calcifications.  Left breast:  Development of several course benign type left breast calcifications.  There are   developing vascular and secretory type calcifications visualized.  No architectural distortion or   suspicious mass.  BI-RADS 0: Incomplete     8/14/2023 Imaging    Right Diagnostic MMG with Reggie & Right Breast US ([facility]):  MMG:  Right diagnostic mammogram:      There are subtle punctate and fine pleomorphic calcifications within the outer aspect of the right   breast which appears new from the prior examination.   There are punctate, round, and coarse heterogeneous calcifications which appear to be along a   linear distribution associated with a vessel.  These correspond to the upper outer aspect of the   right breast.  There is a subtle focal asymmetry within the upper-outer quadrant of the right breast on current   examination which appears fairly similar to the prior more remote examination from 2013 when   accounting for differences in  technique.  US:  Sonographic grayscale and color Doppler images of the right breast were obtained with   representative examples submitted to PACs for interpretation.  Imaging at the 11 o'clock position   corresponding to the focal asymmetry within the upper-outer quadrant of the right breast   demonstrates no sonographic correlate.  There are incidental intramammary lymph nodes at the 11   o'clock position 13 cm from the nipple.   IMPRESSION:  1. Subtle punctate and fine pleomorphic calcifications within the outer aspect of the right breast   which appear new from prior examination.  Recommend right breast stereotactic biopsy for definitive   tissue diagnosis.  2. Additional calcifications appear linear in distribution and appear to correlate to a small   vessel.  These are considered probably benign and attention on follow-up right diagnostic mammogram   6 months is recommended.  3. Right breast focal asymmetry appears like the more remote prior examination from 2013 when   accounting for differences in technique.  No sonographic correlate was identified.  This could also   be re-evaluated on the follow-up right diagnostic mammogram in 6 months.    BI-RADS 4: Suspicious     8/21/2023 Biopsy    Right Breast, Stereotactic Biopsy ( [facility]):  Right breast, outer, stereotactic-guided core biopsy:               - Atypical ductal hyperplasia (ADH) involving an intraductal papilloma  - Usual ductal hyperplasia (UDH)  - Columnar cell change  - Microcalcifications   Comment:  The above diagnosis was rendered in expert consultation by Mery Sarah MD , [facility] .  -Hydrocoil clip.     10/7/2023 Imaging    Bilateral Breast MRI ( [facility]):  In the middle third of the lateral aspect of the right breast at the 9 o'clock position centered on the order of 6.5 cm from the nipple there is a focal signal void seen at the inferior margin of non-mass enhancement that measures on the order of 1.4 cm in the anterior to  posterior dimension, 0.8 cm in the superior to inferior dimension and 0.7 cm in the medial to lateral dimension. This corresponds to the biopsy-proven site of ADH with the coil-shaped metallic clip.   No other areas of suspicious enhancement or morphology are seen in the right breast. I see no evidence for abnormal right breast skin, nipple or chest wall enhancement and there is no evidence for right axillary or internal mammary chain adenopathy.  In the anterior one-third of the lateral aspect of the left breast centered at the 3:30 position on the order of 4 cm from the nipple there is linear enhancement that measures on the order of 1.5 cm in the anterior to posterior dimension, 0.6 cm in the superior to inferior dimension and 0.7 cm in the medial to lateral dimension. No definite mammographic correlate is appreciated.   No areas of abnormal enhancement or morphology are otherwise seen in the left breast. I see no evidence for abnormal left breast skin, nipple or chest wall enhancement and there is no evidence for left axillary or internal mammary chain adenopathy.  BIRADS 4: Suspicious.      10/27/2023 Imaging    Additional MRI Images ([facility]):  Additional images were taken of the left breast pre and post intravenous administration of gadolinium to evaluate an area of linear enhancement and determine if the area showed features of a vessel.   Following additional acquisition of the images the area of linear enhancement does not show evidence of being a vessel. It is not reliably attached to a vessel or branching structure. Therefore, continued biopsy  of the area of linear enhancement in the left breast at the 3:30 position is recommended.  BI-RADS Category 4: Suspicious.     11/14/2023 Biopsy    Left Breast MR-Guided Biopsy & Right Breast Stereotactic Biopsy [facility]    1.  Left breast, 3:00, MRI-guided biopsies for linear enhancement:               -Intraductal papilloma, 2 mm in greatest extent, present as  fragments in 2 of 19 total cores.    -No atypical hyperplasia, in situ nor invasive carcinoma identified.  -Stoplight clip.     2.  Right breast, 11:00, stereotactic biopsies for calcifications: INTERMEDIATE GRADE DUCTAL CARCINOMA IN SITU (DCIS).               -Architectural patterns: Solid and cribriform with central necrosis and associated microcalcifications.               -DCIS is present in a single tissue core measuring up to 7 mm maximally.               -Additional findings include fibroadenomatoid hyperplasia.               -No evidence of in situ carcinoma identified.  -Bowtie clip.     ER+ (%, strong)  KY+ (%, strong)  Ki-67 12%     12/19/2023 Surgery    Left breast needle-localized excisional biopsy, right breast needle-localized excisional biopsy, and right needle-localized partial mastectomy with bilateral oncoplastic reduction    1. Left Breast, Needle Localized Excisional Biopsy (18 G):               A. Breast parenchyma with micropapilloma and usual ductal hyperplasia.               B. Clip and biopsy site changes are present.   C. Unremarkable skin.                 2. Right Breast, 10:00, Needle Localized Excisional Biopsy (20 G):               A. Breast parenchyma with focal atypical ductal hyperplasia.               B. Clip and biopsy site changes are present.               C. Unremarkable skin.     3. Right Breast, 10:00, Additional Superior and Medial Margins, Reexcision:               A. Breast parenchyma with no significant histopathologic changes       (additional 11 mm of benign tissue).     4. Right Breast, 10:00, Additional Posterior Margin, Reexcision:               A. Breast parenchyma with usual ductal hyperplasia involving an intraductal papilloma       (additional 8 mm of benign tissue).     5. Right Breast, 10:00, Additional Lateral and Inferior Margins, Reexcision:               A. Breast parenchyma with no significant histopathologic changes       (additional 10 mm of  benign tissue).     6. Right Breast, 12:00, Needle Localized Partial Mastectomy (37 G):               A. INTERMEDIATE TO HIGH-GRADE DUCTAL CARCINOMA IN SITU (DCIS):  1. Solid and cribriform types with associated necrosis and calcifications.  2. Extent of DCIS: 10 mm (based on slices involved).  3. Margins are negative for in situ carcinoma; closest distance: DCIS is present 4 mm from the       inferior margin (superseded by specimen #7).  B. Clip and biopsy site changes are present adjacent to DCIS.  C. See synoptic report.     7. Right Breast, 12:00, Additional Inferior and Lateral Margins, Reexcision:               A. Breast parenchyma with no significant histopathologic changes       (additional 8 mm of tissue free of carcinoma).     8. Right Breast, 12:00, Additional Anterior, Superior, and Medial Margins, Reexcision:               A. Breast parenchyma with no significant histopathologic changes       (additional 8 mm of tissue free of carcinoma).     9. Left Breast Tissue, Reduction Mammoplasty (536 G):               A. Benign skin and breast parenchyma with no significant histopathologic changes.     10. Right Breast Tissue, Reduction Mammoplasty (613 G):   A. Benign skin and breast parenchyma with no significant histopathologic changes.     7/15/2024 Imaging    Bilateral screening mammogram at [facility].  There are scattered areas of fibroglandular density.     New post-surgical changes in both breasts.     Loosely grouped indeterminate calcifications in the lower outer left  breast, middle to posterior depth, which are likely due to postoperative  change.     There are no suspicious masses or architectural distortion in the right  breast.     IMPRESSION:  Indeterminate left breast calcifications, which are likely due to  postoperative change. Recommend further evaluation with left diagnostic  mammogram.     BI-RADS ASSESSMENT: BI-RADS 0     7/30/2024 Imaging    Left diagnostic mammogram at [facility]  There are scattered  areas of fibroglandular density.     Loosely grouped calcifications in the lower outer left breast, middle to  posterior depth, are associated with fat density, likely representing  developing dystrophic calcifications related to postoperative change/fat  necrosis. These are considered probably benign.     IMPRESSION:  Recommend short-term follow-up left diagnostic mammogram in 6 months to  ensure benign evolution or stability of probably benign left breast  calcifications, likely related to postoperative change. I discussed  results with the patient following the exam.     BI-RADS ASSESSMENT: BI-RADS 3.              Current Outpatient Medications on File Prior to Visit   Medication Sig Dispense Refill    aspirin 81 MG oral suspension       azelastine (ASTELIN) 0.1 % nasal spray 1 spray As Needed.      Cannabidiol 100 MG/ML solution Take 1 mg by mouth Daily.      CINNAMON PO Take 1 tablet by mouth Daily. HOLD ONE WEEK FOR SURGERY      dapagliflozin Propanediol (Farxiga) 10 MG tablet Take 10 mg by mouth Daily.      letrozole (FEMARA) 2.5 MG tablet TAKE 1 TABLET DAILY 90 tablet 3    lisinopril-hydrochlorothiazide (PRINZIDE,ZESTORETIC) 20-25 MG per tablet Take 1 tablet by mouth Daily.      loratadine (CLARITIN) 10 MG tablet Take 1 tablet by mouth Daily.      montelukast (SINGULAIR) 10 MG tablet Take 1 tablet by mouth Every Night.      Multiple Vitamins-Minerals (Multi Adult Gummies) chewable tablet Chew 1 tablet Daily. HOLD FOR ONE WEEK FOR SURGERY      omeprazole (priLOSEC) 20 MG capsule 1 capsule every night at bedtime.      [DISCONTINUED] atorvastatin (LIPITOR) 10 MG tablet Take 1 tablet by mouth Every Night.      atorvastatin (LIPITOR) 20 MG tablet Take 1 tablet by mouth Daily.      [DISCONTINUED] Cholecalciferol (Vitamin D3) 1.25 MG (74547 UT) tablet Take 1 tablet by mouth 2 (Two) Times a Week. NIGHT ON Sunday AND THURSDAY       No current facility-administered medications on file prior to visit.        ALLERGIES:  " No Known Allergies       I have reviewed Past Medical, Surgical History, Social History, Meds and Allergies.     REVIEW OF SYSTEMS:  See interval History      Objective     Vitals:    02/18/25 0943   BP: 138/82   Pulse: 100   Resp: 16   Temp: 98.1 °F (36.7 °C)   TempSrc: Oral   SpO2: 95%   Weight: 102 kg (224 lb 9.6 oz)   Height: 158.8 cm (62.52\")   PainSc: 0-No pain             2/18/2025     9:51 AM   Current Status   ECOG score 0     Physical Exam  Constitutional:       Appearance: Normal appearance. She is obese.   HENT:      Head: Normocephalic and atraumatic.      Mouth/Throat:      Mouth: Mucous membranes are moist.      Pharynx: Oropharynx is clear.   Eyes:      Extraocular Movements: Extraocular movements intact.      Pupils: Pupils are equal, round, and reactive to light.   Cardiovascular:      Rate and Rhythm: Normal rate and regular rhythm.   Pulmonary:      Effort: Pulmonary effort is normal.      Breath sounds: Normal breath sounds.   Chest:       Abdominal:      General: Bowel sounds are normal.      Palpations: Abdomen is soft.   Musculoskeletal:      Cervical back: Normal range of motion and neck supple.   Neurological:      General: No focal deficit present.      Mental Status: She is alert and oriented to person, place, and time.   Psychiatric:         Mood and Affect: Mood normal.         Behavior: Behavior normal.         RECENT LABS:  Results from last 7 days   Lab Units 02/18/25  0932   WBC 10*3/mm3 7.22   NEUTROS ABS 10*3/mm3 5.06   HEMOGLOBIN g/dL 12.8   HEMATOCRIT % 42.3   PLATELETS 10*3/mm3 289                       Assessment & Plan     *DCIS of the right breast, ER/LA positive  19 December 2023 status post right breast lumpectomy.  Final pathology DCIS, 10 mm.  Cribriform and solid.  Grade 3.  Margins negative.  ER 91 to 100%, LA 91 to 100%  She was seen by Dr. Crocker at [place] who recommended radiation.  She then got a second opinion at the [facility] and apparently they " suggested that she has low risk of recurrence and she ultimately decided against radiation.  DCISion RT-4.8 which is in the elevated range.  However, patient refused radiation  March 2024-patient started AI.  Initially started exemestane, took for 4 weeks discontinued due to arthralgia.  Switched to tamoxifen (but pt never took it), switched to letrozole due to patient not wanting to continue tamoxifen due to her research/reading about tamoxifen side effect.  August 20, 2024: Patient currently doing well.  She had mammogram July 15, 2024 which we reviewed and she has got some calcifications in the left breast for which additional diagnostic mammogram suggested.  July 30, 2020 for additional diagnostic mammogram on the left breast did not show any changes..  Repeat diagnostic mammogram suggested in 6 month  2/18/2025: Patient establish care with me.  Currently on letrozole tolerating it well.  Has hot flashes, mainly at night.  Not interested in pharmaceutical measures.  Reviewed results of left breast diagnostic mammogram, fat necrosis.  BI-RADS 2.    *AI induced arthralgia, stable  Reports tart cherry extract helps some    Plan  Continue letrozole as she is tolerating it well.  Continue to f/up with Rex Anderson/ Dr Sian Lazo for b/l screening mammo in July 2025  Follow-up with Dr. Marte in 6 months with labs    Mary Marte MD

## 2025-02-18 ENCOUNTER — LAB (OUTPATIENT)
Dept: LAB | Facility: HOSPITAL | Age: 65
End: 2025-02-18
Payer: MEDICARE

## 2025-02-18 ENCOUNTER — OFFICE VISIT (OUTPATIENT)
Dept: ONCOLOGY | Facility: CLINIC | Age: 65
End: 2025-02-18
Payer: MEDICARE

## 2025-02-18 VITALS
HEIGHT: 63 IN | BODY MASS INDEX: 39.8 KG/M2 | DIASTOLIC BLOOD PRESSURE: 82 MMHG | TEMPERATURE: 98.1 F | HEART RATE: 100 BPM | WEIGHT: 224.6 LBS | OXYGEN SATURATION: 95 % | RESPIRATION RATE: 16 BRPM | SYSTOLIC BLOOD PRESSURE: 138 MMHG

## 2025-02-18 DIAGNOSIS — N60.99 ATYPICAL DUCTAL HYPERPLASIA OF BREAST: ICD-10-CM

## 2025-02-18 DIAGNOSIS — D05.11 DUCTAL CARCINOMA IN SITU (DCIS) OF RIGHT BREAST: ICD-10-CM

## 2025-02-18 DIAGNOSIS — Z79.899 HIGH RISK MEDICATION USE: ICD-10-CM

## 2025-02-18 DIAGNOSIS — N60.99 ATYPICAL DUCTAL HYPERPLASIA OF BREAST: Primary | ICD-10-CM

## 2025-02-18 LAB
ALBUMIN SERPL-MCNC: 3.8 G/DL (ref 3.5–5.2)
ALBUMIN/GLOB SERPL: 1.1 G/DL
ALP SERPL-CCNC: 120 U/L (ref 39–117)
ALT SERPL W P-5'-P-CCNC: 13 U/L (ref 1–33)
ANION GAP SERPL CALCULATED.3IONS-SCNC: 11.6 MMOL/L (ref 5–15)
AST SERPL-CCNC: 20 U/L (ref 1–32)
BASOPHILS # BLD AUTO: 0.04 10*3/MM3 (ref 0–0.2)
BASOPHILS NFR BLD AUTO: 0.6 % (ref 0–1.5)
BILIRUB SERPL-MCNC: 0.5 MG/DL (ref 0–1.2)
BUN SERPL-MCNC: 25 MG/DL (ref 8–23)
BUN/CREAT SERPL: 20.8 (ref 7–25)
CALCIUM SPEC-SCNC: 10.2 MG/DL (ref 8.6–10.5)
CHLORIDE SERPL-SCNC: 102 MMOL/L (ref 98–107)
CO2 SERPL-SCNC: 27.4 MMOL/L (ref 22–29)
CREAT SERPL-MCNC: 1.2 MG/DL (ref 0.57–1)
DEPRECATED RDW RBC AUTO: 47.6 FL (ref 37–54)
EGFRCR SERPLBLD CKD-EPI 2021: 50 ML/MIN/1.73
EOSINOPHIL # BLD AUTO: 0.16 10*3/MM3 (ref 0–0.4)
EOSINOPHIL NFR BLD AUTO: 2.2 % (ref 0.3–6.2)
ERYTHROCYTE [DISTWIDTH] IN BLOOD BY AUTOMATED COUNT: 15.5 % (ref 12.3–15.4)
GLOBULIN UR ELPH-MCNC: 3.4 GM/DL
GLUCOSE SERPL-MCNC: 168 MG/DL (ref 65–99)
HCT VFR BLD AUTO: 42.3 % (ref 34–46.6)
HGB BLD-MCNC: 12.8 G/DL (ref 12–15.9)
IMM GRANULOCYTES # BLD AUTO: 0.02 10*3/MM3 (ref 0–0.05)
IMM GRANULOCYTES NFR BLD AUTO: 0.3 % (ref 0–0.5)
LYMPHOCYTES # BLD AUTO: 1.45 10*3/MM3 (ref 0.7–3.1)
LYMPHOCYTES NFR BLD AUTO: 20.1 % (ref 19.6–45.3)
MCH RBC QN AUTO: 25.7 PG (ref 26.6–33)
MCHC RBC AUTO-ENTMCNC: 30.3 G/DL (ref 31.5–35.7)
MCV RBC AUTO: 84.9 FL (ref 79–97)
MONOCYTES # BLD AUTO: 0.49 10*3/MM3 (ref 0.1–0.9)
MONOCYTES NFR BLD AUTO: 6.8 % (ref 5–12)
NEUTROPHILS NFR BLD AUTO: 5.06 10*3/MM3 (ref 1.7–7)
NEUTROPHILS NFR BLD AUTO: 70 % (ref 42.7–76)
NRBC BLD AUTO-RTO: 0 /100 WBC (ref 0–0.2)
PLATELET # BLD AUTO: 289 10*3/MM3 (ref 140–450)
PMV BLD AUTO: 9 FL (ref 6–12)
POTASSIUM SERPL-SCNC: 4.4 MMOL/L (ref 3.5–5.2)
PROT SERPL-MCNC: 7.2 G/DL (ref 6–8.5)
RBC # BLD AUTO: 4.98 10*6/MM3 (ref 3.77–5.28)
SODIUM SERPL-SCNC: 141 MMOL/L (ref 136–145)
WBC NRBC COR # BLD AUTO: 7.22 10*3/MM3 (ref 3.4–10.8)

## 2025-02-18 PROCEDURE — 36415 COLL VENOUS BLD VENIPUNCTURE: CPT

## 2025-02-18 PROCEDURE — 85025 COMPLETE CBC W/AUTO DIFF WBC: CPT

## 2025-02-18 PROCEDURE — 80053 COMPREHEN METABOLIC PANEL: CPT

## 2025-02-18 RX ORDER — ATORVASTATIN CALCIUM 20 MG/1
1 TABLET, FILM COATED ORAL DAILY
COMMUNITY

## 2025-03-24 NOTE — PROGRESS NOTES
[facility]     Referring Provider: MARINO Deng     Chief complaint: Routine follow up DCIS     Subjective   HPI:   10/12/2023:  Ms. Becca Bansal is a 62 yo woman, seen at the request of MARINO Deng, for a new diagnosis of right breast atypical ductal hyperplasia.  She had not had a screening mammogram for 10 years, then in July of this year presented for a screening mammogram.  She was called back for multiple right breast findings.  Diagnostic imaging demonstrated 2 separate groups of calcifications and a right breast focal asymmetry without an ultrasound correlate.  She underwent biopsy of one of the groups of calcifications and this showed ADH.  The other group of calcifications and a focal asymmetry were placed in imaging surveillance.  Prior to this appointment, she underwent a breast MRI which showed an area of linear enhancement in the left breast. Her work-up is detailed in the breast history section below. Prior to the biopsy, she denies any breast lumps, pain, skin changes, or nipple discharge. She denies any prior history of abnormal mammograms or breast biopsies. She denies any family history of breast or ovarian cancer.     11/16/2023:  After her last visit, she underwent a right stereotactic biopsy which showed DCIS and a left MRI guided biopsy which showed an intraductal papilloma.  She returns today to discuss the results.       12/29/2023:  She underwent right partial mastectomy with right breast excisional biopsy and left breast excisional biopsy with bilateral oncoplastic reduction on 12/19/23. See surgery & pathology details below in oncologic history.  Unfortunately she developed an itchy rash on both breasts over the past few days.  She has been taking Benadryl to help with the itching.  She denies any fevers.    3/27/2024  She returns today for scheduled follow-up.  She saw Dr. Crocker in [place] who recommended radiation.  She then got a second  opinion and it sounds like she may have had a DCISionRT test performed which suggested she was at low risk for recurrence (but I do not have these records).  Ultimately she decided against radiation. She has not started exemestane yet.  She noticed a nodular area near her right nipple.    8/6/2024   Pt is presenting to The office today for routine follow-up.  On 7/15/2024 she had bilateral screening mammogram that resulted as BI-RADS 0 for left breast calcifications and recommended a left diagnostic mammogram which she had on 7/30/2024 if she has been placed in B3 ending a follow-up left diagnostic mammogram in 6 months.  She last saw her oncologist in June and was started on Femara and is tolerating it better     3/25/2025 interval history  Patient presents today for routine follow-up and exam.  Patient completed left breast diagnostic mammogram resulting in BI-RADS 2.  Will return to bilateral screening mammogram in July.  Last saw oncology on 2/18/2025, continues with letrozole and tolerating it well.  No new breast complaints or concerns today.    Oncology/Hematology History   Ductal carcinoma in situ (DCIS) of right breast   1/12/2023 Imaging    Screening MMG ([facility]):  The current examination reveals no new abnormalities or suspicious changes in appearance.  BI-RADS 2     7/10/2023 Initial Diagnosis    Ductal carcinoma in situ (DCIS) of right breast     7/10/2023 Imaging    Screening MMG with [facility] ( [facility]):   Right breast:  Focal asymmetry in the upper outer right breast middle 3rd depth with associated   grouped calcifications.  More anteriorly in the upper outer right breast is a smaller group of   calcifications.  Left breast:  Development of several course benign type left breast calcifications.  There are   developing vascular and secretory type calcifications visualized.  No architectural distortion or   suspicious mass.  BI-RADS 0: Incomplete     8/14/2023 Imaging    Right  Diagnostic MMG with [facility] & Right Breast US ([facility]):  MMG:  Right diagnostic mammogram:      There are subtle punctate and fine pleomorphic calcifications within the outer aspect of the right   breast which appears new from the prior examination.   There are punctate, round, and coarse heterogeneous calcifications which appear to be along a   linear distribution associated with a vessel.  These correspond to the upper outer aspect of the   right breast.  There is a subtle focal asymmetry within the upper-outer quadrant of the right breast on current   examination which appears fairly similar to the prior more remote examination from 2013 when   accounting for differences in technique.  US:  Sonographic grayscale and color Doppler images of the right breast were obtained with   representative examples submitted to PACs for interpretation.  Imaging at the 11 o'clock position   corresponding to the focal asymmetry within the upper-outer quadrant of the right breast   demonstrates no sonographic correlate.  There are incidental intramammary lymph nodes at the 11   o'clock position 13 cm from the nipple.   IMPRESSION:  1. Subtle punctate and fine pleomorphic calcifications within the outer aspect of the right breast   which appear new from prior examination.  Recommend right breast stereotactic biopsy for definitive   tissue diagnosis.  2. Additional calcifications appear linear in distribution and appear to correlate to a small   vessel.  These are considered probably benign and attention on follow-up right diagnostic mammogram   6 months is recommended.  3. Right breast focal asymmetry appears like the more remote prior examination from 2013 when   accounting for differences in technique.  No sonographic correlate was identified.  This could also   be re-evaluated on the follow-up right diagnostic mammogram in 6 months.    BI-RADS 4: Suspicious     8/21/2023 Biopsy    Right Breast, Stereotactic Biopsy ([facility]):  Right  breast, outer, stereotactic-guided core biopsy:               - Atypical ductal hyperplasia (ADH) involving an intraductal papilloma  - Usual ductal hyperplasia (UDH)  - Columnar cell change  - Microcalcifications   Comment:  The above diagnosis was rendered in expert consultation by Mery Sarah MD , [facility] .  -Hydrocoil clip.     10/7/2023 Imaging    Bilateral Breast MRI ( [facility]):  In the middle third of the lateral aspect of the right breast at the 9 o'clock position centered on the order of 6.5 cm from the nipple there is a focal signal void seen at the inferior margin of non-mass enhancement that measures on the order of 1.4 cm in the anterior to posterior dimension, 0.8 cm in the superior to inferior dimension and 0.7 cm in the medial to lateral dimension. This corresponds to the biopsy-proven site of ADH with the coil-shaped metallic clip.   No other areas of suspicious enhancement or morphology are seen in the right breast. I see no evidence for abnormal right breast skin, nipple or chest wall enhancement and there is no evidence for right axillary or internal mammary chain adenopathy.  In the anterior one-third of the lateral aspect of the left breast centered at the 3:30 position on the order of 4 cm from the nipple there is linear enhancement that measures on the order of 1.5 cm in the anterior to posterior dimension, 0.6 cm in the superior to inferior dimension and 0.7 cm in the medial to lateral dimension. No definite mammographic correlate is appreciated.   No areas of abnormal enhancement or morphology are otherwise seen in the left breast. I see no evidence for abnormal left breast skin, nipple or chest wall enhancement and there is no evidence for left axillary or internal mammary chain adenopathy.  BIRADS 4: Suspicious.      10/27/2023 Imaging    Additional MRI Images ( [facility]):  Additional images were taken of the left breast pre and post intravenous administration of gadolinium  to evaluate an area of linear enhancement and determine if the area showed features of a vessel.   Following additional acquisition of the images the area of linear enhancement does not show evidence of being a vessel. It is not reliably attached to a vessel or branching structure. Therefore, continued biopsy  of the area of linear enhancement in the left breast at the 3:30 position is recommended.  BI-RADS Category 4: Suspicious.     11/14/2023 Biopsy    Left Breast MR-Guided Biopsy & Right Breast Stereotactic Biopsy ([facility]):    1.  Left breast, 3:00, MRI-guided biopsies for linear enhancement:               -Intraductal papilloma, 2 mm in greatest extent, present as fragments in 2 of 19 total cores.    -No atypical hyperplasia, in situ nor invasive carcinoma identified.  -Stoplight clip.     2.  Right breast, 11:00, stereotactic biopsies for calcifications: INTERMEDIATE GRADE DUCTAL CARCINOMA IN SITU (DCIS).               -Architectural patterns: Solid and cribriform with central necrosis and associated microcalcifications.               -DCIS is present in a single tissue core measuring up to 7 mm maximally.               -Additional findings include fibroadenomatoid hyperplasia.               -No evidence of in situ carcinoma identified.  -Bowtie clip.     ER+ (%, strong)  OH+ (%, strong)  Ki-67 12%     12/19/2023 Surgery    Left breast needle-localized excisional biopsy, right breast needle-localized excisional biopsy, and right needle-localized partial mastectomy with bilateral oncoplastic reduction    1. Left Breast, Needle Localized Excisional Biopsy (18 G):               A. Breast parenchyma with micropapilloma and usual ductal hyperplasia.               B. Clip and biopsy site changes are present.   C. Unremarkable skin.                 2. Right Breast, 10:00, Needle Localized Excisional Biopsy (20 G):               A. Breast parenchyma with focal atypical ductal hyperplasia.               B.  Clip and biopsy site changes are present.               C. Unremarkable skin.     3. Right Breast, 10:00, Additional Superior and Medial Margins, Reexcision:               A. Breast parenchyma with no significant histopathologic changes       (additional 11 mm of benign tissue).     4. Right Breast, 10:00, Additional Posterior Margin, Reexcision:               A. Breast parenchyma with usual ductal hyperplasia involving an intraductal papilloma       (additional 8 mm of benign tissue).     5. Right Breast, 10:00, Additional Lateral and Inferior Margins, Reexcision:               A. Breast parenchyma with no significant histopathologic changes       (additional 10 mm of benign tissue).     6. Right Breast, 12:00, Needle Localized Partial Mastectomy (37 G):               A. INTERMEDIATE TO HIGH-GRADE DUCTAL CARCINOMA IN SITU (DCIS):  1. Solid and cribriform types with associated necrosis and calcifications.  2. Extent of DCIS: 10 mm (based on slices involved).  3. Margins are negative for in situ carcinoma; closest distance: DCIS is present 4 mm from the       inferior margin (superseded by specimen #7).  B. Clip and biopsy site changes are present adjacent to DCIS.  C. See synoptic report.     7. Right Breast, 12:00, Additional Inferior and Lateral Margins, Reexcision:               A. Breast parenchyma with no significant histopathologic changes       (additional 8 mm of tissue free of carcinoma).     8. Right Breast, 12:00, Additional Anterior, Superior, and Medial Margins, Reexcision:               A. Breast parenchyma with no significant histopathologic changes       (additional 8 mm of tissue free of carcinoma).     9. Left Breast Tissue, Reduction Mammoplasty (536 G):               A. Benign skin and breast parenchyma with no significant histopathologic changes.     10. Right Breast Tissue, Reduction Mammoplasty (613 G):   A. Benign skin and breast parenchyma with no significant histopathologic changes.      7/15/2024 Imaging    Bilateral screening mammogram at [facility].  There are scattered areas of fibroglandular density.     New post-surgical changes in both breasts.     Loosely grouped indeterminate calcifications in the lower outer left  breast, middle to posterior depth, which are likely due to postoperative  change.     There are no suspicious masses or architectural distortion in the right  breast.     IMPRESSION:  Indeterminate left breast calcifications, which are likely due to  postoperative change. Recommend further evaluation with left diagnostic  mammogram.     BI-RADS ASSESSMENT: BI-RADS 0     7/30/2024 Imaging    Left diagnostic mammogram at [facility]  There are scattered areas of fibroglandular density.     Loosely grouped calcifications in the lower outer left breast, middle to  posterior depth, are associated with fat density, likely representing  developing dystrophic calcifications related to postoperative change/fat  necrosis. These are considered probably benign.     IMPRESSION:  Recommend short-term follow-up left diagnostic mammogram in 6 months to  ensure benign evolution or stability of probably benign left breast  calcifications, likely related to postoperative change. I discussed  results with the patient following the exam.     BI-RADS ASSESSMENT: BI-RADS 3.     1/21/2025 Imaging    Left diagnostic mammogram at [facility]  There are postoperative changes from prior reduction mammoplasty. There  are stable loosely grouped calcifications inferiorly in the left breast  consistent with fat necrosis. No suspicious masses or areas of  architectural distortion are identified.  These mammographic images were interpreted with the assistance of a  computer aided detection system.     The patient's information is entered into a computerized reminder system  with a targeted due date for the next mammogram.  IMPRESSION:  Benign left mammogram. The patient is due for a bilateral screening  examination in July  2025  TISSUE DENSITY: Scattered  BI-RADS ASSESSMENT: BI-RADS 2. Benign findings.         Review of Systems:  See interval history.      Medications:    Current Outpatient Medications:     aspirin 81 MG oral suspension, , Disp: , Rfl:     atorvastatin (LIPITOR) 20 MG tablet, Take 1 tablet by mouth Daily., Disp: , Rfl:     azelastine (ASTELIN) 0.1 % nasal spray, 1 spray As Needed., Disp: , Rfl:     Cannabidiol 100 MG/ML solution, Take 1 mg by mouth Daily., Disp: , Rfl:     CINNAMON PO, Take 1 tablet by mouth Daily. HOLD ONE WEEK FOR SURGERY, Disp: , Rfl:     dapagliflozin Propanediol (Farxiga) 10 MG tablet, Take 10 mg by mouth Daily., Disp: , Rfl:     letrozole (FEMARA) 2.5 MG tablet, TAKE 1 TABLET DAILY, Disp: 90 tablet, Rfl: 3    lisinopril-hydrochlorothiazide (PRINZIDE,ZESTORETIC) 20-25 MG per tablet, Take 1 tablet by mouth Daily., Disp: , Rfl:     loratadine (CLARITIN) 10 MG tablet, Take 1 tablet by mouth Daily., Disp: , Rfl:     montelukast (SINGULAIR) 10 MG tablet, Take 1 tablet by mouth Every Night., Disp: , Rfl:     Multiple Vitamins-Minerals (Multi Adult Gummies) chewable tablet, Chew 1 tablet Daily. HOLD FOR ONE WEEK FOR SURGERY, Disp: , Rfl:     omeprazole (priLOSEC) 20 MG capsule, 1 capsule every night at bedtime., Disp: , Rfl:     Allergies:  No Known Allergies    Family History   Problem Relation Age of Onset    Hypertension Mother     Irritable bowel syndrome Mother     Aneurysm Mother     Hypertension Father     Heart disease Father     Diabetes Father     No Known Problems Sister     Hypertension Brother     Cancer Brother     Diabetes Brother     Esophageal cancer Brother     No Known Problems Brother     No Known Problems Brother     No Known Problems Brother     No Known Problems Brother     Aneurysm Maternal Aunt     Aneurysm Maternal Uncle     Cancer Maternal Uncle     Cancer Maternal Grandmother     Malig Hyperthermia Neg Hx        Objective   PHYSICAL EXAMINATION:   There were no vitals filed  for this visit.  ECOG 0 - Asymptomatic  General: NAD, well appearing  Psych: a&o x3, normal mood and affect  Eyes: EOMI, no scleral icterus  ENMT: neck supple without masses or thyromegaly, mucous membranes moist  MSK: normal gait, normal ROM in bilateral shoulders  Lymph nodes: no cervical, supraclavicular or axillary lymphadenopathy  Breast:   Right: Sp mastopexy with well-healed scars.  There is some periareolar nodularity, likely fat necrosis.  No nipple abnormalities.  Left: Sp mastopexy with well-healed scars.  No masses or nipple abnormalities.      Assessment & Plan   Assessment:   64 y.o. F with a diagnosis of right breast ductal carcinoma in situ (DCIS), intermediate-high grade, ER/TX positive. She also had a diagnosis of right breast atypical ductal hyperplasia (ADH) and a left breast intraductal papilloma. She underwent right partial mastectomy with right breast excisional biopsy and left breast excisional biopsy with bilateral oncoplastic reduction on 12/19/23 with benign final pathology on the left and pTis on the right.  She declined adjuvant radiation.    Plan:  -Bilateral screening mammogram in July   - exam in Oct    -Reassured her that the nodular area near her right nipple is likely evolving fat necrosis.  -Continue follow-up with Dr. Marte.      MARINO Cardenas      CC:  MARINO Wilson

## 2025-03-25 ENCOUNTER — OFFICE VISIT (OUTPATIENT)
Dept: SURGERY | Facility: CLINIC | Age: 65
End: 2025-03-25
Payer: MEDICARE

## 2025-03-25 VITALS
DIASTOLIC BLOOD PRESSURE: 88 MMHG | SYSTOLIC BLOOD PRESSURE: 154 MMHG | BODY MASS INDEX: 39.69 KG/M2 | HEART RATE: 116 BPM | OXYGEN SATURATION: 95 % | HEIGHT: 63 IN | WEIGHT: 224 LBS

## 2025-03-25 DIAGNOSIS — Z12.31 ENCOUNTER FOR SCREENING MAMMOGRAM FOR MALIGNANT NEOPLASM OF BREAST: ICD-10-CM

## 2025-03-25 DIAGNOSIS — D05.11 DUCTAL CARCINOMA IN SITU (DCIS) OF RIGHT BREAST: Primary | ICD-10-CM

## 2025-03-25 PROCEDURE — 1159F MED LIST DOCD IN RCRD: CPT | Performed by: NURSE PRACTITIONER

## 2025-03-25 PROCEDURE — 1160F RVW MEDS BY RX/DR IN RCRD: CPT | Performed by: NURSE PRACTITIONER

## 2025-03-25 PROCEDURE — 99213 OFFICE O/P EST LOW 20 MIN: CPT | Performed by: NURSE PRACTITIONER

## 2025-03-25 PROCEDURE — G2211 COMPLEX E/M VISIT ADD ON: HCPCS | Performed by: NURSE PRACTITIONER

## 2025-08-06 ENCOUNTER — HOSPITAL ENCOUNTER (OUTPATIENT)
Dept: MAMMOGRAPHY | Facility: HOSPITAL | Age: 65
Discharge: HOME | End: 2025-08-06
Admitting: NURSE PRACTITIONER
Payer: MEDICARE

## 2025-08-06 DIAGNOSIS — Z12.31 ENCOUNTER FOR SCREENING MAMMOGRAM FOR MALIGNANT NEOPLASM OF BREAST: ICD-10-CM

## 2025-08-06 PROCEDURE — 77067 SCR MAMMO BI INCL CAD: CPT

## 2025-08-06 PROCEDURE — 77063 BREAST TOMOSYNTHESIS BI: CPT

## 2025-08-07 ENCOUNTER — TELEPHONE (OUTPATIENT)
Dept: SURGERY | Facility: CLINIC | Age: 65
End: 2025-08-07
Payer: MEDICARE

## 2025-08-07 DIAGNOSIS — R92.8 ABNORMAL FINDINGS ON DIAGNOSTIC IMAGING OF BREAST: Primary | ICD-10-CM

## 2025-08-18 ENCOUNTER — HOSPITAL ENCOUNTER (OUTPATIENT)
Dept: ULTRASOUND IMAGING | Facility: HOSPITAL | Age: 65
Discharge: HOME | End: 2025-08-18
Payer: MEDICARE

## 2025-08-18 ENCOUNTER — HOSPITAL ENCOUNTER (OUTPATIENT)
Dept: MAMMOGRAPHY | Facility: HOSPITAL | Age: 65
Discharge: HOME | End: 2025-08-18
Payer: MEDICARE

## 2025-08-18 PROCEDURE — G0279 TOMOSYNTHESIS, MAMMO: HCPCS

## 2025-08-18 PROCEDURE — 77065 DX MAMMO INCL CAD UNI: CPT

## 2025-08-19 ENCOUNTER — LAB (OUTPATIENT)
Dept: LAB | Facility: HOSPITAL | Age: 65
End: 2025-08-19
Payer: MEDICARE

## 2025-08-19 ENCOUNTER — OFFICE VISIT (OUTPATIENT)
Dept: ONCOLOGY | Facility: CLINIC | Age: 65
End: 2025-08-19
Payer: MEDICARE

## 2025-08-19 ENCOUNTER — APPOINTMENT (OUTPATIENT)
Dept: LAB | Facility: HOSPITAL | Age: 65
End: 2025-08-19
Payer: MEDICARE

## 2025-08-19 VITALS
TEMPERATURE: 98.7 F | HEART RATE: 93 BPM | SYSTOLIC BLOOD PRESSURE: 129 MMHG | DIASTOLIC BLOOD PRESSURE: 83 MMHG | BODY MASS INDEX: 38.93 KG/M2 | OXYGEN SATURATION: 97 % | HEIGHT: 63 IN | WEIGHT: 219.7 LBS

## 2025-08-19 DIAGNOSIS — Z79.899 HIGH RISK MEDICATION USE: ICD-10-CM

## 2025-08-19 DIAGNOSIS — N60.99 ATYPICAL DUCTAL HYPERPLASIA OF BREAST: ICD-10-CM

## 2025-08-19 DIAGNOSIS — D05.11 DUCTAL CARCINOMA IN SITU (DCIS) OF RIGHT BREAST: ICD-10-CM

## 2025-08-19 DIAGNOSIS — D05.11 DUCTAL CARCINOMA IN SITU (DCIS) OF RIGHT BREAST: Primary | ICD-10-CM

## 2025-08-19 LAB
ALBUMIN SERPL-MCNC: 4.2 G/DL (ref 3.5–5.2)
ALBUMIN/GLOB SERPL: 1.4 G/DL
ALP SERPL-CCNC: 122 U/L (ref 39–117)
ALT SERPL W P-5'-P-CCNC: 16 U/L (ref 1–33)
ANION GAP SERPL CALCULATED.3IONS-SCNC: 11.4 MMOL/L (ref 5–15)
AST SERPL-CCNC: 19 U/L (ref 1–32)
BASOPHILS # BLD AUTO: 0.04 10*3/MM3 (ref 0–0.2)
BASOPHILS NFR BLD AUTO: 0.5 % (ref 0–1.5)
BILIRUB SERPL-MCNC: 0.3 MG/DL (ref 0–1.2)
BUN SERPL-MCNC: 17.4 MG/DL (ref 8–23)
BUN/CREAT SERPL: 18.7 (ref 7–25)
CALCIUM SPEC-SCNC: 10.2 MG/DL (ref 8.6–10.5)
CHLORIDE SERPL-SCNC: 102 MMOL/L (ref 98–107)
CO2 SERPL-SCNC: 26.6 MMOL/L (ref 22–29)
CREAT SERPL-MCNC: 0.93 MG/DL (ref 0.57–1)
DEPRECATED RDW RBC AUTO: 45.8 FL (ref 37–54)
EGFRCR SERPLBLD CKD-EPI 2021: 67.9 ML/MIN/1.73
EOSINOPHIL # BLD AUTO: 0.17 10*3/MM3 (ref 0–0.4)
EOSINOPHIL NFR BLD AUTO: 2.2 % (ref 0.3–6.2)
ERYTHROCYTE [DISTWIDTH] IN BLOOD BY AUTOMATED COUNT: 15.2 % (ref 12.3–15.4)
GLOBULIN UR ELPH-MCNC: 3.1 GM/DL
GLUCOSE SERPL-MCNC: 183 MG/DL (ref 65–99)
HCT VFR BLD AUTO: 41.5 % (ref 34–46.6)
HGB BLD-MCNC: 12.8 G/DL (ref 12–15.9)
IMM GRANULOCYTES # BLD AUTO: 0.04 10*3/MM3 (ref 0–0.05)
IMM GRANULOCYTES NFR BLD AUTO: 0.5 % (ref 0–0.5)
LYMPHOCYTES # BLD AUTO: 1.63 10*3/MM3 (ref 0.7–3.1)
LYMPHOCYTES NFR BLD AUTO: 20.8 % (ref 19.6–45.3)
MCH RBC QN AUTO: 25.7 PG (ref 26.6–33)
MCHC RBC AUTO-ENTMCNC: 30.8 G/DL (ref 31.5–35.7)
MCV RBC AUTO: 83.3 FL (ref 79–97)
MONOCYTES # BLD AUTO: 0.55 10*3/MM3 (ref 0.1–0.9)
MONOCYTES NFR BLD AUTO: 7 % (ref 5–12)
NEUTROPHILS NFR BLD AUTO: 5.39 10*3/MM3 (ref 1.7–7)
NEUTROPHILS NFR BLD AUTO: 69 % (ref 42.7–76)
NRBC BLD AUTO-RTO: 0 /100 WBC (ref 0–0.2)
PLATELET # BLD AUTO: 339 10*3/MM3 (ref 140–450)
PMV BLD AUTO: 9.5 FL (ref 6–12)
POTASSIUM SERPL-SCNC: 4.2 MMOL/L (ref 3.5–5.2)
PROT SERPL-MCNC: 7.3 G/DL (ref 6–8.5)
RBC # BLD AUTO: 4.98 10*6/MM3 (ref 3.77–5.28)
SODIUM SERPL-SCNC: 140 MMOL/L (ref 136–145)
WBC NRBC COR # BLD AUTO: 7.82 10*3/MM3 (ref 3.4–10.8)

## 2025-08-19 PROCEDURE — 80053 COMPREHEN METABOLIC PANEL: CPT

## 2025-08-19 PROCEDURE — 99214 OFFICE O/P EST MOD 30 MIN: CPT | Performed by: STUDENT IN AN ORGANIZED HEALTH CARE EDUCATION/TRAINING PROGRAM

## 2025-08-19 PROCEDURE — 1125F AMNT PAIN NOTED PAIN PRSNT: CPT | Performed by: STUDENT IN AN ORGANIZED HEALTH CARE EDUCATION/TRAINING PROGRAM

## 2025-08-19 PROCEDURE — 36415 COLL VENOUS BLD VENIPUNCTURE: CPT

## 2025-08-19 PROCEDURE — 85025 COMPLETE CBC W/AUTO DIFF WBC: CPT

## 2025-08-19 RX ORDER — ANASTROZOLE 1 MG/1
1 TABLET ORAL DAILY
Qty: 90 TABLET | Refills: 3 | Status: SHIPPED | OUTPATIENT
Start: 2025-09-19 | End: 2025-12-18

## (undated) DEVICE — SMOKE EVACUATION TUBING WITH 7/8 IN TO 1/4 IN REDUCER: Brand: BUFFALO FILTER

## (undated) DEVICE — SUT MNCRYL 3/0 PS2 18IN MCP497G

## (undated) DEVICE — BLCK/BITE BLOX WO/DENTL/RIM W/STRAP 54F

## (undated) DEVICE — SUT MNCRYL PLS ANTIB UD 4/0 PS2 18IN

## (undated) DEVICE — PK UNIV COMPL 40

## (undated) DEVICE — GLV SURG SENSICARE POLYISPRN W/ALOE PF LF 6.5 GRN STRL

## (undated) DEVICE — STPLR SKIN VISISTAT WD 35CT

## (undated) DEVICE — BANDAGE,GAUZE,BULKEE II,4.5"X4.1YD,STRL: Brand: MEDLINE

## (undated) DEVICE — ADHS SKIN SURG TISS VISC PREMIERPRO EXOFIN HI/VISC FAST/DRY

## (undated) DEVICE — APPL CHLORAPREP HI/LITE 26ML ORNG

## (undated) DEVICE — SUT ETHLN 3/0 PS1 18IN 1663H

## (undated) DEVICE — Device

## (undated) DEVICE — ESOPHAGEAL BALLOON DILATATION CATHETER: Brand: CRE FIXED WIRE

## (undated) DEVICE — DEV INFL CRE STERIFLATE 60CC DISP

## (undated) DEVICE — TRAP FLD MINIVAC MEGADYNE 100ML

## (undated) DEVICE — ANTIBACTERIAL UNDYED BRAIDED (POLYGLACTIN 910), SYNTHETIC ABSORBABLE SUTURE: Brand: COATED VICRYL

## (undated) DEVICE — GLV SURG SENSICARE PI MIC PF SZ6.5 LF STRL

## (undated) DEVICE — JACKSON-PRATT 100CC BULB RESERVOIR: Brand: CARDINAL HEALTH

## (undated) DEVICE — NDL HYPO ECLPS SFTY 22G 1 1/2IN

## (undated) DEVICE — DRSNG SURESITE WNDW 4X4.5

## (undated) DEVICE — SYR LL TP 10ML STRL

## (undated) DEVICE — SOL IRRG H2O PL/BG 1000ML STRL

## (undated) DEVICE — LEGGINGS, PAIR, 31X48, STERILE: Brand: MEDLINE

## (undated) DEVICE — GLV SURG BIOGEL LTX PF 8 1/2

## (undated) DEVICE — ELECTRD BLD EZ CLN MOD 2.5IN

## (undated) DEVICE — TBG PENCL TELESCP MEGADYNE SMOKE EVAC 10FT

## (undated) DEVICE — SOLIDIFIER LIQLOC PLS 1500CC BT

## (undated) DEVICE — LINER SURG CANSTR SXN S/RIGD 1500CC

## (undated) DEVICE — CONN JET HYDRA H20 AUXILIARY DISP

## (undated) DEVICE — SINGLE-USE BIOPSY FORCEPS: Brand: RADIAL JAW 4

## (undated) DEVICE — CONN TBG Y 5 IN 1 LF STRL

## (undated) DEVICE — SPNG LAP 18X18IN LF STRL PK/5

## (undated) DEVICE — KT INK TISS MARGINMARKER STD 6COLOR

## (undated) DEVICE — INTENDED FOR TISSUE SEPARATION, AND OTHER PROCEDURES THAT REQUIRE A SHARP SURGICAL BLADE TO PUNCTURE OR CUT.: Brand: BARD-PARKER ® STAINLESS STEEL BLADES

## (undated) DEVICE — Device: Brand: FABCO

## (undated) DEVICE — PATIENT RETURN ELECTRODE, SINGLE-USE, CONTACT QUALITY MONITORING, ADULT, WITH 9FT CORD, FOR PATIENTS WEIGING OVER 33LBS. (15KG): Brand: MEGADYNE

## (undated) DEVICE — NEEDLE, QUINCKE 22GX3.5": Brand: MEDLINE INDUSTRIES, INC.

## (undated) DEVICE — LOU MINOR PROCEDURE: Brand: MEDLINE INDUSTRIES, INC.

## (undated) DEVICE — STPLR SKIN SUBCUTICULAR INSORB 2030

## (undated) DEVICE — Device: Brand: DEFENDO AIR/WATER/SUCTION AND BIOPSY VALVE

## (undated) DEVICE — BNDG ELAS ELITE V/CLOSE 6IN 5YD LF STRL

## (undated) DEVICE — MARKR SKIN W/RULR AND LBL

## (undated) DEVICE — GOWN,SIRUS,NON REINFRCD,LARGE,SET IN SL: Brand: MEDLINE

## (undated) DEVICE — ELECTRD BLD EZ CLN MOD XLNG 2.75IN

## (undated) DEVICE — 3M™ IOBAN™ 2 ANTIMICROBIAL INCISE DRAPE 6640EZ: Brand: IOBAN™ 2